# Patient Record
Sex: MALE | Race: WHITE | Employment: OTHER | URBAN - METROPOLITAN AREA
[De-identification: names, ages, dates, MRNs, and addresses within clinical notes are randomized per-mention and may not be internally consistent; named-entity substitution may affect disease eponyms.]

---

## 2017-02-14 RX ORDER — NEBIVOLOL HYDROCHLORIDE 10 MG/1
TABLET ORAL
Qty: 60 TAB | Refills: 6 | Status: SHIPPED | OUTPATIENT
Start: 2017-02-14 | End: 2017-06-15 | Stop reason: ALTCHOICE

## 2017-05-19 ENCOUNTER — OFFICE VISIT (OUTPATIENT)
Dept: ENDOCRINOLOGY | Age: 62
End: 2017-05-19

## 2017-05-19 VITALS
HEART RATE: 81 BPM | SYSTOLIC BLOOD PRESSURE: 133 MMHG | DIASTOLIC BLOOD PRESSURE: 81 MMHG | WEIGHT: 210.6 LBS | BODY MASS INDEX: 31.19 KG/M2 | HEIGHT: 69 IN

## 2017-05-19 DIAGNOSIS — I10 BENIGN ESSENTIAL HYPERTENSION: ICD-10-CM

## 2017-05-19 DIAGNOSIS — E78.2 MIXED HYPERLIPIDEMIA: ICD-10-CM

## 2017-05-19 DIAGNOSIS — Z79.4 TYPE 2 DIABETES MELLITUS WITH OTHER CIRCULATORY COMPLICATION, WITH LONG-TERM CURRENT USE OF INSULIN (HCC): Primary | ICD-10-CM

## 2017-05-19 DIAGNOSIS — E11.59 TYPE 2 DIABETES MELLITUS WITH OTHER CIRCULATORY COMPLICATION, WITH LONG-TERM CURRENT USE OF INSULIN (HCC): Primary | ICD-10-CM

## 2017-05-19 LAB — HBA1C MFR BLD HPLC: 7.7 %

## 2017-05-19 RX ORDER — CLOTRIMAZOLE AND BETAMETHASONE DIPROPIONATE 10; .5 MG/ML; MG/ML
LOTION TOPICAL
Qty: 30 ML | Refills: 1 | Status: SHIPPED | OUTPATIENT
Start: 2017-05-19 | End: 2018-11-28 | Stop reason: SDUPTHER

## 2017-05-19 RX ORDER — PETROLATUM,WHITE
OINTMENT IN PACKET (GRAM) TOPICAL
Qty: 2270 G | Refills: 3 | Status: SHIPPED | OUTPATIENT
Start: 2017-05-19 | End: 2020-11-06 | Stop reason: ALTCHOICE

## 2017-05-19 NOTE — MR AVS SNAPSHOT
Visit Information Date & Time Provider Department Dept. Phone Encounter #  
 5/19/2017 11:30 AM Allison Torres, [address] [place] [address] Diabetes and Endocrinology [phone] [id number] Follow-up Instructions Return in about 6 months (around 11/19/2017). Your Appointments 6/15/2017  2:30 PM  
ESTABLISHED PATIENT with Trina Toledo MD  
[facility]) Appt Note: 6mo f/u REM $0CP  
 [address] [facility]  
[phone] 2 [address] Upcoming Health Maintenance Date Due Hepatitis C Screening 1955 DTaP/Tdap/Td series (1 - Tdap) 7/23/1975 FOBT Q 1 YEAR AGE 50-75 7/23/2004 EYE EXAM RETINAL OR DILATED Q1 3/15/2011 ZOSTER VACCINE AGE 60> 7/23/2014 LIPID PANEL Q1 10/30/2015 MICROALBUMIN Q1 5/12/2017 HEMOGLOBIN A1C Q6M 5/18/2017 INFLUENZA AGE 9 TO ADULT 8/1/2017 FOOT EXAM Q1 11/18/2017 Allergies as of 5/19/2017  Review Complete On: 5/19/2017 By: Allison Torres MD  
 No Known Allergies Current Immunizations  Reviewed on 5/29/2012 Name Date Influenza Vaccine Split  Deferred (Patient Refused), 3/27/2012  5:26 PM  
 Pneumococcal Vaccine (Unspecified Type) 3/25/2012  4:13 PM  
  
 Not reviewed this visit You Were Diagnosed With   
  
 Codes Comments Type 2 diabetes mellitus with other circulatory complication, with long-term current use of insulin (HCC)    -  Primary ICD-10-CM: E11.59, Z79.4 ICD-9-CM: 250.70, V58.67 Benign essential hypertension     ICD-10-CM: I10 
ICD-9-CM: 401.1 Mixed hyperlipidemia     ICD-10-CM: E78.2 ICD-9-CM: 272.2 Vitals BP Pulse Height(growth percentile) Weight(growth percentile) BMI Smoking Status 133/81 81 5' 9\" (1.753 m) 210 lb 9.6 oz (95.5 kg) 31.1 kg/m2 Current Every Day Smoker BMI and BSA Data  Body Mass Index Body Surface Area  
 31.1 kg/m 2 2.16 m 2  
  
  
 Preferred Pharmacy Pharmacy Name Phone [facility] [address], [address] [facility] [address] Your Updated Medication List  
  
   
This list is accurate as of: 5/19/17 12:10 PM.  Always use your most recent med list. amLODIPine 10 mg tablet Commonly known as:  Mary Medici TAKE ONE TABLET BY MOUTH ONCE DAILY  
  
 aspirin 81 mg chewable tablet Take 81 mg by mouth daily. atorvastatin 40 mg tablet Commonly known as:  LIPITOR  
TAKE ONE TABLET BY MOUTH ONCE DAILY  
  
 BENADRYL ALLERGY 25 mg tablet Generic drug:  diphenhydrAMINE Take 25 mg by mouth every six (6) hours as needed for Sleep. chlorthalidone 25 mg tablet Commonly known as:  HYGROTEN  
TAKE ONE TABLET BY MOUTH ONCE DAILY  
  
 clotrimazole 1 % topical cream  
Commonly known as:  Jim Sauceda Apply  to affected area two (2) times a day. glipiZIDE 10 mg tablet Commonly known as:  GLUCOTROL  
1 tablet with breakfast and 1 1/2 tablet with dinner (new directions) glycerin-mineral oil-lanolin topical cream  
Commonly known as:  EUCERIN PLUS Apply twice daily as needed JANUMET 50-1,000 mg per tablet Generic drug:  SITagliptin-metFORMIN  
TAKE 1 TABLET BY MOUTH WITH BREAKFAST AND 1 TABLET BY MOUTH WITH DINNER. THIS REPLACES METFORMIN AND JANUVIA  
  
 KLOR-CON M20 20 mEq tablet Generic drug:  potassium chloride TAKE ONE TABLET BY MOUTH ONCE DAILY  
  
 LANTUS SOLOSTAR 100 unit/mL (3 mL) pen Generic drug:  insulin glargine INJECT 9 UNITS SUBCUTANEOUSLY ONCE DAILY  
  
 lisinopril 40 mg tablet Commonly known as:  PRINIVIL, ZESTRIL  
TAKE ONE TABLET BY MOUTH ONCE DAILY FOR BLOOD PRESSURE AND KIDNEY PROTECTION  
  
 * nebivolol 20 mg tablet Commonly known as:  BYSTOLIC Take 1 tablet by mouth daily. * BYSTOLIC 10 mg tablet Generic drug:  nebivolol TAKE TWO TABLETS BY MOUTH ONCE DAILY  
  
 nitroglycerin 0.4 mg SL tablet Commonly known as:  NITROSTAT  
by SubLINGual route every five (5) minutes as needed. PEPCID 20 mg tablet Generic drug:  famotidine Take 20 mg by mouth daily. sildenafil citrate 50 mg tablet Commonly known as:  VIAGRA Take 1 Tab by mouth as needed for Other (ED. ). Do not take nitroglycerine tablets. TYLENOL 325 mg tablet Generic drug:  acetaminophen Take  by mouth every four (4) hours as needed for Pain. VITAMIN D3 2,000 unit Tab Generic drug:  cholecalciferol (vitamin D3) Take  by mouth. * Notice: This list has 2 medication(s) that are the same as other medications prescribed for you. Read the directions carefully, and ask your doctor or other care provider to review them with you. We Performed the Following AMB POC HEMOGLOBIN A1C [23971 CPT(R)]  DIABETES FOOT EXAM [HM7 Custom] MICROALBUMIN, UR, RAND W/ MICROALBUMIN/CREA RATIO [id number] CPT(R)] MN COLLECTION VENOUS BLOOD,VENIPUNCTURE [id number] CPT(R)] MN HANDLG&/OR CONVEY OF SPEC FOR TR OFFICE TO LAB [44941 CPT(R)] Follow-up Instructions Return in about 6 months (around 11/19/2017). Patient Instructions 1) Continue the Lantus 10 units every day (insulin) 2) Continue the Glipizide one pill with breakfast and one and a half with dinner. 3) Continue the Janumet 50-1000mg with breakfast and with dinner. Introducing [facility] & HEALTH SERVICES! [facility] introduces [employer] patient portal. Now you can access parts of your medical record, email your doctor's office, and request medication refills online. 1. In your internet browser, go to [email or web address] 2. Click on the First Time User? Click Here link in the Sign In box. You will see the New Member Sign Up page. 3. Enter your [employer] Access Code exactly as it appears below. You will not need to use this code after youve completed the sign-up process.  If you do not sign up before the expiration date, you must request a new code. · [employer] Access Code: [email or web address] Expires: 8/17/2017 12:10 PM 
 
4. Enter the last four digits of your Social Security Number (xxxx) and Date of Birth (mm/dd/yyyy) as indicated and click Submit. You will be taken to the next sign-up page. 5. Create a SmartCloud ID. This will be your SmartCloud login ID and cannot be changed, so think of one that is secure and easy to remember. 6. Create a SmartCloud password. You can change your password at any time. 7. Enter your Password Reset Question and Answer. This can be used at a later time if you forget your password. 8. Enter your e-mail address. You will receive e-mail notification when new information is available in [address] 9. Click Sign Up. You can now view and download portions of your medical record. 10. Click the Download Summary menu link to download a portable copy of your medical information. If you have questions, please visit the Frequently Asked Questions section of the Smart[employer] website. Remember, [employer] is NOT to be used for urgent needs. For medical emergencies, dial 911. Now available from your iPhone and Android! Please provide this summary of care documentation to your next provider. If you have any questions after today's visit, please call [phone].

## 2017-05-19 NOTE — PROGRESS NOTES
Chief Complaint   Patient presents with    Diabetes     pcp and pharmacy verified. Eye exam over a year. Records since his last visit reviewed. History of Present Illness: Ricardo Funk is a 57 y.o. male here for follow up of diabetes. At his last visit in November 2016 his A1C was 9.0% on Janumet 50/1000mg BID, glipizide 10mg BID and Lantus 9 units daily. I instructed him to increase his Lantus to 11 units and his Glipizide to 10mg with breakfast and 15mg with dinner and continue his Janumet 50/1000mg BID. Pt notes he had some low BGs so he went back to the Lantus 9 units daily. He saw Dr. Silvia Fuller in December 2016 and everything was looking stable. Today is the last day of an Antibiotic regimen he was taking for a tooth infection. His A1C today was 7.7%    Pt is taking Janumet 50/1000mg BID, Lantus 10 units daily and Glipizide 10mg with breakfast and 15mg with dinner. He notes that with the Lantus 11 units he had low BGs, but with the 10 units he has not had any low BGs. He denies issues of CP, SOB, polyuria and polydipsia. Pt is eating 3 meals daily. His largest meal is Dinner. For breakfast he will have a poptart or Sauceda and egg sandwich. He will have lunch around 1PM, he typically has a turkey sandwich and coffee   Last night for dinner he had sausage gravy and eggs and peas and water. He is followed by Dr. Silvia Fuller of cardiology. He stays busy chopping wood and yard work and work around [employer]. 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 No history of retinopathy, neuropathy, but has some nephropathy (Urine MA/Cr was 70 in May 2016). Last eye exam was May 2015, he has an appointment in the near future. Current Outpatient Prescriptions   Medication Sig    white petrolatum (WHITE PETROLEUM JELLY) topical onitment Apply to the affected area every morning. To uses once the rash is gone.     clotrimazole-betamethasone (LOTRISONE) 1-0.05 % lotion Apply to the effected area every morning    lisinopril (PRINIVIL, ZESTRIL) 40 mg tablet TAKE ONE TABLET BY MOUTH ONCE DAILY FOR BLOOD PRESSURE AND KIDNEY PROTECTION    LANTUS SOLOSTAR 100 unit/mL (3 mL) pen INJECT 9 UNITS SUBCUTANEOUSLY ONCE DAILY (Patient taking differently: INJECT 10 UNITS SUBCUTANEOUSLY ONCE DAILY)    BYSTOLIC 10 mg tablet TAKE TWO TABLETS BY MOUTH ONCE DAILY    chlorthalidone (HYGROTEN) 25 mg tablet TAKE ONE TABLET BY MOUTH ONCE DAILY    KLOR-CON M20 20 mEq tablet TAKE ONE TABLET BY MOUTH ONCE DAILY    amLODIPine (NORVASC) 10 mg tablet TAKE ONE TABLET BY MOUTH ONCE DAILY    JANUMET 50-1,000 mg per tablet TAKE 1 TABLET BY MOUTH WITH BREAKFAST AND 1 TABLET BY MOUTH WITH DINNER. THIS REPLACES METFORMIN AND JANUVIA    atorvastatin (LIPITOR) 40 mg tablet TAKE ONE TABLET BY MOUTH ONCE DAILY    glipiZIDE (GLUCOTROL) 10 mg tablet 1 tablet with breakfast and 1 1/2 tablet with dinner (new directions)    glycerin-mineral oil-lanolin (EUCERIN PLUS) topical cream Apply twice daily as needed    cholecalciferol, vitamin D3, (VITAMIN D3) 2,000 unit tab Take  by mouth.  nebivolol (BYSTOLIC) 20 mg tablet Take 1 tablet by mouth daily.  acetaminophen (TYLENOL) 325 mg tablet Take  by mouth every four (4) hours as needed for Pain.  diphenhydrAMINE (BENADRYL ALLERGY) 25 mg tablet Take 25 mg by mouth every six (6) hours as needed for Sleep.  famotidine (PEPCID) 20 mg tablet Take 20 mg by mouth daily.  nitroglycerin (NITROSTAT) 0.4 mg SL tablet by SubLINGual route every five (5) minutes as needed.  aspirin 81 mg chewable tablet Take 81 mg by mouth daily.  sildenafil citrate (VIAGRA) 50 mg tablet Take 1 Tab by mouth as needed for Other (ED. ). Do not take nitroglycerine tablets. No current facility-administered medications for this visit.       No Known Allergies  Review of Systems:  - Eyes: no blurry vision or double vision  - Cardiovascular: no chest pain  - Respiratory: no shortness of breath  - Musculoskeletal: no myalgias  - Neurological: no numbness/tingling in extremities    Physical Examination:  Blood pressure 133/81, pulse 81, height 5' 9\" (1.753 m), weight 210 lb 9.6 oz (95.5 kg). - General: pleasant, no distress, good eye contact   - Neck: no carotid bruits  - Cardiovascular: regular, normal rate, nl s1 and s2, no m/r/g, 2+ DP pulses   - Respiratory: clear bilaterally  - Integumentary: no edema, no foot ulcers, sensation to monofilament and vibration intact bilaterally  - Psychiatric: normal mood and affect    Data Reviewed:   His A1C today was 7.7%    Assessment/Plan:   1) DM > His A1C today was 7.7%. Pt's BGs is doing well on Lantus 10 units daily, Glipizide 10mg with breakfast and 15mg with dinner and Metformin/Januvia 50/1000mg BID. Will check a urine MA today. His Goal A1C is under 8%. 2) HTN > His BP is at goal and is on an ACEI for renal protection    3) HLD > Pt is tolerating Lipitor 40mg daily, which is a high intensity statin regimen. Pt voices understanding and agreement with the plan. RTC 6 months    Follow-up Disposition:  Return in about 6 months (around 11/19/2017).     Copy sent to:  Dr. Mazin Monroe

## 2017-05-19 NOTE — PATIENT INSTRUCTIONS
1) Continue the Lantus 10 units every day (insulin)  2) Continue the Glipizide one pill with breakfast and one and a half with dinner. 3) Continue the Janumet 50-1000mg with breakfast and with dinner.

## 2017-05-20 LAB
ALBUMIN/CREAT UR: 137.4 MG/G CREAT (ref 0–30)
CREAT UR-MCNC: 109.9 MG/DL
MICROALBUMIN UR-MCNC: 151 UG/ML

## 2017-06-15 ENCOUNTER — OFFICE VISIT (OUTPATIENT)
Dept: CARDIOLOGY CLINIC | Age: 62
End: 2017-06-15

## 2017-06-15 VITALS
DIASTOLIC BLOOD PRESSURE: 78 MMHG | WEIGHT: 213 LBS | SYSTOLIC BLOOD PRESSURE: 132 MMHG | BODY MASS INDEX: 31.55 KG/M2 | HEIGHT: 69 IN | HEART RATE: 74 BPM | OXYGEN SATURATION: 96 % | RESPIRATION RATE: 20 BRPM

## 2017-06-15 DIAGNOSIS — Z95.1 S/P CABG X 4: ICD-10-CM

## 2017-06-15 DIAGNOSIS — E78.2 MIXED HYPERLIPIDEMIA: ICD-10-CM

## 2017-06-15 DIAGNOSIS — Z95.5 S/P CORONARY ARTERY STENT PLACEMENT: ICD-10-CM

## 2017-06-15 DIAGNOSIS — I10 BENIGN ESSENTIAL HYPERTENSION: ICD-10-CM

## 2017-06-15 DIAGNOSIS — Z72.0 TOBACCO ABUSE DISORDER: ICD-10-CM

## 2017-06-15 DIAGNOSIS — I25.10 CORONARY ARTERY DISEASE INVOLVING NATIVE CORONARY ARTERY OF NATIVE HEART WITHOUT ANGINA PECTORIS: Primary | ICD-10-CM

## 2017-06-15 NOTE — MR AVS SNAPSHOT
Visit Information Date & Time Provider Department Dept. Phone Encounter #  
 6/15/2017  2:30 PM Destiny Saba, [address] [facility] [phone] [id number] Your Appointments 11/21/2017 11:30 AM  
Follow Up with MD Jessica Llamas [facility] Diabetes and Endocrinology [facility]) Appt Note: 6 month f/u    Diabetes [address] [phone] [address] [place] [address] Upcoming Health Maintenance Date Due Hepatitis C Screening 1955 DTaP/Tdap/Td series (1 - Tdap) 7/23/1975 FOBT Q 1 YEAR AGE 50-75 7/23/2004 EYE EXAM RETINAL OR DILATED Q1 3/15/2011 ZOSTER VACCINE AGE 60> 7/23/2014 LIPID PANEL Q1 10/30/2015 INFLUENZA AGE 9 TO ADULT 8/1/2017 HEMOGLOBIN A1C Q6M 11/19/2017 FOOT EXAM Q1 5/19/2018 MICROALBUMIN Q1 5/19/2018 Allergies as of 6/15/2017  Review Complete On: 6/15/2017 By: Lars Mendoza NP No Known Allergies Current Immunizations  Reviewed on 5/29/2012 Name Date Influenza Vaccine Split  Deferred (Patient Refused), 3/27/2012  5:26 PM  
 Pneumococcal Vaccine (Unspecified Type) 3/25/2012  4:13 PM  
  
 Not reviewed this visit You Were Diagnosed With   
  
 Codes Comments Coronary artery disease involving native coronary artery of native heart without angina pectoris    -  Primary ICD-10-CM: I25.10 ICD-9-CM: 414.01 Benign essential hypertension     ICD-10-CM: I10 
ICD-9-CM: 401.1 S/P CABG x 4     ICD-10-CM: Z95.1 ICD-9-CM: V45.81 S/P coronary artery stent placement     ICD-10-CM: Z95.5 ICD-9-CM: V45.82 Tobacco abuse disorder     ICD-10-CM: Z72.0 ICD-9-CM: 305.1 Mixed hyperlipidemia     ICD-10-CM: E78.2 ICD-9-CM: 272.2 Vitals BP Pulse Resp Height(growth percentile) Weight(growth percentile) SpO2 132/78 (BP 1 Location: Right arm, BP Patient Position: Sitting) 74 20 5' 9\" (1.753 m) 213 lb (96.6 kg) 96% BMI Smoking Status 31.45 kg/m2 Current Every Day Smoker Vitals History BMI and BSA Data Body Mass Index Body Surface Area  
 31.45 kg/m 2 2.17 m 2 Preferred Pharmacy Pharmacy Name [facility] [address] [place] [place] [facility] Your Updated Medication List  
  
   
This list is accurate as of: 6/15/17  3:28 PM.  Always use your most recent med list. amLODIPine 10 mg tablet Commonly known as:  Janet Arnt TAKE ONE TABLET BY MOUTH ONCE DAILY  
  
 aspirin 81 mg chewable tablet Take 81 mg by mouth daily. atorvastatin 40 mg tablet Commonly known as:  LIPITOR  
TAKE ONE TABLET BY MOUTH ONCE DAILY  
  
 BENADRYL ALLERGY 25 mg tablet Generic drug:  diphenhydrAMINE Take 25 mg by mouth every six (6) hours as needed for Sleep. chlorthalidone 25 mg tablet Commonly known as:  HYGROTEN  
TAKE ONE TABLET BY MOUTH ONCE DAILY  
  
 clotrimazole-betamethasone 1-0.05 % lotion Commonly known as:  Nova Stephan Apply to the effected area every morning  
  
 glipiZIDE 10 mg tablet Commonly known as:  GLUCOTROL  
TAKE 1 TABLET BY MOUTH WITH BREAKFAST AND TAKE 1&1/2 TABLETS BY MOUTH WITH DINNER  
  
 glycerin-mineral oil-lanolin topical cream  
Commonly known as:  EUCERIN PLUS Apply twice daily as needed JANUMET 50-1,000 mg per tablet Generic drug:  SITagliptin-metFORMIN  
TAKE 1 TABLET BY MOUTH WITH BREAKFAST AND 1 TABLET BY MOUTH WITH DINNER. THIS REPLACES METFORMIN AND JANUVIA  
  
 KLOR-CON M20 20 mEq tablet Generic drug:  potassium chloride TAKE ONE TABLET BY MOUTH ONCE DAILY  
  
 LANTUS SOLOSTAR 100 unit/mL (3 mL) Inpn Generic drug:  insulin glargine INJECT 9 UNITS SUBCUTANEOUSLY ONCE DAILY  
  
 lisinopril 40 mg tablet Commonly known as:  Yuri Bach  
 TAKE ONE TABLET BY MOUTH ONCE DAILY FOR BLOOD PRESSURE AND KIDNEY PROTECTION  
  
 nebivolol 20 mg tablet Commonly known as:  BYSTOLIC Take 1 tablet by mouth daily. nitroglycerin 0.4 mg SL tablet Commonly known as:  NITROSTAT  
by SubLINGual route every five (5) minutes as needed. PEPCID 20 mg tablet Generic drug:  famotidine Take 20 mg by mouth daily. sildenafil citrate 50 mg tablet Commonly known as:  VIAGRA Take 1 Tab by mouth as needed for Other (ED. ). Do not take nitroglycerine tablets. TYLENOL 325 mg tablet Generic drug:  acetaminophen Take  by mouth every four (4) hours as needed for Pain. VITAMIN D3 2,000 unit Tab Generic drug:  cholecalciferol (vitamin D3) Take  by mouth.  
  
 white petrolatum topical onitment Commonly known as:  [address] Apply to the affected area every morning. To uses once the rash is gone. We Performed the Following AMB POC EKG ROUTINE W/ 12 LEADS, INTER & REP [06833 CPT(R)] CK [id number] CPT(R)] HEMOGLOBIN A1C W/O EAG [93893 CPT(R)] LIPID PANEL [56795 CPT(R)] METABOLIC PANEL, COMPREHENSIVE [28139 CPT(R)] Introducing \[facility]\"" & HEALTH SERVICES! Isabel Eastman introduces [employer] patient portal. Now you can access parts of your medical record, email your doctor's office, and request medication refills online. 1. In your internet browser, go to [email or web address] 2. Click on the First Time User? Click Here link in the Sign In box. You will see the New Member Sign Up page. 3. Enter your [employer] Access Code exactly as it appears below. You will not need to use this code after youve completed the sign-up process. If you do not sign up before the expiration date, you must request a new code. · [employer] Access Code: [email or web address] Expires: 8/17/2017 12:10 PM 
 
4.  Enter the last four digits of your Social Security Number (xxxx) and Date of Birth (mm/dd/yyyy) as indicated and click Submit. You will be taken to the next sign-up page. 5. Create a [employer] ID. This will be your [employer] login ID and cannot be changed, so think of one that is secure and easy to remember. 6. Create a [employer] password. You can change your password at any time. 7. Enter your Password Reset Question and Answer. This can be used at a later time if you forget your password. 8. Enter your e-mail address. You will receive e-mail notification when new information is available in [address] 9. Click Sign Up. You can now view and download portions of your medical record. 10. Click the Download Summary menu link to download a portable copy of your medical information. If you have questions, please visit the Frequently Asked Questions section of the [employer] website. Remember, [employer] is NOT to be used for urgent needs. For medical emergencies, dial 911. Now available from your iPhone and Android! Please provide this summary of care documentation to your next provider. If you have any questions after today's visit, please call [phone].

## 2017-06-15 NOTE — PROGRESS NOTES
Joaquín Orellana DNP, ANP-BC  Subjective/HPI:     Rody Peralta is a 57 y.o. male is here for routine f/u. The patient denies chest pain/ shortness of breath, orthopnea, PND, LE edema, palpitations, syncope, presyncope or fatigue. Patient reports he is in his usual state of health, is getting over a upper respiratory tract infection with persistent cough triggering left lateral chest discomfort which has nearly resolved. His discomfort was brought on by coughing sneezing or taking a deep breath. He is able to perform his exertional activities walking and heavy lifting without any symptoms. Continues to take all medications as directed, discussed that he was overdue with blood work for lipids. PCP Provider  No primary care provider on file. Past Medical History:   Diagnosis Date    CAD (coronary artery disease)     COPD     CVA (cerebral infarction) 3/24/2012    Diabetes ([facility] [address] [address]     Hypertension     Other ill-defined conditions     high cholesterol    Stroke ([facility] [address] 3/2012    from previous medical record      Past Surgical History:   Procedure Laterality Date    CARDIAC SURG PROCEDURE UNLIST      cabg, stents    COMB R&L HEART CATH  3/24/2012         HX OTHER SURGICAL      cyst removed from right chest    HX UROLOGICAL      kidney stone removed     No Known Allergies   Family History   Problem Relation Age of Onset    Coronary Artery Disease Father     Heart Disease Mother     Coronary Artery Disease Other      Mother and brother in their 52's      Current Outpatient Prescriptions   Medication Sig    glipiZIDE (GLUCOTROL) 10 mg tablet TAKE 1 TABLET BY MOUTH WITH BREAKFAST AND TAKE 1&1/2 TABLETS BY MOUTH WITH DINNER    white petrolatum (WHITE PETROLEUM JELLY) topical onitment Apply to the affected area every morning. To uses once the rash is gone.     clotrimazole-betamethasone (LOTRISONE) 1-0.05 % lotion Apply to the effected area every morning    lisinopril (PRINIVIL, ZESTRIL) 40 mg tablet TAKE ONE TABLET BY MOUTH ONCE DAILY FOR BLOOD PRESSURE AND KIDNEY PROTECTION    LANTUS SOLOSTAR 100 unit/mL (3 mL) pen INJECT 9 UNITS SUBCUTANEOUSLY ONCE DAILY (Patient taking differently: INJECT 10 UNITS SUBCUTANEOUSLY ONCE DAILY)    chlorthalidone (HYGROTEN) 25 mg tablet TAKE ONE TABLET BY MOUTH ONCE DAILY    KLOR-CON M20 20 mEq tablet TAKE ONE TABLET BY MOUTH ONCE DAILY    amLODIPine (NORVASC) 10 mg tablet TAKE ONE TABLET BY MOUTH ONCE DAILY    JANUMET 50-1,000 mg per tablet TAKE 1 TABLET BY MOUTH WITH BREAKFAST AND 1 TABLET BY MOUTH WITH DINNER. THIS REPLACES METFORMIN AND JANUVIA    atorvastatin (LIPITOR) 40 mg tablet TAKE ONE TABLET BY MOUTH ONCE DAILY    glycerin-mineral oil-lanolin (EUCERIN PLUS) topical cream Apply twice daily as needed    cholecalciferol, vitamin D3, (VITAMIN D3) 2,000 unit tab Take  by mouth.  nebivolol (BYSTOLIC) 20 mg tablet Take 1 tablet by mouth daily.  acetaminophen (TYLENOL) 325 mg tablet Take  by mouth every four (4) hours as needed for Pain.  diphenhydrAMINE (BENADRYL ALLERGY) 25 mg tablet Take 25 mg by mouth every six (6) hours as needed for Sleep.  sildenafil citrate (VIAGRA) 50 mg tablet Take 1 Tab by mouth as needed for Other (ED. ). Do not take nitroglycerine tablets.  famotidine (PEPCID) 20 mg tablet Take 20 mg by mouth daily.  nitroglycerin (NITROSTAT) 0.4 mg SL tablet by SubLINGual route every five (5) minutes as needed.  aspirin 81 mg chewable tablet Take 81 mg by mouth daily. No current facility-administered medications for this visit. Vitals:    06/15/17 1430 06/15/17 1441   BP: 130/80 132/78   Pulse: 74    Resp: 20    SpO2: 96%    Weight: 213 lb (96.6 kg)    Height: 5' 9\" (1.753 m)      Social History     Social History    Marital status:      Spouse name: N/A    Number of children: N/A    Years of education: N/A     Occupational History    Not on file.      Social History Main Topics    Smoking status: Current Every Day Smoker     Packs/day: 1.00     Years: 40.00     Types: Cigarettes    Smokeless tobacco: Never Used    Alcohol use Yes      Comment: occasionally    Drug use: No    Sexual activity: Not on file     Other Topics Concern    Not on file     Social History Narrative       I have reviewed the nurses notes, vitals, problem list, allergy list, medical history, family, social history and medications. Review of Symptoms:    General: Pt denies excessive weight gain or loss. Pt is able to conduct ADL's  HEENT: Denies blurred vision, headaches, epistaxis and difficulty swallowing. Respiratory: Denies shortness of breath, PERAZA, wheezing or stridor. Cardiovascular: Denies exertional chest pain,   Gastrointestinal: Denies poor appetite, indigestion, abdominal pain or blood in stool  Musculoskeletal: Denies pain or swelling from muscles or joints  Neurologic: Denies tremor, paresthesias, or sensory motor disturbance  Skin: Denies rash, itching or texture change. Physical Exam:      General: Well developed, in no acute distress, cooperative and alert  HEENT: No carotid bruits, no JVD, trach is midline. Neck Supple, PEERL, EOM intact. Heart:  Normal S1/S2 negative S3 or S4. Regular, no murmur, gallop or rub.   Respiratory: Clear bilaterally x 4, no wheezing or rales  Abdomen:   Soft, non-tender, no masses, bowel sounds are active.   Extremities:  No edema, normal cap refill, no cyanosis, atraumatic. Neuro: A&Ox3, speech clear, gait stable. Skin: Skin color is normal. No rashes or lesions.  Non diaphoretic  Vascular: 2+ pulses symmetric in all extremities    Cardiographics    ECG: Sinus rhythm  Results for orders placed or performed during the hospital encounter of 09/20/12   EKG, 12 LEAD, INITIAL   Result Value Ref Range    Ventricular Rate 93 BPM    Atrial Rate 93 BPM    P-R Interval 176 ms    QRS Duration 88 ms    Q-T Interval 338 ms    QTC Calculation (Bezet) 420 ms    Calculated P Axis 6 degrees Calculated R Axis 13 degrees    Calculated T Axis 125 degrees    Diagnosis       Normal sinus rhythm  ST & T wave abnormality, consider lateral ischemia  When compared with ECG of 20-SEP-2012 12:54,  Nonspecific T wave abnormality has replaced inverted T waves in Anterior   leads  Confirmed by MORRIS Skelton ([id number]) on 9/21/2012 8:29:51 AM         Cardiology Labs:  Lab Results   Component Value Date/Time    Cholesterol, total 139 06/26/2014 12:00 AM    HDL Cholesterol 27 06/26/2014 12:00 AM    LDL, calculated 75 06/26/2014 12:00 AM    Triglyceride 185 06/26/2014 12:00 AM    CHOL/HDL Ratio 5.2 09/21/2012 04:35 AM       Lab Results   Component Value Date/Time    Sodium 140 02/11/2016 11:33 AM    Potassium 4.0 02/11/2016 11:33 AM    Chloride 98 02/11/2016 11:33 AM    CO2 25 02/11/2016 11:33 AM    Anion gap 7 09/21/2012 04:35 AM    Glucose 164 02/11/2016 11:33 AM    BUN 15 02/11/2016 11:33 AM    Creatinine 0.98 02/11/2016 11:33 AM    BUN/Creatinine ratio 15 02/11/2016 11:33 AM    GFR est AA 96 02/11/2016 11:33 AM    GFR est non-AA 83 02/11/2016 11:33 AM    Calcium 9.6 02/11/2016 11:33 AM    Bilirubin, total 0.7 02/11/2016 11:33 AM    AST (SGOT) 23 02/11/2016 11:33 AM    Alk. phosphatase 64 02/11/2016 11:33 AM    Protein, total 7.0 02/11/2016 11:33 AM    Albumin 4.5 02/11/2016 11:33 AM    Globulin 2.4 05/23/2012 04:05 AM    A-G Ratio 1.8 02/11/2016 11:33 AM    ALT (SGPT) 30 02/11/2016 11:33 AM           Assessment:     Assessment:     Bindu Hercules was seen today for coronary artery disease.     Diagnoses and all orders for this visit:    Coronary artery disease involving native coronary artery of native heart without angina pectoris  -     AMB POC EKG ROUTINE W/ 12 LEADS, INTER & REP  -     LIPID PANEL  -     METABOLIC PANEL, COMPREHENSIVE  -     CK  -     HEMOGLOBIN A1C W/O EAG    Benign essential hypertension  -     LIPID PANEL  -     METABOLIC PANEL, COMPREHENSIVE  -     CK  -     HEMOGLOBIN A1C W/O EAG    S/P CABG x 4  - LIPID PANEL  -     METABOLIC PANEL, COMPREHENSIVE  -     CK  -     HEMOGLOBIN A1C W/O EAG    S/P coronary artery stent placement  -     LIPID PANEL  -     METABOLIC PANEL, COMPREHENSIVE  -     CK  -     HEMOGLOBIN A1C W/O EAG    Tobacco abuse disorder  -     LIPID PANEL  -     METABOLIC PANEL, COMPREHENSIVE  -     CK  -     HEMOGLOBIN A1C W/O EAG    Mixed hyperlipidemia  -     LIPID PANEL  -     METABOLIC PANEL, COMPREHENSIVE  -     CK  -     HEMOGLOBIN A1C W/O EAG        ICD-10-CM ICD-9-CM    1. Coronary artery disease involving native coronary artery of native heart without angina pectoris I25.10 414.01 AMB POC EKG ROUTINE W/ 12 LEADS, INTER & REP      LIPID PANEL      METABOLIC PANEL, COMPREHENSIVE      CK      HEMOGLOBIN A1C W/O EAG   2. Benign essential hypertension I10 401.1 LIPID PANEL      METABOLIC PANEL, COMPREHENSIVE      CK      HEMOGLOBIN A1C W/O EAG   3. S/P CABG x 4 Z95.1 V45.81 LIPID PANEL      METABOLIC PANEL, COMPREHENSIVE      CK      HEMOGLOBIN A1C W/O EAG   4. S/P coronary artery stent placement Z95.5 V45.82 LIPID PANEL      METABOLIC PANEL, COMPREHENSIVE      CK      HEMOGLOBIN A1C W/O EAG   5. Tobacco abuse disorder Z72.0 305.1 LIPID PANEL      METABOLIC PANEL, COMPREHENSIVE      CK      HEMOGLOBIN A1C W/O EAG   6. Mixed hyperlipidemia E78.2 272.2 LIPID PANEL      METABOLIC PANEL, COMPREHENSIVE      CK      HEMOGLOBIN A1C W/O EAG     Orders Placed This Encounter    LIPID PANEL    METABOLIC PANEL, COMPREHENSIVE    CK    HEMOGLOBIN A1C W/O EAG    AMB POC EKG ROUTINE W/ 12 LEADS, INTER & REP     Order Specific Question:   Reason for Exam:     Answer:   routine        Plan:     1. Atherosclerotic heart disease: Clinically stable continue current therapy long-term aspirin. 2.  Hypertension controlled  3.   Hyperlipidemia: Due for lipids, another lab slip was generated for the patient with him stating he will return in 2 weeks to have the labs drawn off will have drawn near his residence in [facility] [address]. 4.  Tobacco abuse: Continues to smoke, declines any intervention for cessation. Follow-up in 6 months      Sae Go NP      [facility]    6/16/2017         Agree with note as outlined by  NP. I confirm findings in history and physical exam. No additional findings noted. Agree with plan as outlined above.      Za Magana MD

## 2017-07-14 ENCOUNTER — HOSPITAL ENCOUNTER (OUTPATIENT)
Dept: LAB | Age: 62
Discharge: HOME | End: 2017-07-14
Payer: MEDICARE

## 2017-07-14 PROCEDURE — 80061 LIPID PANEL: CPT

## 2017-07-14 PROCEDURE — 36415 COLL VENOUS BLD VENIPUNCTURE: CPT

## 2017-07-14 PROCEDURE — 82043 UR ALBUMIN QUANTITATIVE: CPT

## 2017-07-14 PROCEDURE — 83036 HEMOGLOBIN GLYCOSYLATED A1C: CPT

## 2017-07-15 LAB
ALBUMIN SERPL-MCNC: 4.4 G/DL (ref 3.6–4.8)
ALBUMIN/GLOB SERPL: 1.6 {RATIO} (ref 1.2–2.2)
ALP SERPL-CCNC: 73 IU/L (ref 39–117)
ALT SERPL-CCNC: 41 IU/L (ref 0–44)
AST SERPL-CCNC: 25 IU/L (ref 0–40)
BILIRUB SERPL-MCNC: 0.6 MG/DL (ref 0–1.2)
BUN SERPL-MCNC: 11 MG/DL (ref 8–27)
BUN/CREAT SERPL: 13 (ref 10–24)
CALCIUM SERPL-MCNC: 9.9 MG/DL (ref 8.6–10.2)
CHLORIDE SERPL-SCNC: 98 MMOL/L (ref 96–106)
CK SERPL-CCNC: 158 U/L (ref 24–204)
CO2 SERPL-SCNC: 25 MMOL/L (ref 18–29)
CREAT SERPL-MCNC: 0.88 MG/DL (ref 0.76–1.27)
GLOBULIN SER CALC-MCNC: 2.8 G/DL (ref 1.5–4.5)
GLUCOSE SERPL-MCNC: 190 MG/DL (ref 65–99)
POTASSIUM SERPL-SCNC: 4.6 MMOL/L (ref 3.5–5.2)
PROT SERPL-MCNC: 7.2 G/DL (ref 6–8.5)
SODIUM SERPL-SCNC: 140 MMOL/L (ref 134–144)

## 2017-07-19 LAB
CHOLEST SERPL-MCNC: 132 MG/DL (ref 100–199)
HBA1C MFR BLD: 8.2 % (ref 4.8–5.6)
HCV AB SERPL QL IA: NON REACTIVE
HDLC SERPL-MCNC: 27 MG/DL
INTERPRETATION, 910389: NORMAL
LDLC SERPL CALC-MCNC: 66 MG/DL (ref 0–99)
Lab: NORMAL
MICROALBUMIN UR-MCNC: 179.6 UG/ML
TRIGL SERPL-MCNC: 193 MG/DL (ref 0–149)
VLDLC SERPL CALC-MCNC: 39 MG/DL (ref 5–40)

## 2017-07-20 ENCOUNTER — TELEPHONE (OUTPATIENT)
Dept: CARDIOLOGY CLINIC | Age: 62
End: 2017-07-20

## 2017-07-20 NOTE — TELEPHONE ENCOUNTER
----- Message from Florette Fleischer, ANP sent at 7/20/2017  8:47 AM EDT -----  Liver, CK, and electrolytes are fine. LDL is good. His HDL remains low, trigs high. Recommend he increase daily exercise. He expressed no interest in smoking cessation. Diabetes values -- I defer to Dr Ami Reyes. Thanks.

## 2017-07-20 NOTE — PROGRESS NOTES
Liver, CK, and electrolytes are fine. LDL is good. His HDL remains low, trigs high. Recommend he increase daily exercise. He expressed no interest in smoking cessation. Diabetes values -- I defer to Dr Kae Carter. Thanks.

## 2017-09-06 RX ORDER — CHLORTHALIDONE 25 MG/1
TABLET ORAL
Qty: 30 TAB | Refills: 6 | Status: SHIPPED | OUTPATIENT
Start: 2017-09-06 | End: 2018-04-05 | Stop reason: SDUPTHER

## 2017-09-20 RX ORDER — NEBIVOLOL HYDROCHLORIDE 10 MG/1
TABLET ORAL
Qty: 60 TAB | Refills: 6 | Status: SHIPPED | OUTPATIENT
Start: 2017-09-20 | End: 2018-04-25 | Stop reason: SDUPTHER

## 2017-11-21 ENCOUNTER — OFFICE VISIT (OUTPATIENT)
Dept: ENDOCRINOLOGY | Age: 62
End: 2017-11-21

## 2017-11-21 ENCOUNTER — OFFICE VISIT (OUTPATIENT)
Dept: CARDIOLOGY CLINIC | Age: 62
End: 2017-11-21

## 2017-11-21 VITALS
HEIGHT: 69 IN | BODY MASS INDEX: 31.22 KG/M2 | WEIGHT: 210.8 LBS | HEART RATE: 72 BPM | DIASTOLIC BLOOD PRESSURE: 72 MMHG | SYSTOLIC BLOOD PRESSURE: 111 MMHG

## 2017-11-21 VITALS
HEIGHT: 69 IN | WEIGHT: 212.4 LBS | OXYGEN SATURATION: 95 % | RESPIRATION RATE: 20 BRPM | DIASTOLIC BLOOD PRESSURE: 64 MMHG | HEART RATE: 62 BPM | SYSTOLIC BLOOD PRESSURE: 130 MMHG | BODY MASS INDEX: 31.46 KG/M2

## 2017-11-21 DIAGNOSIS — E11.8 TYPE 2 DIABETES MELLITUS WITH COMPLICATION, WITH LONG-TERM CURRENT USE OF INSULIN (HCC): Primary | ICD-10-CM

## 2017-11-21 DIAGNOSIS — E78.2 MIXED HYPERLIPIDEMIA: ICD-10-CM

## 2017-11-21 DIAGNOSIS — I10 BENIGN ESSENTIAL HYPERTENSION: ICD-10-CM

## 2017-11-21 DIAGNOSIS — E11.21 TYPE 2 DIABETES MELLITUS WITH DIABETIC NEPHROPATHY, WITH LONG-TERM CURRENT USE OF INSULIN (HCC): ICD-10-CM

## 2017-11-21 DIAGNOSIS — Z79.4 TYPE 2 DIABETES MELLITUS WITH COMPLICATION, WITH LONG-TERM CURRENT USE OF INSULIN (HCC): Primary | ICD-10-CM

## 2017-11-21 DIAGNOSIS — I25.10 CORONARY ARTERY DISEASE INVOLVING NATIVE CORONARY ARTERY OF NATIVE HEART WITHOUT ANGINA PECTORIS: Primary | ICD-10-CM

## 2017-11-21 DIAGNOSIS — Z79.4 TYPE 2 DIABETES MELLITUS WITH DIABETIC NEPHROPATHY, WITH LONG-TERM CURRENT USE OF INSULIN (HCC): ICD-10-CM

## 2017-11-21 DIAGNOSIS — I10 ESSENTIAL HYPERTENSION: ICD-10-CM

## 2017-11-21 LAB — HBA1C MFR BLD HPLC: 8.6 %

## 2017-11-21 RX ORDER — GLIPIZIDE 10 MG/1
TABLET ORAL
Qty: 90 TAB | Refills: 5 | Status: SHIPPED | OUTPATIENT
Start: 2017-11-21 | End: 2018-06-07 | Stop reason: SDUPTHER

## 2017-11-21 RX ORDER — OMEPRAZOLE 20 MG/1
20 CAPSULE, DELAYED RELEASE ORAL DAILY
COMMUNITY
End: 2018-06-07

## 2017-11-21 RX ORDER — INSULIN GLARGINE 100 [IU]/ML
INJECTION, SOLUTION SUBCUTANEOUS
Qty: 15 ML | Refills: 6
Start: 2017-11-21 | End: 2018-06-07 | Stop reason: SDUPTHER

## 2017-11-21 NOTE — MR AVS SNAPSHOT
Visit Information Date & Time Provider Department Dept. Phone Encounter #  
 11/21/2017 11:30 AM Yazan Lopez, [address] [facility] End[facility]ology [phone] [id number] Follow-up Instructions Return in about 6 months (around 5/21/2018). Your Appointments 6/7/2018  1:00 PM  
6 MONTH with Dodie Foote MD  
[facility] [address] [place] [address]) Appt Note: Dr. Begum [address]  
[phone] [facility] Upcoming Health Maintenance Date Due DTaP/Tdap/Td series (1 - Tdap) 7/23/1975 FOBT Q 1 YEAR AGE 50-75 7/23/2004 EYE EXAM RETINAL OR DILATED Q1 3/15/2011 ZOSTER VACCINE AGE 60> 5/23/2014 Influenza Age 5 to Adult 8/1/2017 HEMOGLOBIN A1C Q6M 1/14/2018 FOOT EXAM Q1 5/19/2018 MICROALBUMIN Q1 7/14/2018 LIPID PANEL Q1 7/14/2018 Allergies as of 11/21/2017  Review Complete On: 11/21/2017 By: Yazan Lopez MD  
 No Known Allergies Current Immunizations  Reviewed on 5/29/2012 Name Date Influenza Vaccine Split  Deferred (Patient Refused), 3/27/2012  5:26 PM  
 ZZZ-RETIRED (DO NOT USE) Pneumococcal Vaccine (Unspecified Type) 3/25/2012  4:13 PM  
  
 Not reviewed this visit You Were Diagnosed With   
  
 Codes Comments Type 2 diabetes mellitus with complication, with long-term current use of insulin (HCC)    -  Primary ICD-10-CM: E11.8, Z79.4 ICD-9-CM: 250.90, V58.67 Benign essential hypertension     ICD-10-CM: I10 
ICD-9-CM: 401.1 Mixed hyperlipidemia     ICD-10-CM: E78.2 ICD-9-CM: 272.2 Vitals BP Pulse Height(growth percentile) Weight(growth percentile) BMI Smoking Status 111/72 72 5' 9\" (1.753 m) 210 lb 12.8 oz (95.6 kg) 31.13 kg/m2 Current Every Day Smoker BMI and BSA Data Body Mass Index Body Surface Area  
 31.13 kg/m 2 2.16 m 2 Preferred Pharmacy Pharmacy Name Phone [facility] [address] [place], [place] - [phone] Your Updated Medication List  
  
   
This list is accurate as of: 11/21/17 12:08 PM.  Always use your most recent med list. amLODIPine 10 mg tablet Commonly known as:  Garcia Cater TAKE ONE TABLET BY MOUTH ONCE DAILY  
  
 aspirin 81 mg chewable tablet Take 81 mg by mouth daily. atorvastatin 40 mg tablet Commonly known as:  LIPITOR  
TAKE ONE TABLET BY MOUTH ONCE DAILY  
  
 BENADRYL ALLERGY 25 mg tablet Generic drug:  diphenhydrAMINE Take 25 mg by mouth every six (6) hours as needed for Sleep. chlorthalidone 25 mg tablet Commonly known as:  HYGROTEN  
TAKE ONE TABLET BY MOUTH ONCE DAILY  
  
 clotrimazole-betamethasone 1-0.05 % lotion Commonly known as:  Ger Stalls Apply to the effected area every morning  
  
 glipiZIDE 10 mg tablet Commonly known as:  GLUCOTROL  
TAKE 1 TABLET BY MOUTH WITH BREAKFAST AND TAKE 1&1/2 TABLETS BY MOUTH WITH DINNER  
  
 glycerin-mineral oil-lanolin topical cream  
Commonly known as:  EUCERIN PLUS Apply twice daily as needed JANUMET 50-1,000 mg per tablet Generic drug:  SITagliptin-metFORMIN  
TAKE ONE TABLET BY MOUTH WITH BREAKFAST AND ONE TABLET BY MOUTH WITH DINNER. THIS REPLACES METFORMIN AND JANUVIA  
  
 KLOR-CON M20 20 mEq tablet Generic drug:  potassium chloride TAKE ONE TABLET BY MOUTH ONCE DAILY  
  
 LANTUS SOLOSTAR 100 unit/mL (3 mL) Inpn Generic drug:  insulin glargine INJECT 9 UNITS SUBCUTANEOUSLY ONCE DAILY  
  
 lisinopril 40 mg tablet Commonly known as:  PRINIVIL, ZESTRIL  
TAKE ONE TABLET BY MOUTH ONCE DAILY FOR  BLOOD  PRESSURE  AND  KIDNEY  PROTECTION  
  
 * nebivolol 20 mg tablet Commonly known as:  BYSTOLIC Take 1 tablet by mouth daily. * BYSTOLIC 10 mg tablet Generic drug:  nebivolol TAKE TWO TABLETS BY MOUTH ONCE DAILY  
  
 nitroglycerin 0.4 mg SL tablet Commonly known as:  NITROSTAT  
by SubLINGual route every five (5) minutes as needed. omeprazole 20 mg capsule Commonly known as:  PRILOSEC Take 20 mg by mouth daily. PEPCID 20 mg tablet Generic drug:  famotidine Take 20 mg by mouth daily. sildenafil citrate 50 mg tablet Commonly known as:  VIAGRA Take 1 Tab by mouth as needed for Other (ED. ). Do not take nitroglycerine tablets. TYLENOL 325 mg tablet Generic drug:  acetaminophen Take  by mouth every four (4) hours as needed for Pain. VITAMIN D3 2,000 unit Tab Generic drug:  cholecalciferol (vitamin D3) Take  by mouth.  
  
 white petrolatum topical onitment Commonly known as:  [address] Apply to the affected area every morning. To uses once the rash is gone. * Notice: This list has 2 medication(s) that are the same as other medications prescribed for you. Read the directions carefully, and ask your doctor or other care provider to review them with you. We Performed the Following AMB POC HEMOGLOBIN A1C [22489 CPT(R)] HM DIABETES FOOT EXAM [HM7 Custom] Follow-up Instructions Return in about 6 months (around 5/21/2018). Patient Instructions 1) Continue the Lantus 8 units every day (insulin) 2) Continue the Glipizide one pill with breakfast and increase your dinner dose to two pills with dinner. 3) Continue the Janumet 50-1000mg with breakfast and with dinner. Introducing [facility]! [facility] introduces [employer] patient portal. Now you can access parts of your medical record, email your doctor's office, and request medication refills online. 1. In your internet browser, go to [email or web address] 2. Click on the First Time User? Click Here link in the Sign In box. You will see the New Member Sign Up page. 3. Enter your Wishpot Access Code exactly as it appears below.  You will not need to use this code after youve completed the sign-up process. If you do not sign up before the expiration date, you must request a new code. · [employer] Access Code: [email or web address] Expires: 2/19/2018 10:44 AM 
 
4. Enter the last four digits of your Social Security Number (xxxx) and Date of Birth (mm/dd/yyyy) as indicated and click Submit. You will be taken to the next sign-up page. 5. Create a [employer] ID. This will be your [employer] login ID and cannot be changed, so think of one that is secure and easy to remember. 6. Create a [employer] password. You can change your password at any time. 7. Enter your Password Reset Question and Answer. This can be used at a later time if you forget your password. 8. Enter your e-mail address. You will receive e-mail notification when new information is available in [address] 9. Click Sign Up. You can now view and download portions of your medical record. 10. Click the Download Summary menu link to download a portable copy of your medical information. If you have questions, please visit the Frequently Asked Questions section of the [employer] website. Remember, [employer] is NOT to be used for urgent needs. For medical emergencies, dial 911. Now available from your iPhone and Android! Please provide this summary of care documentation to your next provider. If you have any questions after today's visit, please call [phone].

## 2017-11-21 NOTE — PROGRESS NOTES
NAME:  Nadiya Soliz   :   1955   MRN:   [id number]   PCP:  No primary care provider on file.           Subjective: The patient is a 60y.o. year old male  who returns for a routine follow-up on CAD, HTN, hyperlipidemia. Since the last visit, patient reports no change in exercise tolerance, chest pain, edema, medication intolerance, palpitations, PND/orthopnea wheezing, sputum, syncope, dizziness or light headedness. Has some PERAZA, continues to smoke. Past Medical History:   Diagnosis Date    CAD (coronary artery disease)     COPD     CVA (cerebral infarction) 3/24/2012    Diabetes ([facility]     Hypertension     Other ill-defined conditions(799.89)     high cholesterol    [facility] 3/2012    from previous medical record       Social History   Substance Use Topics    Smoking status: Current Every Day Smoker     Packs/day: 1.00     Years: 40.00     Types: Cigarettes    Smokeless tobacco: Never Used    Alcohol use Yes      Comment: occasionally      Family History   Problem Relation Age of Onset    Coronary Artery Disease Father     Heart Disease Mother     Coronary Artery Disease Other      Mother and brother in their 52's        Review of Systems  Constitutional: Negative for fever, chills, and diaphoresis. Respiratory: Negative for cough, hemoptysis, sputum production, Reports shortness of breath and wheezing. Cardiovascular: Negative for chest pain, palpitations, orthopnea, claudication, leg swelling and PND. Gastrointestinal: Negative for heartburn, nausea, vomiting, blood in stool and melena. Genitourinary: Negative for dysuria and flank pain. Musculoskeletal: Negative for joint pain and back pain. Skin: Negative for rash. Neurological: Negative for focal weakness, seizures, loss of consciousness, weakness and headaches. Endo/Heme/Allergies: Does not bruise/bleed easily. Psychiatric/Behavioral: Negative for memory loss.  The patient does not have insomnia. Objective:       Vitals:    11/21/17 0949 11/21/17 0957   BP: 130/66 130/64   Pulse: 62    Resp: 20    SpO2: 95%    Weight: 212 lb 6.4 oz (96.3 kg)    Height: 5' 9\" (1.753 m)     Body mass index is 31.37 kg/(m^2). General PE    Gen: NAD     Mental Status - Alert. General Appearance - Not in acute distress. Neck - no JVD     Chest and Lung Exam     Inspection: Accessory muscles - No use of accessory muscles in breathing. Auscultation:   Breath sounds: - Normal.     Cardiovascular   Inspection: Jugular vein - Bilateral - Inspection Normal.   Palpation/Percussion:   Apical Impulse: - Normal.   Auscultation: Rhythm - Regular. Heart Sounds - S1 WNL and S2 WNL. No S3 or S4. Murmurs & Other Heart Sounds: Auscultation of the heart reveals - No Murmurs. Peripheral Vascular   Upper Extremity: Inspection - Bilateral - No Cyanotic nailbeds or Digital clubbing. Lower Extremity:   Palpation: Edema - Bilateral - No edema. Abdomen: Soft, non-tender, bowel sounds are active. Neuro: A&O times 3, CN and motor grossly WNL      Data Review:     EKG -  Sinus  Rhythm   -  Negative T-waves  -Possible  Anterior  ischemia. Allergies reviewed  No Known Allergies    Medications reviewed  Current Outpatient Prescriptions   Medication Sig    omeprazole (PRILOSEC) 20 mg capsule Take 20 mg by mouth daily.  BYSTOLIC 10 mg tablet TAKE TWO TABLETS BY MOUTH ONCE DAILY    chlorthalidone (HYGROTEN) 25 mg tablet TAKE ONE TABLET BY MOUTH ONCE DAILY    lisinopril (PRINIVIL, ZESTRIL) 40 mg tablet TAKE ONE TABLET BY MOUTH ONCE DAILY FOR  BLOOD  PRESSURE  AND  KIDNEY  PROTECTION    JANUMET 50-1,000 mg per tablet TAKE ONE TABLET BY MOUTH WITH BREAKFAST AND ONE TABLET BY MOUTH WITH DINNER.  THIS REPLACES METFORMIN AND JANUVIA    glipiZIDE (GLUCOTROL) 10 mg tablet TAKE 1 TABLET BY MOUTH WITH BREAKFAST AND TAKE 1&1/2 TABLETS BY MOUTH WITH DINNER    white petrolatum (WHITE PETROLEUM JELLY) topical onitment Apply to the affected area every morning. To uses once the rash is gone.  clotrimazole-betamethasone (LOTRISONE) 1-0.05 % lotion Apply to the effected area every morning    LANTUS SOLOSTAR 100 unit/mL (3 mL) pen INJECT 9 UNITS SUBCUTANEOUSLY ONCE DAILY (Patient taking differently: INJECT 10 UNITS SUBCUTANEOUSLY ONCE DAILY)    KLOR-CON M20 20 mEq tablet TAKE ONE TABLET BY MOUTH ONCE DAILY    amLODIPine (NORVASC) 10 mg tablet TAKE ONE TABLET BY MOUTH ONCE DAILY    atorvastatin (LIPITOR) 40 mg tablet TAKE ONE TABLET BY MOUTH ONCE DAILY    glycerin-mineral oil-lanolin (EUCERIN PLUS) topical cream Apply twice daily as needed    cholecalciferol, vitamin D3, (VITAMIN D3) 2,000 unit tab Take  by mouth.  nebivolol (BYSTOLIC) 20 mg tablet Take 1 tablet by mouth daily.  acetaminophen (TYLENOL) 325 mg tablet Take  by mouth every four (4) hours as needed for Pain.  diphenhydrAMINE (BENADRYL ALLERGY) 25 mg tablet Take 25 mg by mouth every six (6) hours as needed for Sleep.  nitroglycerin (NITROSTAT) 0.4 mg SL tablet by SubLINGual route every five (5) minutes as needed.  aspirin 81 mg chewable tablet Take 81 mg by mouth daily.  sildenafil citrate (VIAGRA) 50 mg tablet Take 1 Tab by mouth as needed for Other (ED. ). Do not take nitroglycerine tablets.  famotidine (PEPCID) 20 mg tablet Take 20 mg by mouth daily. No current facility-administered medications for this visit. Assessment:       ICD-10-CM ICD-9-CM    1. Coronary artery disease involving native coronary artery of native heart without angina pectoris I25.10 414.01 AMB POC EKG ROUTINE W/ 12 LEADS, INTER & REP   2. Essential hypertension I10 401.9    3. Mixed hyperlipidemia E78.2 272.2    4. Benign essential hypertension I10 401.1    5.  BMI 31.0-31.9,adult Z68.31 V85.31         Orders Placed This Encounter    AMB POC EKG ROUTINE W/ 12 LEADS, INTER & REP     Order Specific Question:   Reason for Exam:     Answer:   ROUTINE    omeprazole (PRILOSEC) 20 mg capsule     Sig: Take 20 mg by mouth daily. Patient Active Problem List   Diagnosis Code    [facility]non-ST elevated [facility] infarction) [facility] I21.4    CAD (coronary artery disease) I25.10    Unstable angina pectoris (HCC) I20.0    S/P PTCA (percutaneous transluminal coronary angioplasty) Z98.61    Hyperlipidemia LDL goal < 70 E78.5    Tobacco abuse disorder Z72.0    Benign essential hypertension I10    Cardiomyopathy ([facility] I42.9    Chronic systolic heart failure (HCC) I50.22    History of noncompliance with medical treatment Z91.19    S/P CABG x 4 Z95.1    Acute, but ill-defined, cerebrovascular disease I67.89    S/P coronary artery stent placement Z95.5    Essential hypertension I10    Mixed hyperlipidemia E78.2       Plan:     Patient presents for follow up, feeling well and stable from cardiac standpoint. 1.  Atherosclerotic heart disease: Clinically stable continue current therapy long-term aspirin. 2.  Hypertension controlled  3. Hyperlipidemia: Done in July with low HDL and elevated trigs. On lipitor 40mg. 4.  Tobacco abuse: Continues to smoke, declines any intervention for cessation. Karen Peters, [address] [facility]    11/21/2017         Agree with note as outlined by  NP. I confirm findings in history and physical exam. No additional findings noted. Agree with plan as outlined above.      Christine Doe MD

## 2017-11-21 NOTE — PROGRESS NOTES
Chief Complaint   Patient presents with    Diabetes     pcp and pharmacy verified. Eye exam recent. Release signed   Records since his last visit reviewed. History of Present Illness: Arlette Alvarado is a 60 y.o. male here for follow up of diabetes. At his last visit in May 2017 his A1C was 7.7% on Janumet 50/1000mg BID, glipizide 10mg with breakfast and 15mg with dinner 10mg BID and Lantus 10 units daily. Pt instructed to keep up the good work. He saw Dr. Earl Aaron today and everything was looking stable. His A1C today was 8.6%    Pt is taking Janumet 50/1000mg BID, Lantus 8 units daily and Glipizide 10mg with breakfast and 10mg with dinner. Pt notes he has been having issues of low BGs during the day. He notes he has decreased his Lantus to 8 units daily and he notes his BGs have not been dropping any longer. He noted that the low BGs were happening during the day. He takes his Lantus in the AM.  He notes he decreased his Glipizide to 10mg BID from 10mg in the AM and 15mg with dinner. He denies issues of CP, SOB, polyuria and polydipsia. Pt is eating 3 meals daily. His largest meal is Dinner. For breakfast every day. Today he had a Suazo and egg sandwich and coffee. He will have lunch around 1PM, he typically has a ham or turkey sandwich and coffee   Last night for dinner he had pancakes, suazo and eggs and coffee. He denies any low blood sugars at night or early morning. He tends to have his low BGs after lunch. He is followed by Dr. Earl Aaron of cardiology. He stays busy chopping wood and yard work and work around [employer]. 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 No history of retinopathy, neuropathy, but has some nephropathy. Last eye exam was Octobe2017, no retinopathy, will request these records.      Current Outpatient Prescriptions   Medication Sig    omeprazole (PRILOSEC) 20 mg capsule Take 20 mg by mouth daily.    BYSTOLIC 10 mg tablet TAKE TWO TABLETS BY MOUTH ONCE DAILY    chlorthalidone (HYGROTEN) 25 mg tablet TAKE ONE TABLET BY MOUTH ONCE DAILY    lisinopril (PRINIVIL, ZESTRIL) 40 mg tablet TAKE ONE TABLET BY MOUTH ONCE DAILY FOR  BLOOD  PRESSURE  AND  KIDNEY  PROTECTION    JANUMET 50-1,000 mg per tablet TAKE ONE TABLET BY MOUTH WITH BREAKFAST AND ONE TABLET BY MOUTH WITH DINNER. THIS REPLACES METFORMIN AND JANUVIA    glipiZIDE (GLUCOTROL) 10 mg tablet TAKE 1 TABLET BY MOUTH WITH BREAKFAST AND TAKE 1&1/2 TABLETS BY MOUTH WITH DINNER    white petrolatum (WHITE PETROLEUM JELLY) topical onitment Apply to the affected area every morning. To uses once the rash is gone.  clotrimazole-betamethasone (LOTRISONE) 1-0.05 % lotion Apply to the effected area every morning    LANTUS SOLOSTAR 100 unit/mL (3 mL) pen INJECT 9 UNITS SUBCUTANEOUSLY ONCE DAILY (Patient taking differently: INJECT 8 UNITS SUBCUTANEOUSLY ONCE DAILY)    KLOR-CON M20 20 mEq tablet TAKE ONE TABLET BY MOUTH ONCE DAILY    amLODIPine (NORVASC) 10 mg tablet TAKE ONE TABLET BY MOUTH ONCE DAILY    atorvastatin (LIPITOR) 40 mg tablet TAKE ONE TABLET BY MOUTH ONCE DAILY    glycerin-mineral oil-lanolin (EUCERIN PLUS) topical cream Apply twice daily as needed    cholecalciferol, vitamin D3, (VITAMIN D3) 2,000 unit tab Take  by mouth.  nebivolol (BYSTOLIC) 20 mg tablet Take 1 tablet by mouth daily.  acetaminophen (TYLENOL) 325 mg tablet Take  by mouth every four (4) hours as needed for Pain.  diphenhydrAMINE (BENADRYL ALLERGY) 25 mg tablet Take 25 mg by mouth every six (6) hours as needed for Sleep.  nitroglycerin (NITROSTAT) 0.4 mg SL tablet by SubLINGual route every five (5) minutes as needed.  aspirin 81 mg chewable tablet Take 81 mg by mouth daily.  sildenafil citrate (VIAGRA) 50 mg tablet Take 1 Tab by mouth as needed for Other (ED. ). Do not take nitroglycerine tablets.     famotidine (PEPCID) 20 mg tablet Take 20 mg by mouth daily. No current facility-administered medications for this visit. No Known Allergies  Review of Systems:  - Eyes: no blurry vision or double vision  - Cardiovascular: no chest pain  - Respiratory: no shortness of breath  - Musculoskeletal: no myalgias  - Neurological: no numbness/tingling in extremities    Physical Examination:  Blood pressure 111/72, pulse 72, height 5' 9\" (1.753 m), weight 210 lb 12.8 oz (95.6 kg). - General: pleasant, no distress, good eye contact   - Neck: no carotid bruits  - Cardiovascular: regular, normal rate, nl s1 and s2, no m/r/g, 2+ DP pulses   - Respiratory: clear bilaterally  - Integumentary: no edema, no foot ulcers, sensation to monofilament and vibration intact bilaterally  - Psychiatric: normal mood and affect    Data Reviewed:   His A1C today was 8.6%    Assessment/Plan:   1) DM > His A1C today was 8.6%. Pt was having low BGs on the Lantus 9 or 10 units, but since being on the 8 units a day of Lantus he has not had anymore low BGs. Pt to continue the Lantus 8 units daily, Glipizide 10mg with breakfast and 20mg with dinner and Metformin/Januvia 50/1000mg BID. His Goal A1C is under 8%. 2) HTN > His BP is at goal and is on an ACEI for renal protection    3) HLD > Pt is tolerating Lipitor 40mg daily, which is a high intensity statin regimen. His LDL and TC was at goal in July 2017. Pt voices understanding and agreement with the plan. RTC 6 months    We spent 40 minutes of face to face time together and > 50% of the time was spent in counseling on the above issues. Follow-up Disposition:  Return in about 6 months (around 5/21/2018).     Copy sent to:  Dr. Cely Aguilera

## 2017-11-21 NOTE — MR AVS SNAPSHOT
Visit Information Date & Time Provider Department Dept. Phone Encounter #  
 11/21/2017  9:45 AM [facility] [phone] [id number] Your Appointments 11/21/2017 11:30 AM  
Follow Up with MD Anita [facility]) Appt Note: 6 month f/u    Diabetes [address] [place] [phone] [address] Upcoming Health Maintenance Date Due DTaP/Tdap/Td series (1 - Tdap) 7/23/1975 FOBT Q 1 YEAR AGE 50-75 7/23/2004 EYE EXAM RETINAL OR DILATED Q1 3/15/2011 ZOSTER VACCINE AGE 60> 5/23/2014 Influenza Age 5 to Adult 8/1/2017 HEMOGLOBIN A1C Q6M 1/14/2018 FOOT EXAM Q1 5/19/2018 MICROALBUMIN Q1 7/14/2018 LIPID PANEL Q1 7/14/2018 Allergies as of 11/21/2017  Review Complete On: 11/21/2017 By: Aliyah Ortiz LPN No Known Allergies Current Immunizations  Reviewed on 5/29/2012 Name Date Influenza Vaccine Split  Deferred (Patient Refused), 3/27/2012  5:26 PM  
 ZZZ-RETIRED (DO NOT USE) Pneumococcal Vaccine (Unspecified Type) 3/25/2012  4:13 PM  
  
 Not reviewed this visit You Were Diagnosed With   
  
 Codes Comments Coronary artery disease involving native coronary artery of native heart without angina pectoris    -  Primary ICD-10-CM: I25.10 ICD-9-CM: 414.01 Essential hypertension     ICD-10-CM: I10 
ICD-9-CM: 401.9 Mixed hyperlipidemia     ICD-10-CM: E78.2 ICD-9-CM: 272.2 Benign essential hypertension     ICD-10-CM: I10 
ICD-9-CM: 401.1 BMI 31.0-31.9,adult     ICD-10-CM: Z68.31 
ICD-9-CM: V85.31 Vitals BP Pulse Resp Height(growth percentile) Weight(growth percentile) SpO2  
 130/64 (BP 1 Location: Right arm, BP Patient Position: Sitting) 62 20 5' 9\" (1.753 m) 212 lb 6.4 oz (96.3 kg) 95% BMI Smoking Status 31.37 kg/m2 Current Every Day Smoker Vitals History BMI and BSA Data Body Mass Index Body Surface Area  
 31.37 kg/m 2 2.17 m 2 Preferred Pharmacy Pharmacy Name Phone [facility] [address], [address] [facility] [address] [facility]  Your Updated Medication List  
  
   
This list is accurate as of: 11/21/17 10:44 AM.  Always use your most recent med list. amLODIPine 10 mg tablet Commonly known as:  Elyn Coffer TAKE ONE TABLET BY MOUTH ONCE DAILY  
  
 aspirin 81 mg chewable tablet Take 81 mg by mouth daily. atorvastatin 40 mg tablet Commonly known as:  LIPITOR  
TAKE ONE TABLET BY MOUTH ONCE DAILY  
  
 BENADRYL ALLERGY 25 mg tablet Generic drug:  diphenhydrAMINE Take 25 mg by mouth every six (6) hours as needed for Sleep. chlorthalidone 25 mg tablet Commonly known as:  HYGROTEN  
TAKE ONE TABLET BY MOUTH ONCE DAILY  
  
 clotrimazole-betamethasone 1-0.05 % lotion Commonly known as:  [place] Apply to the effected area every morning  
  
 glipiZIDE 10 mg tablet Commonly known as:  GLUCOTROL  
TAKE 1 TABLET BY MOUTH WITH BREAKFAST AND TAKE 1&1/2 TABLETS BY MOUTH WITH DINNER  
  
 glycerin-mineral oil-lanolin topical cream  
Commonly known as:  EUCERIN PLUS Apply twice daily as needed JANUMET 50-1,000 mg per tablet Generic drug:  SITagliptin-metFORMIN  
TAKE ONE TABLET BY MOUTH WITH BREAKFAST AND ONE TABLET BY MOUTH WITH DINNER. THIS REPLACES METFORMIN AND JANUVIA  
  
 KLOR-CON M20 20 mEq tablet Generic drug:  potassium chloride TAKE ONE TABLET BY MOUTH ONCE DAILY  
  
 LANTUS SOLOSTAR 100 unit/mL (3 mL) Inpn Generic drug:  insulin glargine INJECT 9 UNITS SUBCUTANEOUSLY ONCE DAILY  
  
 lisinopril 40 mg tablet Commonly known as:  PRINIVIL, ZESTRIL  
TAKE ONE TABLET BY MOUTH ONCE DAILY FOR  BLOOD  PRESSURE  AND  KIDNEY  PROTECTION  
  
 * nebivolol 20 mg tablet Commonly known as:  BYSTOLIC  
 Take 1 tablet by mouth daily. * BYSTOLIC 10 mg tablet Generic drug:  nebivolol TAKE TWO TABLETS BY MOUTH ONCE DAILY  
  
 nitroglycerin 0.4 mg SL tablet Commonly known as:  NITROSTAT  
by SubLINGual route every five (5) minutes as needed. omeprazole 20 mg capsule Commonly known as:  PRILOSEC Take 20 mg by mouth daily. PEPCID 20 mg tablet Generic drug:  famotidine Take 20 mg by mouth daily. sildenafil citrate 50 mg tablet Commonly known as:  VIAGRA Take 1 Tab by mouth as needed for Other (ED. ). Do not take nitroglycerine tablets. TYLENOL 325 mg tablet Generic drug:  acetaminophen Take  by mouth every four (4) hours as needed for Pain. VITAMIN D3 2,000 unit Tab Generic drug:  cholecalciferol (vitamin D3) Take  by mouth.  
  
 white petrolatum topical onitment Commonly known as:  [address] Apply to the affected area every morning. To uses once the rash is gone. * Notice: This list has 2 medication(s) that are the same as other medications prescribed for you. Read the directions carefully, and ask your doctor or other care provider to review them with you. We Performed the Following AMB POC EKG ROUTINE W/ 12 LEADS, INTER & REP [42720 CPT(R)] Introducing [facility]! Flo Colón introduces Beaker patient portal. Now you can access parts of your medical record, email your doctor's office, and request medication refills online. 1. In your internet browser, go to [email or web address] 2. Click on the First Time User? Click Here link in the Sign In box. You will see the New Member Sign Up page. 3. Enter your Beaker Access Code exactly as it appears below. You will not need to use this code after youve completed the sign-up process. If you do not sign up before the expiration date, you must request a new code. · Beaker Access Code: [email or web address] Expires: 2/19/2018 10:44 AM 
 
 4. Enter the last four digits of your Social Security Number (xxxx) and Date of Birth (mm/dd/yyyy) as indicated and click Submit. You will be taken to the next sign-up page. 5. Create a BookFresh ID. This will be your BookFresh login ID and cannot be changed, so think of one that is secure and easy to remember. 6. Create a BookFresh password. You can change your password at any time. 7. Enter your Password Reset Question and Answer. This can be used at a later time if you forget your password. 8. Enter your e-mail address. You will receive e-mail notification when new information is available in [address] 9. Click Sign Up. You can now view and download portions of your medical record. 10. Click the Download Summary menu link to download a portable copy of your medical information. If you have questions, please visit the Frequently Asked Questions section of the Book[employer] website. Remember, BookFresh is NOT to be used for urgent needs. For medical emergencies, dial 911. Now available from your iPhone and Android! Please provide this summary of care documentation to your next provider. If you have any questions after today's visit, please call [phone].

## 2017-11-21 NOTE — PROGRESS NOTES
1. Have you been to the ER, urgent care clinic since your last visit? Hospitalized since your last visit? NO    2. Have you seen or consulted any other health care providers outside of the [address] [place] [address] since your last visit? Include any pap smears or colon screening. NO    6 MONTH FOLLOW UP.  NO CARDIAC C/O.

## 2018-02-01 DIAGNOSIS — I10 ESSENTIAL HYPERTENSION: ICD-10-CM

## 2018-02-01 RX ORDER — ATORVASTATIN CALCIUM 40 MG/1
TABLET, FILM COATED ORAL
Qty: 30 TAB | Refills: 12 | Status: SHIPPED | OUTPATIENT
Start: 2018-02-01 | End: 2019-02-05 | Stop reason: SDUPTHER

## 2018-02-01 RX ORDER — AMLODIPINE BESYLATE 10 MG/1
TABLET ORAL
Qty: 30 TAB | Refills: 12 | Status: SHIPPED | OUTPATIENT
Start: 2018-02-01 | End: 2019-02-05 | Stop reason: SDUPTHER

## 2018-02-01 RX ORDER — POTASSIUM CHLORIDE 1500 MG/1
TABLET, EXTENDED RELEASE ORAL
Qty: 30 TAB | Refills: 12 | Status: SHIPPED | OUTPATIENT
Start: 2018-02-01 | End: 2018-11-28 | Stop reason: SDUPTHER

## 2018-02-01 RX ORDER — LISINOPRIL 40 MG/1
TABLET ORAL
Qty: 90 TAB | Refills: 1 | Status: SHIPPED | OUTPATIENT
Start: 2018-02-01 | End: 2018-08-01 | Stop reason: SDUPTHER

## 2018-03-08 RX ORDER — SITAGLIPTIN AND METFORMIN HYDROCHLORIDE 50; 1000 MG/1; MG/1
TABLET, FILM COATED ORAL
Qty: 60 TAB | Refills: 6 | Status: SHIPPED | OUTPATIENT
Start: 2018-03-08 | End: 2018-10-08 | Stop reason: SDUPTHER

## 2018-04-09 RX ORDER — CHLORTHALIDONE 25 MG/1
TABLET ORAL
Qty: 30 TAB | Refills: 6 | Status: SHIPPED | OUTPATIENT
Start: 2018-04-09 | End: 2018-11-03 | Stop reason: SDUPTHER

## 2018-04-25 RX ORDER — NEBIVOLOL HYDROCHLORIDE 10 MG/1
TABLET ORAL
Qty: 60 TAB | Refills: 6 | Status: SHIPPED | OUTPATIENT
Start: 2018-04-25 | End: 2018-12-06 | Stop reason: SDUPTHER

## 2018-06-07 ENCOUNTER — OFFICE VISIT (OUTPATIENT)
Dept: ENDOCRINOLOGY | Age: 63
End: 2018-06-07

## 2018-06-07 ENCOUNTER — OFFICE VISIT (OUTPATIENT)
Dept: CARDIOLOGY CLINIC | Age: 63
End: 2018-06-07

## 2018-06-07 VITALS
HEART RATE: 73 BPM | BODY MASS INDEX: 30.91 KG/M2 | HEIGHT: 69 IN | OXYGEN SATURATION: 96 % | DIASTOLIC BLOOD PRESSURE: 62 MMHG | WEIGHT: 208.7 LBS | SYSTOLIC BLOOD PRESSURE: 118 MMHG | RESPIRATION RATE: 20 BRPM

## 2018-06-07 VITALS
DIASTOLIC BLOOD PRESSURE: 76 MMHG | BODY MASS INDEX: 30.6 KG/M2 | WEIGHT: 206.6 LBS | HEART RATE: 71 BPM | HEIGHT: 69 IN | SYSTOLIC BLOOD PRESSURE: 111 MMHG

## 2018-06-07 DIAGNOSIS — I10 BENIGN ESSENTIAL HYPERTENSION: ICD-10-CM

## 2018-06-07 DIAGNOSIS — Z79.4 TYPE 2 DIABETES MELLITUS WITH COMPLICATION, WITH LONG-TERM CURRENT USE OF INSULIN (HCC): Primary | ICD-10-CM

## 2018-06-07 DIAGNOSIS — Z72.0 TOBACCO ABUSE DISORDER: ICD-10-CM

## 2018-06-07 DIAGNOSIS — E11.21 TYPE 2 DIABETES MELLITUS WITH DIABETIC NEPHROPATHY, WITH LONG-TERM CURRENT USE OF INSULIN (HCC): ICD-10-CM

## 2018-06-07 DIAGNOSIS — Z95.5 S/P CORONARY ARTERY STENT PLACEMENT: ICD-10-CM

## 2018-06-07 DIAGNOSIS — Z95.1 S/P CABG X 4: ICD-10-CM

## 2018-06-07 DIAGNOSIS — Z79.4 TYPE 2 DIABETES MELLITUS WITH DIABETIC NEPHROPATHY, WITH LONG-TERM CURRENT USE OF INSULIN (HCC): ICD-10-CM

## 2018-06-07 DIAGNOSIS — E78.2 MIXED HYPERLIPIDEMIA: ICD-10-CM

## 2018-06-07 DIAGNOSIS — E11.8 TYPE 2 DIABETES MELLITUS WITH COMPLICATION, WITH LONG-TERM CURRENT USE OF INSULIN (HCC): ICD-10-CM

## 2018-06-07 DIAGNOSIS — I50.22 CHRONIC SYSTOLIC HEART FAILURE (HCC): ICD-10-CM

## 2018-06-07 DIAGNOSIS — E78.5 HYPERLIPIDEMIA WITH TARGET LOW DENSITY LIPOPROTEIN (LDL) CHOLESTEROL LESS THAN 70 MG/DL: ICD-10-CM

## 2018-06-07 DIAGNOSIS — I10 ESSENTIAL HYPERTENSION: ICD-10-CM

## 2018-06-07 DIAGNOSIS — Z79.4 TYPE 2 DIABETES MELLITUS WITH COMPLICATION, WITH LONG-TERM CURRENT USE OF INSULIN (HCC): ICD-10-CM

## 2018-06-07 DIAGNOSIS — E11.8 TYPE 2 DIABETES MELLITUS WITH COMPLICATION, WITH LONG-TERM CURRENT USE OF INSULIN (HCC): Primary | ICD-10-CM

## 2018-06-07 DIAGNOSIS — I25.10 CORONARY ARTERY DISEASE INVOLVING NATIVE CORONARY ARTERY OF NATIVE HEART WITHOUT ANGINA PECTORIS: Primary | ICD-10-CM

## 2018-06-07 LAB — HBA1C MFR BLD HPLC: 8.8 %

## 2018-06-07 RX ORDER — GLIPIZIDE 10 MG/1
10 TABLET ORAL 2 TIMES DAILY
Qty: 180 TAB | Refills: 3 | Status: SHIPPED | OUTPATIENT
Start: 2018-06-07 | End: 2018-07-22 | Stop reason: SDUPTHER

## 2018-06-07 RX ORDER — INSULIN GLARGINE 100 [IU]/ML
INJECTION, SOLUTION SUBCUTANEOUS
Qty: 15 ML | Refills: 3 | Status: SHIPPED | OUTPATIENT
Start: 2018-06-07 | End: 2018-08-01 | Stop reason: SDUPTHER

## 2018-06-07 NOTE — MR AVS SNAPSHOT
[address] [facility] 
[phone] Patient: Burt Dietz 
MRN: [id number] YCP:8/55/9405 Visit Information Date & Time Provider Department Dept. Phone Encounter #  
 6/7/2018  1:00 PM Yasmin Vaughn, [address] [place] [address] [facility] [phone] [id number] Your Appointments 6/7/2018  2:30 PM  
Follow Up with Анна Guaman MD  
[facility] [address] [place] [address]) Appt Note: f/u            dm              6 mon One [facility] [address] [phone] [address] [place] [address]  
  
    
 11/28/2018 10:15 AM  
ESTABLISHED PATIENT with Yasmin Vaughn MD  
[facility] [address] [place] [address]) Appt Note: Dr Hawkins Colton  
 [address] [facility]  
[phone] [address] [facility] Upcoming Health Maintenance Date Due DTaP/Tdap/Td series (1 - Tdap) 7/23/1975 FOBT Q 1 YEAR AGE 50-75 7/23/2004 EYE EXAM RETINAL OR DILATED Q1 3/15/2011 ZOSTER VACCINE AGE 60> 5/23/2014 MEDICARE YEARLY EXAM 3/14/2018 HEMOGLOBIN A1C Q6M 5/21/2018 MICROALBUMIN Q1 7/14/2018 LIPID PANEL Q1 7/14/2018 Influenza Age 5 to Adult 8/1/2018 FOOT EXAM Q1 11/21/2018 Allergies as of 6/7/2018  Review Complete On: 6/7/2018 By: Joey Lei LPN No Known Allergies Current Immunizations  Reviewed on 5/29/2012 Name Date Influenza Vaccine Split  Deferred (Patient Refused), 3/27/2012  5:26 PM  
 ZZZ-RETIRED (DO NOT USE) Pneumococcal Vaccine (Unspecified Type) 3/25/2012  4:13 PM  
  
 Not reviewed this visit You Were Diagnosed With   
  
 Codes Comments Coronary artery disease involving native coronary artery of native heart without angina pectoris    -  Primary ICD-10-CM: I25.10 ICD-9-CM: 414.01 S/P CABG x 4     ICD-10-CM: Z95.1 ICD-9-CM: V45.81 S/P coronary artery stent placement     ICD-10-CM: Z95.5 ICD-9-CM: V45.82 Benign essential hypertension     ICD-10-CM: I10 
ICD-9-CM: 401.1 Hyperlipidemia with target low density lipoprotein (LDL) cholesterol less than 70 mg/dL     ICD-10-CM: E78.5 ICD-9-CM: 272.4 Tobacco abuse disorder     ICD-10-CM: Z72.0 ICD-9-CM: 305.1 Type 2 diabetes mellitus with complication, with long-term current use of insulin (HCC)     ICD-10-CM: E11.8, Z79.4 ICD-9-CM: 250.90, V58.67 Chronic systolic heart failure (HCC)     ICD-10-CM: I50.22 ICD-9-CM: 428.22 Vitals BP Pulse Resp Height(growth percentile) Weight(growth percentile) SpO2  
 118/62 (BP 1 Location: Right arm, BP Patient Position: Sitting) 73 20 5' 9\" (1.753 m) 208 lb 11.2 oz (94.7 kg) 96% BMI Smoking Status 30.82 kg/m2 Current Every Day Smoker Vitals History BMI and BSA Data Body Mass Index Body Surface Area  
 30.82 kg/m 2 2.15 m 2 Preferred Pharmacy Pharmacy Name Phone [facility] [address] [place] [address] [phone] Your Updated Medication List  
  
   
This list is accurate as of 6/7/18  2:15 PM.  Always use your most recent med list. amLODIPine 10 mg tablet Commonly known as:  Mehreen Grebe TAKE ONE TABLET BY MOUTH ONCE DAILY  
  
 aspirin 81 mg chewable tablet Take 81 mg by mouth daily. atorvastatin 40 mg tablet Commonly known as:  LIPITOR  
TAKE ONE TABLET BY MOUTH ONCE DAILY  
  
 BENADRYL ALLERGY 25 mg tablet Generic drug:  diphenhydrAMINE Take 25 mg by mouth every six (6) hours as needed for Sleep. BYSTOLIC 10 mg tablet Generic drug:  nebivolol TAKE TWO TABLETS BY MOUTH ONCE DAILY  
  
 chlorthalidone 25 mg tablet Commonly known as:  HYGROTEN  
TAKE ONE TABLET BY MOUTH ONCE DAILY  
  
 clotrimazole-betamethasone 1-0.05 % lotion Commonly known as:  Jodi Bartlettffdarren Apply to the effected area every morning  
  
 glipiZIDE 10 mg tablet Commonly known as:  Asa [place] One with breakfast and two with dinner  
  
 glycerin-mineral oil-lanolin topical cream  
Commonly known as:  EUCERIN PLUS Apply twice daily as needed  
  
 insulin glargine 100 unit/mL (3 mL) Inpn Commonly known as:  LANTUS SOLOSTAR U-100 INSULIN INJECT 8 UNITS SUBCUTANEOUSLY ONCE DAILY JANUMET 50-1,000 mg per tablet Generic drug:  SITagliptin-metFORMIN  
TAKE ONE TABLET WITH BREAKFAST AND TAKE ONE TABLET WITH DINNER. THIS REPLACES METFORMIN AND JANUVIA  
  
 KLOR-CON M20 20 mEq tablet Generic drug:  potassium chloride TAKE ONE TABLET BY MOUTH ONCE DAILY  
  
 lisinopril 40 mg tablet Commonly known as:  PRINIVIL, ZESTRIL  
TAKE ONE TABLET BY MOUTH ONCE DAILY FOR  BLOOD  PRESSURE  AND  KIDNEY  PROTECTION  
  
 nitroglycerin 0.4 mg SL tablet Commonly known as:  NITROSTAT  
by SubLINGual route every five (5) minutes as needed. omeprazole 20 mg capsule Commonly known as:  PRILOSEC Take 20 mg by mouth daily. OTHER Take 1 Tab by mouth daily. HYLANDS LEG CRAMPS PEPCID 20 mg tablet Generic drug:  famotidine Take 20 mg by mouth daily. sildenafil citrate 50 mg tablet Commonly known as:  VIAGRA Take 1 Tab by mouth as needed for Other (ED. ). Do not take nitroglycerine tablets. TYLENOL 325 mg tablet Generic drug:  acetaminophen Take  by mouth every four (4) hours as needed for Pain. VITAMIN D3 2,000 unit Tab Generic drug:  cholecalciferol (vitamin D3) Take  by mouth.  
  
 white petrolatum topical onitment Commonly known as:  [address] Apply to the affected area every morning. To uses once the rash is gone. We Performed the Following AMB POC EKG ROUTINE W/ 12 LEADS, INTER & REP [07754 CPT(R)] CK [id number] CPT(R)] LIPID PANEL [56520 CPT(R)] METABOLIC PANEL, COMPREHENSIVE [66004 CPT(R)] Introducing [facility] & HEALTH SERVICES! [facility] introduces Rom[employer] patient portal. Now you can access parts of your medical record, email your doctor's office, and request medication refills online. 1. In your internet browser, go to [email or web address] 2. Click on the First Time User? Click Here link in the Sign In box. You will see the New Member Sign Up page. 3. Enter your Romotive Access Code exactly as it appears below. You will not need to use this code after youve completed the sign-up process. If you do not sign up before the expiration date, you must request a new code. · Romotive Access Code: [email or web address] Expires: 9/5/2018  2:15 PM 
 
4. Enter the last four digits of your Social Security Number (xxxx) and Date of Birth (mm/dd/yyyy) as indicated and click Submit. You will be taken to the next sign-up page. 5. Create a Romotive ID. This will be your Romotive login ID and cannot be changed, so think of one that is secure and easy to remember. 6. Create a Romotive password. You can change your password at any time. 7. Enter your Password Reset Question and Answer. This can be used at a later time if you forget your password. 8. Enter your e-mail address. You will receive e-mail notification when new information is available in [address] 9. Click Sign Up. You can now view and download portions of your medical record. 10. Click the Download Summary menu link to download a portable copy of your medical information. If you have questions, please visit the Frequently Asked Questions section of the Romotive website. Remember, Romotive is NOT to be used for urgent needs. For medical emergencies, dial 911. Now available from your iPhone and Android! Please provide this summary of care documentation to your next provider. If you have any questions after today's visit, please call [phone].

## 2018-06-07 NOTE — MR AVS SNAPSHOT
[address] [facility] [address] [address] [phone] Patient: Ramu Mcintosh 
MRN: [id number] INJ:[birth year] Visit Information Date & Time Provider Department Dept. Phone Encounter #  
 6/7/2018  2:30 PM Donovan Ramos, [address] [place] [address] [facility] and Endocrinology [phone] [id number] Follow-up Instructions Return in about 6 months (around 12/7/2018). Your Appointments 11/28/2018 10:15 AM  
ESTABLISHED PATIENT with Sue Barajas MD  
[facility] [address] [place] [address]) Appt Note: Dr Stephanie Cristina  
 [address]  
[phone] [address] Upcoming Health Maintenance Date Due DTaP/Tdap/Td series (1 - Tdap) 7/23/1975 FOBT Q 1 YEAR AGE 50-75 7/23/2004 EYE EXAM RETINAL OR DILATED Q1 3/15/2011 ZOSTER VACCINE AGE 60> 5/23/2014 MEDICARE YEARLY EXAM 3/14/2018 HEMOGLOBIN A1C Q6M 5/21/2018 MICROALBUMIN Q1 7/14/2018 LIPID PANEL Q1 7/14/2018 Influenza Age 5 to Adult 8/1/2018 FOOT EXAM Q1 11/21/2018 Allergies as of 6/7/2018  Review Complete On: 6/7/2018 By: Donovan Ramos MD  
 No Known Allergies Current Immunizations  Reviewed on 5/29/2012 Name Date Influenza Vaccine Split  Deferred (Patient Refused), 3/27/2012  5:26 PM  
 ZZZ-RETIRED (DO NOT USE) Pneumococcal Vaccine (Unspecified Type) 3/25/2012  4:13 PM  
  
 Not reviewed this visit You Were Diagnosed With   
  
 Codes Comments Type 2 diabetes mellitus with complication, with long-term current use of insulin (HCC)    -  Primary ICD-10-CM: E11.8, Z79.4 ICD-9-CM: 250.90, V58.67 Essential hypertension     ICD-10-CM: I10 
ICD-9-CM: 401.9 Mixed hyperlipidemia     ICD-10-CM: E78.2 ICD-9-CM: 272.2  Type 2 diabetes mellitus with diabetic nephropathy, with long-term current use of insulin (HCC)     ICD-10-CM: E11.21, Z79.4 ICD-9-CM: 250.40, 583.81, V58.67 Vitals BP Pulse Height(growth percentile) Weight(growth percentile) BMI Smoking Status 111/76 71 5' 9\" (1.753 m) 206 lb 9.6 oz (93.7 kg) 30.51 kg/m2 Current Every Day Smoker BMI and BSA Data Body Mass Index Body Surface Area 30.51 kg/m 2 2.14 m 2 Preferred Pharmacy Pharmacy Name Phone [facility] [address] [address] [phone] Your Updated Medication List  
  
   
This list is accurate as of 6/7/18  3:03 PM.  Always use your most recent med list. amLODIPine 10 mg tablet Commonly known as:  Raimundo Ivania TAKE ONE TABLET BY MOUTH ONCE DAILY  
  
 aspirin 81 mg chewable tablet Take 81 mg by mouth daily. atorvastatin 40 mg tablet Commonly known as:  LIPITOR  
TAKE ONE TABLET BY MOUTH ONCE DAILY  
  
 BENADRYL ALLERGY 25 mg tablet Generic drug:  diphenhydrAMINE Take 25 mg by mouth every six (6) hours as needed for Sleep. BYSTOLIC 10 mg tablet Generic drug:  nebivolol TAKE TWO TABLETS BY MOUTH ONCE DAILY  
  
 chlorthalidone 25 mg tablet Commonly known as:  HYGROTEN  
TAKE ONE TABLET BY MOUTH ONCE DAILY  
  
 clotrimazole-betamethasone 1-0.05 % lotion Commonly known as:  Georgiann Sue Apply to the effected area every morning  
  
 glipiZIDE 10 mg tablet Commonly known as:  Lawayne Early Take 1 Tab by mouth two (2) times a day. glycerin-mineral oil-lanolin topical cream  
Commonly known as:  EUCERIN PLUS Apply twice daily as needed  
  
 insulin glargine 100 unit/mL (3 mL) Inpn Commonly known as:  LANTUS SOLOSTAR U-100 INSULIN INJECT 9 UNITS SUBCUTANEOUSLY ONCE DAILY JANUMET 50-1,000 mg per tablet Generic drug:  SITagliptin-metFORMIN  
TAKE ONE TABLET WITH BREAKFAST AND TAKE ONE TABLET WITH DINNER. THIS REPLACES METFORMIN AND JANUVIA  
  
 KLOR-CON M20 20 mEq tablet Generic drug:  potassium chloride TAKE ONE TABLET BY MOUTH ONCE DAILY  
  
 lisinopril 40 mg tablet Commonly known as:  PRINIVIL, ZESTRIL  
TAKE ONE TABLET BY MOUTH ONCE DAILY FOR  BLOOD  PRESSURE  AND  KIDNEY  PROTECTION  
  
 nitroglycerin 0.4 mg SL tablet Commonly known as:  NITROSTAT  
by SubLINGual route every five (5) minutes as needed. OTHER Take 1 Tab by mouth daily. HYLANDS LEG CRAMPS PEPCID 20 mg tablet Generic drug:  famotidine Take 20 mg by mouth daily. potassium 99 mg tablet Take 99 mg by mouth two (2) times a day. TYLENOL 325 mg tablet Generic drug:  acetaminophen Take  by mouth every four (4) hours as needed for Pain. VITAMIN D3 2,000 unit Tab Generic drug:  cholecalciferol (vitamin D3) Take  by mouth.  
  
 white petrolatum topical onitment Commonly known as:  [address] [place] [address]  Apply to the affected area every morning. To uses once the rash is gone. Prescriptions Sent to Pharmacy Refills  
 glipiZIDE (GLUCOTROL) 10 mg tablet 3 Sig: Take 1 Tab by mouth two (2) times a day. Class: Normal  
 Pharmacy: [address] [place] [address]. Ph #: [phone] Route: Oral  
 insulin glargine (LANTUS SOLOSTAR U-100 INSULIN) 100 unit/mL (3 mL) inpn 3 Sig: INJECT 9 UNITS SUBCUTANEOUSLY ONCE DAILY Class: Normal  
 Pharmacy: [address] [place] [address]. Ph #: [phone] We Performed the Following AMB POC HEMOGLOBIN A1C [46225 CPT(R)]  DIABETES FOOT EXAM [[facility]] MICROALBUMIN, UR, RAND W/ MICROALB/CREAT RATIO [id number] CPT(R)] Follow-up Instructions Return in about 6 months (around 12/7/2018). Patient Instructions 1) Increase your Lantus to 9 units every day (insulin) 2) Continue the Glipizide one pill with breakfast and one pill with dinner. 3) Continue the Janumet 50-1000mg with breakfast and with dinner. Introducing [facility] & HEALTH SERVICES! Nayeli Hernandez introduces [employer] patient portal. Now you can access parts of your medical record, email your doctor's office, and request medication refills online. 1. In your internet browser, go to [email or web address] 2. Click on the First Time User? Click Here link in the Sign In box. You will see the New Member Sign Up page. 3. Enter your GiveGab Access Code exactly as it appears below. You will not need to use this code after youve completed the sign-up process. If you do not sign up before the expiration date, you must request a new code. · [employer] Access Code: [email or web address] Expires: 9/5/2018  2:15 PM 
 
4. Enter the last four digits of your Social Security Number (xxxx) and Date of Birth (mm/dd/yyyy) as indicated and click Submit. You will be taken to the next sign-up page. 5. Create a GiveGab ID. This will be your GiveGab login ID and cannot be changed, so think of one that is secure and easy to remember. 6. Create a GiveGab password. You can change your password at any time. 7. Enter your Password Reset Question and Answer. This can be used at a later time if you forget your password. 8. Enter your e-mail address. You will receive e-mail notification when new information is available in [address] 9. Click Sign Up. You can now view and download portions of your medical record. 10. Click the Download Summary menu link to download a portable copy of your medical information. If you have questions, please visit the Frequently Asked Questions section of the GiveGab website. Remember, GiveGab is NOT to be used for urgent needs. For medical emergencies, dial 911. Now available from your iPhone and Android! Please provide this summary of care documentation to your next provider. If you have any questions after today's visit, please call [phone].

## 2018-06-07 NOTE — PROGRESS NOTES
[address] [address]  [phone]     Subjective:      Dennis Ribera is a 60 y.o. male is here for routine f/u. The patient denies chest pain/ shortness of breath, orthopnea, PND, LE edema, palpitations, syncope, or presyncope. Doing well. Patient Active Problem List    Diagnosis Date Noted    Type 2 diabetes mellitus with complication, with long-term current use of insulin ([address] 11/21/2017    Mixed hyperlipidemia 05/12/2016    Essential hypertension 07/16/2015    S/P coronary artery stent placement 09/21/2012    S/P CABG x 4 05/22/2012    NSTEMI (non-ST elevated myocardial infarction) ([address] 03/24/2012    CAD (coronary artery disease) 03/24/2012    Unstable angina pectoris ([address] 03/24/2012    S/P PTCA (percutaneous transluminal coronary angioplasty) 03/24/2012    Hyperlipidemia with target low density lipoprotein (LDL) cholesterol less than 70 mg/dL 03/24/2012    Tobacco abuse disorder 03/24/2012    Benign essential hypertension 03/24/2012    Cardiomyopathy ([address] 03/24/2012    Chronic systolic heart failure ([address] 03/24/2012    History of noncompliance with medical treatment 03/24/2012    Acute, but ill-defined, cerebrovascular disease 03/24/2012      No primary care provider on file.   Past Medical History:   Diagnosis Date    CAD (coronary artery disease)     COPD     CVA (cerebral infarction) 3/24/2012    Diabetes ([address]     Hypertension     Other ill-defined conditions(799.89)     high cholesterol    Stroke ([address] 3/2012    from previous medical record      Past Surgical History:   Procedure Laterality Date    CARDIAC SURG PROCEDURE UNLIST      cabg, stents    COMB R&L HEART CATH  3/24/2012         HX OTHER SURGICAL      cyst removed from right chest    HX UROLOGICAL      kidney stone removed     No Known Allergies   Family History   Problem Relation Age of Onset    Coronary Artery Disease Father     Heart Disease Mother    Trudy Thomson Coronary Artery Disease Other      Mother and brother in their 52's      Social History     Social History    Marital status:      Spouse name: N/A    Number of children: N/A    Years of education: N/A     Occupational History    Not on file. Social History Main Topics    Smoking status: Current Every Day Smoker     Packs/day: 1.00     Years: 40.00     Types: Cigarettes    Smokeless tobacco: Never Used    Alcohol use Yes      Comment: occasionally    Drug use: No    Sexual activity: Not on file     Other Topics Concern    Not on file     Social History Narrative      Current Outpatient Prescriptions   Medication Sig    BYSTOLIC 10 mg tablet TAKE TWO TABLETS BY MOUTH ONCE DAILY    chlorthalidone (HYGROTEN) 25 mg tablet TAKE ONE TABLET BY MOUTH ONCE DAILY    JANUMET 50-1,000 mg per tablet TAKE ONE TABLET WITH BREAKFAST AND TAKE ONE TABLET WITH DINNER. THIS REPLACES METFORMIN AND JANUVIA    atorvastatin (LIPITOR) 40 mg tablet TAKE ONE TABLET BY MOUTH ONCE DAILY    lisinopril (PRINIVIL, ZESTRIL) 40 mg tablet TAKE ONE TABLET BY MOUTH ONCE DAILY FOR  BLOOD  PRESSURE  AND  KIDNEY  PROTECTION    KLOR-CON M20 20 mEq tablet TAKE ONE TABLET BY MOUTH ONCE DAILY    amLODIPine (NORVASC) 10 mg tablet TAKE ONE TABLET BY MOUTH ONCE DAILY    omeprazole (PRILOSEC) 20 mg capsule Take 20 mg by mouth daily.  glipiZIDE (GLUCOTROL) 10 mg tablet One with breakfast and two with dinner    insulin glargine (LANTUS SOLOSTAR) 100 unit/mL (3 mL) inpn INJECT 8 UNITS SUBCUTANEOUSLY ONCE DAILY    white petrolatum (WHITE PETROLEUM JELLY) topical onitment Apply to the affected area every morning. To uses once the rash is gone.  clotrimazole-betamethasone (LOTRISONE) 1-0.05 % lotion Apply to the effected area every morning    glycerin-mineral oil-lanolin (EUCERIN PLUS) topical cream Apply twice daily as needed    cholecalciferol, vitamin D3, (VITAMIN D3) 2,000 unit tab Take  by mouth.     nebivolol (BYSTOLIC) 20 mg tablet Take 1 tablet by mouth daily.  acetaminophen (TYLENOL) 325 mg tablet Take  by mouth every four (4) hours as needed for Pain.  diphenhydrAMINE (BENADRYL ALLERGY) 25 mg tablet Take 25 mg by mouth every six (6) hours as needed for Sleep.  sildenafil citrate (VIAGRA) 50 mg tablet Take 1 Tab by mouth as needed for Other (ED. ). Do not take nitroglycerine tablets.  famotidine (PEPCID) 20 mg tablet Take 20 mg by mouth daily.  nitroglycerin (NITROSTAT) 0.4 mg SL tablet by SubLINGual route every five (5) minutes as needed.  aspirin 81 mg chewable tablet Take 81 mg by mouth daily. No current facility-administered medications for this visit. Review of Symptoms:  11 systems reviewed, negative other than as stated in the HPI    Physical ExamPhysical Exam:    There were no vitals filed for this visit. There is no height or weight on file to calculate BMI. General PE   Gen:  NAD  Mental Status - Alert. General Appearance - Not in acute distress. Chest and Lung Exam   Inspection: Accessory muscles - No use of accessory muscles in breathing. Auscultation:   Breath sounds: - Normal.   Cardiovascular   Inspection: Jugular vein - Bilateral - Inspection Normal.   Palpation/Percussion:   Apical Impulse: - Normal.   Auscultation: Rhythm - Regular. Heart Sounds - S1 WNL and S2 WNL. No S3 or S4. Murmurs & Other Heart Sounds: Auscultation of the heart reveals - No Murmurs. Peripheral Vascular   Upper Extremity: Inspection - Bilateral - No Cyanotic nailbeds or Digital clubbing. Lower Extremity:   Palpation: Edema - Bilateral - No edema. Abdomen:   Soft, non-tender, bowel sounds are active.   Neuro: A&O times 3, CN and motor grossly WNL    Labs:   Lab Results   Component Value Date/Time    Cholesterol, total 132 07/14/2017 09:38 AM    Cholesterol, total 139 06/26/2014 12:00 AM    Cholesterol, total 183 09/21/2012 04:35 AM    Cholesterol, total 132 03/25/2012 03:30 AM    Cholesterol, total 194 05/29/2009 03:15 AM    HDL Cholesterol 27 (L) 07/14/2017 09:38 AM    HDL Cholesterol 27 (L) 06/26/2014 12:00 AM    HDL Cholesterol 35 09/21/2012 04:35 AM    HDL Cholesterol 29 03/25/2012 03:30 AM    HDL Cholesterol 33 (L) 05/29/2009 03:15 AM    LDL, calculated 66 07/14/2017 09:38 AM    LDL, calculated 75 06/26/2014 12:00 AM    LDL, calculated 121.8 (H) 09/21/2012 04:35 AM    LDL, calculated 78.8 03/25/2012 03:30 AM    LDL, calculated 130.6 (H) 05/29/2009 03:15 AM    Triglyceride 193 (H) 07/14/2017 09:38 AM    Triglyceride 185 (H) 06/26/2014 12:00 AM    Triglyceride 131 09/21/2012 04:35 AM    Triglyceride 121 03/25/2012 03:30 AM    Triglyceride 152 05/29/2009 03:15 AM    CHOL/HDL Ratio 5.2 (H) 09/21/2012 04:35 AM    CHOL/HDL Ratio 4.6 03/25/2012 03:30 AM    CHOL/HDL Ratio 5.9 (H) 05/29/2009 03:15 AM     Lab Results   Component Value Date/Time    CK 85 05/29/2009 03:15 AM     Lab Results   Component Value Date/Time    Sodium 140 07/14/2017 09:41 AM    Potassium 4.6 07/14/2017 09:41 AM    Chloride 98 07/14/2017 09:41 AM    CO2 25 07/14/2017 09:41 AM    Anion gap 7 09/21/2012 04:35 AM    Glucose 190 (H) 07/14/2017 09:41 AM    BUN 11 07/14/2017 09:41 AM    Creatinine 0.88 07/14/2017 09:41 AM    BUN/Creatinine ratio 13 07/14/2017 09:41 AM    GFR est  07/14/2017 09:41 AM    GFR est non-AA 92 07/14/2017 09:41 AM    Calcium 9.9 07/14/2017 09:41 AM    Bilirubin, total 0.6 07/14/2017 09:41 AM    AST (SGOT) 25 07/14/2017 09:41 AM    Alk. phosphatase 73 07/14/2017 09:41 AM    Protein, total 7.2 07/14/2017 09:41 AM    Albumin 4.4 07/14/2017 09:41 AM    Globulin 2.4 05/23/2012 04:05 AM    A-G Ratio 1.6 07/14/2017 09:41 AM    ALT (SGPT) 41 07/14/2017 09:41 AM       EKG:  NSR      Assessment:     Assessment:      1. Coronary artery disease involving native coronary artery of native heart without angina pectoris    2. S/P CABG x 4    3. S/P coronary artery stent placement    4. Benign essential hypertension    5. Hyperlipidemia with target low density lipoprotein (LDL) cholesterol less than 70 mg/dL    6. Tobacco abuse disorder    7. Type 2 diabetes mellitus with complication, with long-term current use of insulin (HCC)        No orders of the defined types were placed in this encounter. Plan:     Patient presents for follow up, feeling well and stable from cardiac standpoint.      1.  Atherosclerotic heart disease: Clinically stable continue current therapy long-term aspirin. 2.  HFrEF: EF 40-45,  Mild MR per Echo 05/16. Compensated. On BB, Ace-I  3. Hypertension: Controlled  4.  Hyperlipidemia: LDL on target 7/17. On lipitor 40mg. Will check labs  5.  Tobacco abuse: Not interested on quitting  6. DM: On Insulin therapy and oral agent. Developed leg cramps when Dr Navi Lerma titrated up his Glipizide. Resolved with Potassium supplement    Continue current care and f/u in 6 months. Mary Montejo NP       [facility]    6/11/2018         Patient seen, examined by me personally. Plan discussed as detailed. Agree with note as outlined by  NP. I confirm findings in history and physical exam. No additional findings noted. Agree with plan as outlined above.      Albin Orellana MD

## 2018-06-07 NOTE — PROGRESS NOTES
1. Have you been to the ER, urgent care clinic since your last visit? Hospitalized since your last visit? NO\    2. Have you seen or consulted any other health care providers outside of the [address] [place] [address] since your last visit? Include any pap smears or colon screening. NO    NO CARDIAC C/O. PT HAS BEEN TAKING POTASSIUM 99 MG ALSO FOR LEG CRAMPS.

## 2018-06-07 NOTE — PATIENT INSTRUCTIONS
1) Increase your Lantus to 9 units every day (insulin)  2) Continue the Glipizide one pill with breakfast and one pill with dinner. 3) Continue the Janumet 50-1000mg with breakfast and with dinner.

## 2018-06-07 NOTE — PROGRESS NOTES
Chief Complaint   Patient presents with    Diabetes   Records since his last visit reviewed. History of Present Illness: Hetal Rodgers is a 60 y.o. male here for follow up of diabetes. At his last visit in November 2017 his A1C was 8.6% on Janumet 50/1000mg BID, glipizide 10mg with breakfast and 10mg with dinner and Lantus 8 units daily. He was not having any further episodes of hypoglycemia, which he had on the Lantus 10 units. Pt instructed to take Lantus 8 units daily, Glipizide 10mg with breakfast and 20mg with dinner and Metformin/Januvia 50/1000mg BID. His A1C today was 8.8%    Pt is taking Janumet 50/1000mg BID, Lantus 8 units daily and Glipizide 10mg with breakfast and 10mg with dinner. He notes that the 20mg of Glipizide with dinner was causing leg cramping. He notes when he cut back to the 10mg BID his cramping improved. He has not had any low blood sugars  He is not checking his blood sugars. He denies issues of CP, SOB, polyuria and polydipsia. Pt is eating 3 meals daily. His largest meal is Dinner. For breakfast every day. Today he had two sausage and egg sandwiches and regular soda. He will have lunch around 1PM, yesterday he had a ham sandwich and coffee   Last night for dinner he had a sub sandwich. He is followed by Dr. Yuliana Wong of cardiology. He saw Dr. Yuliana Wong today Alejandra Elkins said everything was excellent\". He stays busy chopping wood and yard work and work around [employer]. 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 No history of retinopathy, neuropathy, but has some nephropathy. Last eye exam was April 2018, no retinopathy, will request these records. Current Outpatient Prescriptions   Medication Sig    OTHER Take 1 Tab by mouth daily. HYLANDS LEG CRAMPS    potassium 99 mg tablet Take 99 mg by mouth two (2) times a day.  glipiZIDE (GLUCOTROL) 10 mg tablet Take 1 Tab by mouth two (2) times a day.  insulin glargine (LANTUS SOLOSTAR U-100 INSULIN) 100 unit/mL (3 mL) inpn INJECT 9 UNITS SUBCUTANEOUSLY ONCE DAILY    BYSTOLIC 10 mg tablet TAKE TWO TABLETS BY MOUTH ONCE DAILY    chlorthalidone (HYGROTEN) 25 mg tablet TAKE ONE TABLET BY MOUTH ONCE DAILY    JANUMET 50-1,000 mg per tablet TAKE ONE TABLET WITH BREAKFAST AND TAKE ONE TABLET WITH DINNER. THIS REPLACES METFORMIN AND JANUVIA    atorvastatin (LIPITOR) 40 mg tablet TAKE ONE TABLET BY MOUTH ONCE DAILY    lisinopril (PRINIVIL, ZESTRIL) 40 mg tablet TAKE ONE TABLET BY MOUTH ONCE DAILY FOR  BLOOD  PRESSURE  AND  KIDNEY  PROTECTION    KLOR-CON M20 20 mEq tablet TAKE ONE TABLET BY MOUTH ONCE DAILY    amLODIPine (NORVASC) 10 mg tablet TAKE ONE TABLET BY MOUTH ONCE DAILY    white petrolatum (WHITE PETROLEUM JELLY) topical onitment Apply to the affected area every morning. To uses once the rash is gone.  clotrimazole-betamethasone (LOTRISONE) 1-0.05 % lotion Apply to the effected area every morning    glycerin-mineral oil-lanolin (EUCERIN PLUS) topical cream Apply twice daily as needed    cholecalciferol, vitamin D3, (VITAMIN D3) 2,000 unit tab Take  by mouth.  acetaminophen (TYLENOL) 325 mg tablet Take  by mouth every four (4) hours as needed for Pain.  diphenhydrAMINE (BENADRYL ALLERGY) 25 mg tablet Take 25 mg by mouth every six (6) hours as needed for Sleep.  famotidine (PEPCID) 20 mg tablet Take 20 mg by mouth daily.  nitroglycerin (NITROSTAT) 0.4 mg SL tablet by SubLINGual route every five (5) minutes as needed.  aspirin 81 mg chewable tablet Take 81 mg by mouth daily. No current facility-administered medications for this visit.       No Known Allergies  Review of Systems:  - Eyes: no blurry vision or double vision  - Cardiovascular: no chest pain  - Respiratory: no shortness of breath  - Musculoskeletal: no myalgias  - Neurological: no numbness/tingling in extremities    Physical Examination:  Blood pressure 111/76, pulse 71, height 5' 9\" (1.753 m), weight 206 lb 9.6 oz (93.7 kg). - General: pleasant, no distress, good eye contact   - Neck: no carotid bruits  - Cardiovascular: regular, normal rate, nl s1 and s2, no m/r/g, 2+ DP pulses   - Respiratory: clear bilaterally  - Integumentary: no edema, no foot ulcers, sensation to monofilament and vibration intact bilaterally  - Psychiatric: normal mood and affect    Diabetic foot exam:     Left Foot:   Visual Exam: callous - present   Pulse DP: 2+ (normal)   Filament test: normal sensation    Vibratory sensation: normal      Right Foot:   Visual Exam: callous - present   Pulse DP: 2+ (normal)   Filament test: normal sensation    Vibratory sensation: normal        Data Reviewed:   His A1C today was 8.8%    Assessment/Plan:   1) DM > His A1C today was 8.8%. Pt was having low BGs on the Lantus 10 units, but since being on the 8 units a day of Lantus he has not had anymore low BGs. His A1C was 8.8% so will increase the Lantus to 9 units daily and continue the Glipizide 10mg with breakfast and 20mg with dinner and Metformin/Januvia 50/1000mg BID. His Goal A1C is under 8%. Pt to check his blood sugars twice per day. 2) HTN > His BP is at goal and is on an ACEI for renal protection    3) HLD > Pt is tolerating Lipitor 40mg daily, which is a high intensity statin regimen. His LDL and TC was at goal in July 2017. Pt voices understanding and agreement with the plan. RTC 6 months    Follow-up Disposition:  Return in about 6 months (around 12/7/2018).     Copy sent to:  Dr. Percy Loaiza

## 2018-06-08 LAB
ALBUMIN/CREAT UR: 122.4 MG/G CREAT (ref 0–30)
CREAT UR-MCNC: 171.1 MG/DL
MICROALBUMIN UR-MCNC: 209.4 UG/ML

## 2018-06-11 PROBLEM — E11.21 TYPE 2 DIABETES WITH NEPHROPATHY (HCC): Status: ACTIVE | Noted: 2018-06-11

## 2018-08-01 DIAGNOSIS — Z79.4 TYPE 2 DIABETES MELLITUS WITH DIABETIC NEPHROPATHY, WITH LONG-TERM CURRENT USE OF INSULIN (HCC): ICD-10-CM

## 2018-08-01 DIAGNOSIS — I10 ESSENTIAL HYPERTENSION: ICD-10-CM

## 2018-08-01 DIAGNOSIS — E11.21 TYPE 2 DIABETES MELLITUS WITH DIABETIC NEPHROPATHY, WITH LONG-TERM CURRENT USE OF INSULIN (HCC): ICD-10-CM

## 2018-08-01 RX ORDER — LISINOPRIL 40 MG/1
TABLET ORAL
Qty: 90 TAB | Refills: 0 | Status: SHIPPED | OUTPATIENT
Start: 2018-08-01 | End: 2018-11-03 | Stop reason: SDUPTHER

## 2018-08-01 RX ORDER — INSULIN GLARGINE 100 [IU]/ML
INJECTION, SOLUTION SUBCUTANEOUS
Qty: 15 ML | Refills: 0 | Status: SHIPPED | OUTPATIENT
Start: 2018-08-01 | End: 2018-11-28 | Stop reason: SDUPTHER

## 2018-10-08 RX ORDER — SITAGLIPTIN AND METFORMIN HYDROCHLORIDE 50; 1000 MG/1; MG/1
TABLET, FILM COATED ORAL
Qty: 60 TAB | Refills: 6 | Status: SHIPPED | OUTPATIENT
Start: 2018-10-08 | End: 2018-11-28 | Stop reason: SDUPTHER

## 2018-11-03 DIAGNOSIS — I10 ESSENTIAL HYPERTENSION: ICD-10-CM

## 2018-11-05 RX ORDER — LISINOPRIL 40 MG/1
TABLET ORAL
Qty: 90 TAB | Refills: 0 | Status: SHIPPED | OUTPATIENT
Start: 2018-11-05 | End: 2019-02-05 | Stop reason: SDUPTHER

## 2018-11-07 RX ORDER — CHLORTHALIDONE 25 MG/1
TABLET ORAL
Qty: 30 TAB | Refills: 6 | Status: SHIPPED | OUTPATIENT
Start: 2018-11-07 | End: 2019-06-19 | Stop reason: SDUPTHER

## 2018-11-28 ENCOUNTER — OFFICE VISIT (OUTPATIENT)
Dept: ENDOCRINOLOGY | Age: 63
End: 2018-11-28

## 2018-11-28 ENCOUNTER — OFFICE VISIT (OUTPATIENT)
Dept: CARDIOLOGY CLINIC | Age: 63
End: 2018-11-28

## 2018-11-28 VITALS
BODY MASS INDEX: 31.15 KG/M2 | DIASTOLIC BLOOD PRESSURE: 84 MMHG | RESPIRATION RATE: 18 BRPM | OXYGEN SATURATION: 97 % | SYSTOLIC BLOOD PRESSURE: 128 MMHG | HEIGHT: 69 IN | WEIGHT: 210.3 LBS | HEART RATE: 71 BPM

## 2018-11-28 VITALS
HEART RATE: 71 BPM | HEIGHT: 69 IN | DIASTOLIC BLOOD PRESSURE: 80 MMHG | WEIGHT: 207.2 LBS | BODY MASS INDEX: 30.69 KG/M2 | SYSTOLIC BLOOD PRESSURE: 129 MMHG

## 2018-11-28 DIAGNOSIS — Z72.0 TOBACCO ABUSE DISORDER: ICD-10-CM

## 2018-11-28 DIAGNOSIS — I10 BENIGN ESSENTIAL HYPERTENSION: ICD-10-CM

## 2018-11-28 DIAGNOSIS — E78.5 HYPERLIPIDEMIA WITH TARGET LOW DENSITY LIPOPROTEIN (LDL) CHOLESTEROL LESS THAN 70 MG/DL: ICD-10-CM

## 2018-11-28 DIAGNOSIS — Z95.1 S/P CABG X 4: ICD-10-CM

## 2018-11-28 DIAGNOSIS — E11.8 TYPE 2 DIABETES MELLITUS WITH COMPLICATION, WITH LONG-TERM CURRENT USE OF INSULIN (HCC): ICD-10-CM

## 2018-11-28 DIAGNOSIS — Z79.4 TYPE 2 DIABETES MELLITUS WITH COMPLICATION, WITH LONG-TERM CURRENT USE OF INSULIN (HCC): ICD-10-CM

## 2018-11-28 DIAGNOSIS — I25.10 CORONARY ARTERY DISEASE INVOLVING NATIVE CORONARY ARTERY OF NATIVE HEART WITHOUT ANGINA PECTORIS: Primary | ICD-10-CM

## 2018-11-28 DIAGNOSIS — E11.21 TYPE 2 DIABETES MELLITUS WITH DIABETIC NEPHROPATHY, WITH LONG-TERM CURRENT USE OF INSULIN (HCC): Primary | ICD-10-CM

## 2018-11-28 DIAGNOSIS — Z79.4 TYPE 2 DIABETES MELLITUS WITH DIABETIC NEPHROPATHY, WITH LONG-TERM CURRENT USE OF INSULIN (HCC): Primary | ICD-10-CM

## 2018-11-28 DIAGNOSIS — I10 ESSENTIAL HYPERTENSION: ICD-10-CM

## 2018-11-28 LAB — HBA1C MFR BLD HPLC: 8.1 %

## 2018-11-28 RX ORDER — GLIPIZIDE 10 MG/1
TABLET ORAL
Qty: 90 TAB | Refills: 11 | Status: SHIPPED | OUTPATIENT
Start: 2018-11-28 | End: 2019-06-05 | Stop reason: ALTCHOICE

## 2018-11-28 RX ORDER — INSULIN GLARGINE 100 [IU]/ML
INJECTION, SOLUTION SUBCUTANEOUS
Qty: 15 ML | Refills: 11 | Status: SHIPPED | OUTPATIENT
Start: 2018-11-28 | End: 2020-01-28

## 2018-11-28 RX ORDER — CLOTRIMAZOLE AND BETAMETHASONE DIPROPIONATE 10; .5 MG/ML; MG/ML
LOTION TOPICAL
Qty: 30 ML | Refills: 1 | Status: SHIPPED | OUTPATIENT
Start: 2018-11-28 | End: 2020-06-26 | Stop reason: SDUPTHER

## 2018-11-28 RX ORDER — POTASSIUM CHLORIDE 750 MG/1
10 TABLET, EXTENDED RELEASE ORAL DAILY
Qty: 30 TAB | Refills: 12 | Status: SHIPPED | OUTPATIENT
Start: 2018-11-28 | End: 2019-12-26

## 2018-11-28 NOTE — PROGRESS NOTES
Chief Complaint Patient presents with  Diabetes PCP and pharmacy confirmed Records since his last visit reviewed. History of Present Illness: Ashley Caban is a 58 y.o. male here for follow up of diabetes. At his last visit in June 2018 his A1C was 8.8% on Janumet 50/1000mg BID, glipizide 10mg with breakfast and 10mg with dinner and Lantus 8 units daily. He was not having any further episodes of hypoglycemia, which he had on the Lantus 10 units. Pt instructed to take Lantus 9 units daily, Glipizide 10mg with breakfast and 20mg with dinner and Metformin/Januvia 50/1000mg BID. His A1C today was 8.0% Pt is taking Janumet 50/1000mg BID, Lantus 9 units daily and Glipizide 10mg with breakfast and 10mg with dinner. He notes that the 20mg of Glipizide with dinner was causing leg cramping. He notes when he cut back to the 10mg BID his cramping improved. He has not had any low blood sugars He is not checking his blood sugars. He denies issues of CP, SOB, polyuria and polydipsia. Pt is eating 3 meals daily. His largest meal is Dinner. For breakfast every day. Today he had two sausage and egg sandwiches and coffee (splenda). He will have lunch around 1PM, yesterday he had a roast beef sandwich and coffee Last night for dinner he had sausages on toast with orange juice. He is followed by Dr. Charmayne Roll of cardiology. He saw Dr. Charmayne Roll today Dany Glynn said everything was excellent\". He stays busy chopping wood and yard work and work around [employer]. 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 No history of retinopathy, neuropathy, but has some nephropathy. Last eye exam was April 2018, no retinopathy. Current Outpatient Medications Medication Sig  clotrimazole-betamethasone (LOTRISONE) 1-0.05 % lotion Apply to the effected area every morning  insulin glargine (LANTUS SOLOSTAR U-100 INSULIN) 100 unit/mL (3 mL) inpn INJECT 9 UNITS SUB-Q ONCE DAILY  SITagliptin-metFORMIN (JANUMET) 50-1,000 mg per tablet TAKE 1 TABLET BY MOUTH ONCE DAILY WITH BREAKFAST AND  ONE  TABLET  WITH  DINNER  (THIS  REPLACES  [facility])  glipiZIDE (GLUCOTROL) 10 mg tablet TAKE ONE TABLET BY MOUTH WITH BREAKFAST AND TAKE TWO TABLETS BY MOUTH WITH DINNER  chlorthalidone (HYGROTEN) 25 mg tablet TAKE 1 TABLET BY MOUTH ONCE DAILY  lisinopril (PRINIVIL, ZESTRIL) 40 mg tablet TAKE 1 TABLET BY MOUTH ONCE DAILY FOR BLOOD PRESSURE AND  KIDNEY  PROTECTION  
 OTHER Take 1 Tab by mouth daily. HYLANDS LEG CRAMPS  potassium 99 mg tablet Take 99 mg by mouth daily.  BYSTOLIC 10 mg tablet TAKE TWO TABLETS BY MOUTH ONCE DAILY  atorvastatin (LIPITOR) 40 mg tablet TAKE ONE TABLET BY MOUTH ONCE DAILY  KLOR-CON M20 20 mEq tablet TAKE ONE TABLET BY MOUTH ONCE DAILY  amLODIPine (NORVASC) 10 mg tablet TAKE ONE TABLET BY MOUTH ONCE DAILY  white petrolatum (WHITE PETROLEUM JELLY) topical onitment Apply to the affected area every morning. To uses once the rash is gone.  cholecalciferol, vitamin D3, (VITAMIN D3) 2,000 unit tab Take  by mouth.  acetaminophen (TYLENOL) 325 mg tablet Take  by mouth every four (4) hours as needed for Pain.  famotidine (PEPCID) 20 mg tablet Take 20 mg by mouth daily.  nitroglycerin (NITROSTAT) 0.4 mg SL tablet by SubLINGual route every five (5) minutes as needed.  aspirin 81 mg chewable tablet Take 81 mg by mouth daily.  glycerin-mineral oil-lanolin (EUCERIN PLUS) topical cream Apply twice daily as needed  diphenhydrAMINE (BENADRYL ALLERGY) 25 mg tablet Take 25 mg by mouth every six (6) hours as needed for Sleep. No current facility-administered medications for this visit. No Known Allergies Review of Systems: - Eyes: no blurry vision or double vision - Cardiovascular: no chest pain - Respiratory: no shortness of breath - Musculoskeletal: no myalgias - Neurological: no numbness/tingling in extremities Physical Examination: 
Blood pressure 129/80, pulse 71, height 5' 9\" (1.753 m), weight 207 lb 3.2 oz (94 kg). - General: pleasant, no distress, good eye contact  
- Neck: no carotid bruits - Cardiovascular: regular, normal rate, nl s1 and s2, no m/r/g, 2+ DP pulses - Respiratory: clear bilaterally - Integumentary: no edema, no foot ulcers,  
- Psychiatric: normal mood and affect Diabetic foot exam:  
 
Left Foot: 
 Visual Exam: callous - present Pulse DP: 2+ (normal) Filament test: normal sensation Vibratory sensation: normal 
   
Right Foot: 
 Visual Exam: callous - present Pulse DP: 2+ (normal) Filament test: normal sensation Vibratory sensation: normal 
 
 
 
Data Reviewed:  
His A1C today was 8.0% Assessment/Plan:  
1) DM > His A1C today was 8.0%. Will continue the 9 units a day of Lantus, the Glipizide 10mg with breakfast and 20mg with dinner and Metformin/Januvia 50/1000mg BID. His Goal A1C is under 8%. Pt to check his blood sugars twice per day. 2) HTN > His BP is at goal and is on an ACEI for renal protection Pt voices understanding and agreement with the plan. RTC 6 months Follow-up Disposition: 
Return in about 6 months (around 5/28/2019). Copy sent to: 
Dr. Mekhi James

## 2018-11-28 NOTE — PROGRESS NOTES
NAME:  Kristina Gill   :   1955   MRN:   [id number]   PCP:  No primary care provider on file.           Subjective: The patient is a 58y.o. year old male  who returns for a routine follow-up. Since the last visit, patient reports no change in exercise tolerance, chest pain, edema, medication intolerance, palpitations, shortness of breath, PND/orthopnea wheezing, sputum, syncope, dizziness or light headedness. Doing well. Past Medical History:   Diagnosis Date    CAD (coronary artery disease)     COPD     CVA (cerebral infarction) 3/24/2012    Diabetes ([facility] [address] [facility]     Hypertension     Other ill-defined conditions(799.89)     high cholesterol    [facility]/2012    from previous medical record        ICD-10-CM ICD-9-CM    1. Coronary artery disease involving native coronary artery of native heart without angina pectoris I25.10 414.01    2. Essential hypertension I10 401.9 AMB POC EKG ROUTINE W/ 12 LEADS, INTER & REP   3. Hyperlipidemia with target low density lipoprotein (LDL) cholesterol less than 70 mg/dL E78.5 272.4    4. Tobacco abuse disorder Z72.0 305.1    5. Type 2 diabetes mellitus with complication, with long-term current use of insulin (HCC) E11.8 250.90     Z79.4 V58.67    6. S/P CABG x 4 Z95.1 V45.81       Social History     Tobacco Use    Smoking status: Current Every Day Smoker     Packs/day: 1.00     Years: 40.00     Pack years: 40.00     Types: Cigarettes    Smokeless tobacco: Never Used   Substance Use Topics    Alcohol use: Yes     Comment: occasionally      Family History   Problem Relation Age of Onset    Coronary Artery Disease Father     Heart Disease Mother     Coronary Artery Disease Other         Mother and brother in their 52's        Review of Systems  General: Pt denies excessive weight gain or loss. Pt is able to conduct ADL's  HEENT: Denies blurred vision, headaches, epistaxis and difficulty swallowing.   Respiratory: Denies shortness of breath, PERAZA, wheezing or stridor. Cardiovascular: Denies precordial pain, palpitations, edema or PND  Gastrointestinal: Denies poor appetite, indigestion, abdominal pain or blood in stool  Musculoskeletal: Denies pain or swelling from muscles or joints  Neurologic: Denies tremor, paresthesias, or sensory motor disturbance  Skin: Denies rash, itching or texture change. Objective:       Vitals:    11/28/18 1013 11/28/18 1041   BP: 138/84 128/84   Pulse: 71    Resp: 18    SpO2: 97%    Weight: 210 lb 4.8 oz (95.4 kg)    Height: 5' 9\" (1.753 m)     Body mass index is 31.06 kg/m². General PE  Mental Status - Alert. General Appearance - Not in acute distress. Chest and Lung Exam   Inspection: Accessory muscles - No use of accessory muscles in breathing. Auscultation:   Breath sounds: - Normal.    Cardiovascular   Inspection: Jugular vein - Bilateral - Inspection Normal.  Palpation/Percussion:   Apical Impulse: - Normal.  Auscultation: Rhythm - Regular. Heart Sounds - S1 WNL and S2 WNL. No S3 or S4. Murmurs & Other Heart Sounds: Auscultation of the heart reveals - No Murmurs. Peripheral Vascular   Upper Extremity: Inspection - Bilateral - No Cyanotic nailbeds or Digital clubbing. Lower Extremity:   Palpation: Edema - Bilateral - No edema. Data Review:     EKG -EKG: normal EKG, normal sinus rhythm, unchanged from previous tracings. Medications reviewed  Current Outpatient Medications   Medication Sig    potassium chloride (KLOR-CON) 10 mEq tablet Take 1 Tab by mouth daily.  chlorthalidone (HYGROTEN) 25 mg tablet TAKE 1 TABLET BY MOUTH ONCE DAILY    lisinopril (PRINIVIL, ZESTRIL) 40 mg tablet TAKE 1 TABLET BY MOUTH ONCE DAILY FOR BLOOD PRESSURE AND  KIDNEY  PROTECTION    OTHER Take 1 Tab by mouth daily. HYLANDS LEG CRAMPS    potassium 99 mg tablet Take 99 mg by mouth daily.     BYSTOLIC 10 mg tablet TAKE TWO TABLETS BY MOUTH ONCE DAILY    atorvastatin (LIPITOR) 40 mg tablet TAKE ONE TABLET BY MOUTH ONCE DAILY    amLODIPine (NORVASC) 10 mg tablet TAKE ONE TABLET BY MOUTH ONCE DAILY    white petrolatum (WHITE PETROLEUM JELLY) topical onitment Apply to the affected area every morning. To uses once the rash is gone.  glycerin-mineral oil-lanolin (EUCERIN PLUS) topical cream Apply twice daily as needed    cholecalciferol, vitamin D3, (VITAMIN D3) 2,000 unit tab Take  by mouth.  acetaminophen (TYLENOL) 325 mg tablet Take  by mouth every four (4) hours as needed for Pain.  diphenhydrAMINE (BENADRYL ALLERGY) 25 mg tablet Take 25 mg by mouth every six (6) hours as needed for Sleep.  famotidine (PEPCID) 20 mg tablet Take 20 mg by mouth daily.  nitroglycerin (NITROSTAT) 0.4 mg SL tablet by SubLINGual route every five (5) minutes as needed.  aspirin 81 mg chewable tablet Take 81 mg by mouth daily.  clotrimazole-betamethasone (LOTRISONE) 1-0.05 % lotion Apply to the effected area every morning    insulin glargine (LANTUS SOLOSTAR U-100 INSULIN) 100 unit/mL (3 mL) inpn INJECT 9 UNITS SUB-Q ONCE DAILY    SITagliptin-metFORMIN (JANUMET) 50-1,000 mg per tablet TAKE 1 TABLET BY MOUTH ONCE DAILY WITH BREAKFAST AND  ONE  TABLET  WITH  DINNER  (THIS  REPLACES  METFORMIN  AND  JANUVIA)    glipiZIDE (GLUCOTROL) 10 mg tablet TAKE ONE TABLET BY MOUTH WITH BREAKFAST AND TAKE TWO TABLETS BY MOUTH WITH DINNER     No current facility-administered medications for this visit. Assessment:       ICD-10-CM ICD-9-CM    1. Coronary artery disease involving native coronary artery of native heart without angina pectoris I25.10 414.01    2. Essential hypertension I10 401.9 AMB POC EKG ROUTINE W/ 12 LEADS, INTER & REP   3. Hyperlipidemia with target low density lipoprotein (LDL) cholesterol less than 70 mg/dL E78.5 272.4    4. Tobacco abuse disorder Z72.0 305.1    5.  Type 2 diabetes mellitus with complication, with long-term current use of insulin (HCC) E11.8 250.90     Z79.4 V58.67 6. S/P CABG x 4 Z95.1 V45.81         Plan:     Patient presents doing well and stable from cardiac standpoint. He is taking OTC potassium supplements along with regular KCL. States that it helps with his leg cramps. Will decrease KCL to 10. Continue supplement. Refer to derm for fungal infection in inguinal area. Continue current care and follow up in 6 months.     Angélica Petty MD

## 2018-11-28 NOTE — PROGRESS NOTES
1. Have you been to the ER, urgent care clinic since your last visit? Hospitalized since your last visit? No.    2. Have you seen or consulted any other health care providers outside of the [address] [place] [address] since your last visit? Include any pap smears or colon screening. Seen by PCP. Seen by Endo.       Chief Complaint   Patient presents with    Other     6 MONTH- cramping in legs at night-pt denies any cardiac symptoms

## 2018-12-06 RX ORDER — NEBIVOLOL HYDROCHLORIDE 10 MG/1
TABLET ORAL
Qty: 60 TAB | Refills: 6 | Status: SHIPPED | OUTPATIENT
Start: 2018-12-06 | End: 2019-07-22 | Stop reason: SDUPTHER

## 2019-02-06 RX ORDER — AMLODIPINE BESYLATE 10 MG/1
TABLET ORAL
Qty: 30 TAB | Refills: 12 | Status: SHIPPED | OUTPATIENT
Start: 2019-02-06 | End: 2020-02-26

## 2019-02-06 RX ORDER — ATORVASTATIN CALCIUM 40 MG/1
TABLET, FILM COATED ORAL
Qty: 30 TAB | Refills: 12 | Status: SHIPPED | OUTPATIENT
Start: 2019-02-06 | End: 2020-02-26

## 2019-04-12 ENCOUNTER — HOSPITAL ENCOUNTER (EMERGENCY)
Age: 64
Discharge: HOME | End: 2019-04-12
Attending: EMERGENCY MEDICINE
Payer: MEDICARE

## 2019-04-12 VITALS
SYSTOLIC BLOOD PRESSURE: 132 MMHG | OXYGEN SATURATION: 95 % | TEMPERATURE: 98.7 F | BODY MASS INDEX: 30.6 KG/M2 | HEART RATE: 78 BPM | RESPIRATION RATE: 16 BRPM | DIASTOLIC BLOOD PRESSURE: 86 MMHG | HEIGHT: 69 IN

## 2019-04-12 DIAGNOSIS — L02.214 ABSCESS OF LEFT GROIN: Primary | ICD-10-CM

## 2019-04-12 PROCEDURE — 99282 EMERGENCY DEPT VISIT SF MDM: CPT

## 2019-04-12 PROCEDURE — 77030019895 HC PCKNG STRP IODO -A

## 2019-04-12 PROCEDURE — 75810000289 HC I&D ABSCESS SIMP/COMP/MULT

## 2019-04-12 RX ORDER — LIDOCAINE HYDROCHLORIDE AND EPINEPHRINE 20; 10 MG/ML; UG/ML
10 INJECTION, SOLUTION INFILTRATION; PERINEURAL
Status: DISCONTINUED | OUTPATIENT
Start: 2019-04-12 | End: 2019-04-12

## 2019-04-12 RX ORDER — DOXYCYCLINE 100 MG/1
100 CAPSULE ORAL 2 TIMES DAILY
Qty: 20 CAP | Refills: 0 | Status: SHIPPED | OUTPATIENT
Start: 2019-04-12 | End: 2019-04-22

## 2019-04-12 NOTE — ED NOTES
Pt discharged by LISA Ro. Pt provided with discharge instructions Rx and instructions on follow up care. Pt out of ED in stable condition accompanied by self.

## 2019-04-12 NOTE — ED PROVIDER NOTES
EMERGENCY DEPARTMENT HISTORY AND PHYSICAL EXAM 
 
 
Date: 4/12/2019 Patient Name: Anita Kaminski History of Presenting Illness Chief Complaint Patient presents with  
 Skin Problem  
  lump to left groin area. Pt reports he has a cath done in this area a few years ago and pt is concerned the artery is ruptured. Pt reports when he squeezed the lump pus and blood came out. Pt ambulatory to triage and VSS at this time. History Provided By: Patient HPI: Anita Kaminski, 58 y.o. male presents ambulatory to the ED with cc of several days of moderately severe aching, worsening abscess of the left groin which is worse with palpation and not much better using an OTC salve. He denies fever. He tells me he is concerned because he has had a number of cardiac catheterizations that have gone up to the groin and he is worried that his Jose Sleek has busted \". But for the pain in the groin he tells me he feels pretty good. There are no other complaints, changes, or physical findings at this time. PCP: UNKNOWN Current Facility-Administered Medications Medication Dose Route Frequency Provider Last Rate Last Dose  lidocaine-EPINEPHrine (PF) (XYLOCAINE) 1 %-1:200,000 injection 100 mg  10 mL IntraDERMal NOW LISA Seals Current Outpatient Medications Medication Sig Dispense Refill  doxycycline (MONODOX) 100 mg capsule Take 1 Cap by mouth two (2) times a day for 10 days. 20 Cap 0  
 amLODIPine (NORVASC) 10 mg tablet TAKE ONE TABLET BY MOUTH ONCE DAILY 30 Tab 12  
 lisinopril (PRINIVIL, ZESTRIL) 40 mg tablet TAKE 1 TABLET BY MOUTH ONCE DAILY FOR BLOOD PRESSURE AND  KIDNEY  PROTECTION 90 Tab 3  
 BYSTOLIC 10 mg tablet TAKE 2 TABLETS BY MOUTH ONCE DAILY 60 Tab 6  clotrimazole-betamethasone (LOTRISONE) 1-0.05 % lotion Apply to the effected area every morning 30 mL 1  
 insulin glargine (LANTUS SOLOSTAR U-100 INSULIN) 100 unit/mL (3 mL) inpn INJECT 9 UNITS SUB-Q ONCE DAILY 15 mL 11  
 SITagliptin-metFORMIN (JANUMET) 50-1,000 mg per tablet TAKE 1 TABLET BY MOUTH ONCE DAILY WITH BREAKFAST AND  ONE  TABLET  WITH  DINNER  (THIS  REPLACES  METFORMIN  AND  JANUVIA) 60 Tab 11  
 glipiZIDE (GLUCOTROL) 10 mg tablet TAKE ONE TABLET BY MOUTH WITH BREAKFAST AND TAKE TWO TABLETS BY MOUTH WITH DINNER 90 Tab 11  
 potassium chloride (KLOR-CON) 10 mEq tablet Take 1 Tab by mouth daily. 30 Tab 12  
 chlorthalidone (HYGROTEN) 25 mg tablet TAKE 1 TABLET BY MOUTH ONCE DAILY 30 Tab 6  potassium 99 mg tablet Take 99 mg by mouth daily.  white petrolatum (WHITE PETROLEUM JELLY) topical onitment Apply to the affected area every morning. To uses once the rash is gone. 2270 g 3  
 glycerin-mineral oil-lanolin (EUCERIN PLUS) topical cream Apply twice daily as needed 1 Tube 3  cholecalciferol, vitamin D3, (VITAMIN D3) 2,000 unit tab Take  by mouth.  acetaminophen (TYLENOL) 325 mg tablet Take  by mouth every four (4) hours as needed for Pain.  diphenhydrAMINE (BENADRYL ALLERGY) 25 mg tablet Take 25 mg by mouth every six (6) hours as needed for Sleep.  famotidine (PEPCID) 20 mg tablet Take 20 mg by mouth daily.  aspirin 81 mg chewable tablet Take 81 mg by mouth daily.  atorvastatin (LIPITOR) 40 mg tablet TAKE ONE TABLET BY MOUTH ONCE DAILY 30 Tab 12  
 OTHER Take 1 Tab by mouth daily. HYLANDS LEG CRAMPS  nitroglycerin (NITROSTAT) 0.4 mg SL tablet by SubLINGual route every five (5) minutes as needed. Past History Past Medical History: 
Past Medical History:  
Diagnosis Date  CAD (coronary artery disease)  COPD  CVA (cerebral infarction) 3/24/2012  Diabetes ([facility] [address]  Hypertension  Other ill-defined conditions(799.89)   
 high cholesterol  Stroke [facility]) 3/2012  
 from previous medical record Past Surgical History: 
Past Surgical History:  
Procedure Laterality Date  CARDIAC SURG PROCEDURE UNLIST    
 cabg, stents  COMB R&L HEART CATH  3/24/2012  HX OTHER SURGICAL    
 cyst removed from right chest  
 HX UROLOGICAL    
 kidney stone removed Family History: 
Family History Problem Relation Age of Onset  Coronary Artery Disease Father  Heart Disease Mother  Coronary Artery Disease Other Mother and brother in their 52's Social History: 
Social History Tobacco Use  Smoking status: Current Every Day Smoker Packs/day: 1.00 Years: 40.00 Pack years: 40.00 Types: Cigarettes  Smokeless tobacco: Never Used Substance Use Topics  Alcohol use: Yes Comment: occasionally  Drug use: No  
 
 
Allergies: 
No Known Allergies Review of Systems Review of Systems Constitutional: Negative for fatigue and fever. HENT: Negative for congestion, ear pain and rhinorrhea. Eyes: Negative for pain and redness. Respiratory: Negative for cough and wheezing. Cardiovascular: Negative for chest pain and palpitations. Gastrointestinal: Negative for abdominal pain, nausea and vomiting. Genitourinary: Negative for dysuria, frequency and urgency. Musculoskeletal: Negative for back pain, neck pain and neck stiffness. Skin: Negative for rash and wound. Abscess of left groin Neurological: Negative for weakness, light-headedness, numbness and headaches. Physical Exam  
Physical Exam  
Constitutional: He is oriented to person, place, and time. He appears well-developed and well-nourished. Non-toxic appearance. No distress. HENT:  
Head: Normocephalic and atraumatic. Head is without right periorbital erythema and without left periorbital erythema. Right Ear: External ear normal.  
Left Ear: External ear normal.  
Nose: Nose normal.  
Mouth/Throat: Uvula is midline. No trismus in the jaw. Eyes: Pupils are equal, round, and reactive to light. Conjunctivae and EOM are normal. No scleral icterus. Neck: Normal range of motion and full passive range of motion without pain. Cardiovascular: Normal rate, regular rhythm and normal heart sounds. Pulmonary/Chest: Effort normal and breath sounds normal. No accessory muscle usage. No tachypnea. No respiratory distress. He has no decreased breath sounds. He has no wheezes. Abdominal: Soft. There is no tenderness. There is no rigidity and no guarding. Musculoskeletal: Normal range of motion. Neurological: He is alert and oriented to person, place, and time. He is not disoriented. No cranial nerve deficit or sensory deficit. GCS eye subscore is 4. GCS verbal subscore is 5. GCS motor subscore is 6. Skin: Skin is intact. No rash noted. Tender, fluctuant abscess of the left groin with mild surrounding erythema and no perirectal or scrotal involvement Psychiatric: He has a normal mood and affect. His speech is normal.  
Nursing note and vitals reviewed. Diagnostic Study Results Labs - No results found for this or any previous visit (from the past 12 hour(s)). Radiologic Studies - No orders to display CT Results  (Last 48 hours) None CXR Results  (Last 48 hours) None Medical Decision Making I am the first provider for this patient. I reviewed the vital signs, available nursing notes, past medical history, past surgical history, family history and social history. Vital Signs-Reviewed the patient's vital signs. Patient Vitals for the past 12 hrs: 
 Temp Pulse Resp BP SpO2  
04/12/19 1207 98.7 °F (37.1 °C) 78 16 132/86 95 % Records Reviewed: Nursing Notes, Old Medical Records, Previous Radiology Studies and Previous Laboratory Studies Provider Notes (Medical Decision Making): DDx: Abscess, cellulitis Procedure Note - Incision and Drainage:  
3:36 PM 
Performed by: Chris Romero PA-C Complexity: complex Skin prepped with chlorhexidine. Sterile field established.   Anesthesia achieved with 5 mLs of Lidocaine 1% with epinephrine using a local infiltration. Abscess to left groin was incised with # 11 blade, and a moderate amount of bloody and purulent drainage was expressed. Wound probed and irrigated. Area was packed using 1/2 inch iodoform gauze. Sterile dressing applied. Estimated blood loss: <1mL The procedure took 1-15 minutes, and pt tolerated well. ED Course:  
Initial assessment performed. The patients presenting problems have been discussed, and they are in agreement with the care plan formulated and outlined with them. I have encouraged them to ask questions as they arise throughout their visit. Disposition: 
Discharge PLAN: 
1. Discharge Medication List as of 4/12/2019  3:42 PM  
  
START taking these medications Details  
doxycycline (MONODOX) 100 mg capsule Take 1 Cap by mouth two (2) times a day for 10 days. , Normal, Disp-20 Cap, R-0  
  
  
CONTINUE these medications which have NOT CHANGED Details  
amLODIPine (NORVASC) 10 mg tablet TAKE ONE TABLET BY MOUTH ONCE DAILY, NormalPlease consider 90 day supplies to promote better adherenceDisp-30 Tab, R-12  
  
lisinopril (PRINIVIL, ZESTRIL) 40 mg tablet TAKE 1 TABLET BY MOUTH ONCE DAILY FOR BLOOD PRESSURE AND  KIDNEY  PROTECTION, Normal, Disp-90 Tab, R-3  
  
BYSTOLIC 10 mg tablet TAKE 2 TABLETS BY MOUTH ONCE DAILY, NormalPlease consider 90 day supplies to promote better adherenceDisp-60 Tab, R-6  
  
clotrimazole-betamethasone (LOTRISONE) 1-0.05 % lotion Apply to the effected area every morning, Normal, Disp-30 mL, R-1  
  
insulin glargine (LANTUS SOLOSTAR U-100 INSULIN) 100 unit/mL (3 mL) inpn INJECT 9 UNITS SUB-Q ONCE DAILY, Normal, Disp-15 mL, R-11 SITagliptin-metFORMIN (JANUMET) 50-1,000 mg per tablet TAKE 1 TABLET BY MOUTH ONCE DAILY WITH BREAKFAST AND  ONE  TABLET  WITH  DINNER  (THIS  REPLACES  METFORMIN  AND  Erling Sinner), NormalPlease consider 90 day supplies to promote better adherenceDisp-60 Tab, R-11  
  
glipiZIDE (GLUCOTROL) 10 mg tablet TAKE ONE TABLET BY MOUTH WITH BREAKFAST AND TAKE TWO TABLETS BY MOUTH WITH DINNER, NormalPlease consider 90 day supplies to promote better adherenceDisp-90 Tab, R-11  
  
potassium chloride (KLOR-CON) 10 mEq tablet Take 1 Tab by mouth daily. , NormalPlease consider 90 day supplies to promote better adherenceDisp-30 Tab, R-12  
  
chlorthalidone (HYGROTEN) 25 mg tablet TAKE 1 TABLET BY MOUTH ONCE DAILY, NormalPlease consider 90 day supplies to promote better adherenceDisp-30 Tab, R-6  
  
potassium 99 mg tablet Take 99 mg by mouth daily. , Historical Med  
  
white petrolatum (WHITE PETROLEUM JELLY) topical onitment Apply to the affected area every morning. To uses once the rash is gone., Normal, Disp-2270 g, R-3  
  
glycerin-mineral oil-lanolin (EUCERIN PLUS) topical cream Apply twice daily as needed, Normal, Disp-1 Tube, R-3  
  
cholecalciferol, vitamin D3, (VITAMIN D3) 2,000 unit tab Take  by mouth., Historical Med  
  
acetaminophen (TYLENOL) 325 mg tablet Take  by mouth every four (4) hours as needed for Pain., Historical Med  
  
diphenhydrAMINE (BENADRYL ALLERGY) 25 mg tablet Take 25 mg by mouth every six (6) hours as needed for Sleep., Historical Med  
  
famotidine (PEPCID) 20 mg tablet Take 20 mg by mouth daily. Historical Med, 20 mg  
  
aspirin 81 mg chewable tablet Take 81 mg by mouth daily. Historical Med, 81 mg  
  
atorvastatin (LIPITOR) 40 mg tablet TAKE ONE TABLET BY MOUTH ONCE DAILY, NormalPlease consider 90 day supplies to promote better adherenceDisp-30 Tab, R-12  
  
OTHER Take 1 Tab by mouth daily. HYLANDS LEG CRAMPS, Historical Med  
  
nitroglycerin (NITROSTAT) 0.4 mg SL tablet by SubLINGual route every five (5) minutes as needed. Historical Med 2. Follow-up Information Follow up With Specialties Details Why Contact Info [facility] EMERGENCY DEPT Emergency Medicine In 2 days As needed, If symptoms worsen [address] [facility] 
[phone] Return to ED if worse Diagnosis Clinical Impression: 1. Abscess of left groin

## 2019-04-12 NOTE — ED NOTES
Patient presents to ED with complaints of a \"goose egg to the left side of his groin x1 week. \" He states \"I had a cardiac catheterization done almost a year ago, and I don't know if burst something, but I squeezed the area and blood and pus came out. \" Patient denies fevers and chills. Dr. Damian Johnson is his cardiologist. He states he has showed his cardiologist the area and was told to follow up with PCP for possible infection. Patient states he was started on a medication which helped clear it up, x1 week ago he noticed it was returning.

## 2019-06-05 ENCOUNTER — OFFICE VISIT (OUTPATIENT)
Dept: FAMILY MEDICINE CLINIC | Age: 64
End: 2019-06-05

## 2019-06-05 ENCOUNTER — OFFICE VISIT (OUTPATIENT)
Dept: CARDIOLOGY CLINIC | Age: 64
End: 2019-06-05

## 2019-06-05 ENCOUNTER — HOSPITAL ENCOUNTER (OUTPATIENT)
Dept: LAB | Age: 64
Discharge: HOME | End: 2019-06-05
Payer: MEDICARE

## 2019-06-05 ENCOUNTER — OFFICE VISIT (OUTPATIENT)
Dept: ENDOCRINOLOGY | Age: 64
End: 2019-06-05

## 2019-06-05 VITALS
TEMPERATURE: 97.6 F | HEIGHT: 68 IN | OXYGEN SATURATION: 96 % | SYSTOLIC BLOOD PRESSURE: 107 MMHG | DIASTOLIC BLOOD PRESSURE: 72 MMHG | HEART RATE: 65 BPM | RESPIRATION RATE: 18 BRPM | WEIGHT: 196.8 LBS | BODY MASS INDEX: 29.83 KG/M2

## 2019-06-05 VITALS
OXYGEN SATURATION: 96 % | HEIGHT: 69 IN | SYSTOLIC BLOOD PRESSURE: 112 MMHG | HEART RATE: 72 BPM | BODY MASS INDEX: 29.31 KG/M2 | RESPIRATION RATE: 16 BRPM | DIASTOLIC BLOOD PRESSURE: 66 MMHG | WEIGHT: 197.9 LBS

## 2019-06-05 VITALS
SYSTOLIC BLOOD PRESSURE: 112 MMHG | DIASTOLIC BLOOD PRESSURE: 76 MMHG | WEIGHT: 194.4 LBS | HEART RATE: 67 BPM | BODY MASS INDEX: 28.79 KG/M2 | HEIGHT: 69 IN

## 2019-06-05 DIAGNOSIS — E11.9 TYPE 2 DIABETES MELLITUS WITHOUT COMPLICATION, WITHOUT LONG-TERM CURRENT USE OF INSULIN (HCC): ICD-10-CM

## 2019-06-05 DIAGNOSIS — Z86.73 HISTORY OF ARTERIAL ISCHEMIC STROKE: ICD-10-CM

## 2019-06-05 DIAGNOSIS — H54.40 BLIND RIGHT EYE: ICD-10-CM

## 2019-06-05 DIAGNOSIS — I25.10 CORONARY ARTERY DISEASE INVOLVING NATIVE CORONARY ARTERY OF NATIVE HEART WITHOUT ANGINA PECTORIS: Primary | ICD-10-CM

## 2019-06-05 DIAGNOSIS — I25.2 CORONARY ARTERY DISEASE WITH HISTORY OF MYOCARDIAL INFARCTION WITHOUT HISTORY OF CABG: ICD-10-CM

## 2019-06-05 DIAGNOSIS — I10 ESSENTIAL HYPERTENSION: ICD-10-CM

## 2019-06-05 DIAGNOSIS — Z76.89 ESTABLISHING CARE WITH NEW DOCTOR, ENCOUNTER FOR: ICD-10-CM

## 2019-06-05 DIAGNOSIS — I10 BENIGN ESSENTIAL HYPERTENSION: ICD-10-CM

## 2019-06-05 DIAGNOSIS — K21.9 GASTROESOPHAGEAL REFLUX DISEASE WITHOUT ESOPHAGITIS: ICD-10-CM

## 2019-06-05 DIAGNOSIS — E78.2 MIXED HYPERLIPIDEMIA: ICD-10-CM

## 2019-06-05 DIAGNOSIS — I25.10 CORONARY ARTERY DISEASE WITH HISTORY OF MYOCARDIAL INFARCTION WITHOUT HISTORY OF CABG: ICD-10-CM

## 2019-06-05 DIAGNOSIS — E11.21 TYPE 2 DIABETES MELLITUS WITH DIABETIC NEPHROPATHY, WITH LONG-TERM CURRENT USE OF INSULIN (HCC): Primary | ICD-10-CM

## 2019-06-05 DIAGNOSIS — I74.9 THROMBOEMBOLISM (HCC): ICD-10-CM

## 2019-06-05 DIAGNOSIS — I50.22 CHRONIC SYSTOLIC HEART FAILURE (HCC): ICD-10-CM

## 2019-06-05 DIAGNOSIS — Z79.899 ENCOUNTER FOR LONG-TERM (CURRENT) USE OF MEDICATIONS: ICD-10-CM

## 2019-06-05 DIAGNOSIS — Z79.4 TYPE 2 DIABETES MELLITUS WITH DIABETIC NEPHROPATHY, WITH LONG-TERM CURRENT USE OF INSULIN (HCC): Primary | ICD-10-CM

## 2019-06-05 DIAGNOSIS — B37.2 INTERTRIGINOUS CANDIDIASIS: Primary | ICD-10-CM

## 2019-06-05 PROBLEM — Z86.718 HISTORY OF DVT OF LOWER EXTREMITY: Status: ACTIVE | Noted: 2019-06-05

## 2019-06-05 PROBLEM — Z86.718 HISTORY OF DVT OF LOWER EXTREMITY: Status: RESOLVED | Noted: 2019-06-05 | Resolved: 2019-06-05

## 2019-06-05 LAB — HBA1C MFR BLD HPLC: 11.1 %

## 2019-06-05 PROCEDURE — 84443 ASSAY THYROID STIM HORMONE: CPT

## 2019-06-05 PROCEDURE — 80053 COMPREHEN METABOLIC PANEL: CPT

## 2019-06-05 PROCEDURE — 36415 COLL VENOUS BLD VENIPUNCTURE: CPT

## 2019-06-05 PROCEDURE — 80061 LIPID PANEL: CPT

## 2019-06-05 RX ORDER — CHOLECALCIFEROL (VITAMIN D3) 125 MCG
CAPSULE ORAL
COMMUNITY
End: 2019-08-12

## 2019-06-05 RX ORDER — POTASSIUM CHLORIDE 750 MG/1
TABLET, FILM COATED, EXTENDED RELEASE ORAL
Refills: 12 | COMMUNITY
Start: 2019-05-13 | End: 2019-08-12

## 2019-06-05 RX ORDER — ATORVASTATIN CALCIUM 40 MG/1
TABLET, FILM COATED ORAL
Refills: 12 | COMMUNITY
Start: 2019-05-13 | End: 2019-12-04 | Stop reason: SDUPTHER

## 2019-06-05 RX ORDER — GLIPIZIDE 10 MG/1
TABLET ORAL
Refills: 11 | COMMUNITY
Start: 2019-05-20 | End: 2019-08-12

## 2019-06-05 RX ORDER — AMLODIPINE BESYLATE 10 MG/1
TABLET ORAL
Refills: 12 | COMMUNITY
Start: 2019-05-13 | End: 2019-08-12

## 2019-06-05 RX ORDER — LISINOPRIL 40 MG/1
TABLET ORAL
Refills: 3 | COMMUNITY
Start: 2019-05-13 | End: 2019-08-12

## 2019-06-05 RX ORDER — NEBIVOLOL HYDROCHLORIDE 10 MG/1
TABLET ORAL
Refills: 6 | COMMUNITY
Start: 2019-05-20 | End: 2019-08-12

## 2019-06-05 RX ORDER — NITROGLYCERIN 0.4 MG/1
0.4 TABLET SUBLINGUAL
COMMUNITY
End: 2019-08-12

## 2019-06-05 RX ORDER — CHLORTHALIDONE 25 MG/1
TABLET ORAL
Refills: 6 | COMMUNITY
Start: 2019-05-20 | End: 2019-08-12

## 2019-06-05 RX ORDER — GLIPIZIDE 10 MG/1
10 TABLET, FILM COATED, EXTENDED RELEASE ORAL DAILY
Qty: 90 TAB | Refills: 3 | Status: SHIPPED | OUTPATIENT
Start: 2019-06-05 | End: 2020-08-04

## 2019-06-05 RX ORDER — FLUCONAZOLE 150 MG/1
150 TABLET ORAL
Qty: 7 TAB | Refills: 0 | Status: SHIPPED | OUTPATIENT
Start: 2019-06-05 | End: 2019-06-06

## 2019-06-05 RX ORDER — SITAGLIPTIN AND METFORMIN HYDROCHLORIDE 50; 1000 MG/1; MG/1
TABLET, FILM COATED ORAL
Refills: 6 | COMMUNITY
Start: 2019-05-13 | End: 2019-08-12 | Stop reason: SDUPTHER

## 2019-06-05 RX ORDER — FAMOTIDINE 20 MG/1
20 TABLET, FILM COATED ORAL DAILY
COMMUNITY

## 2019-06-05 RX ORDER — ASPIRIN 81 MG/1
TABLET ORAL DAILY
COMMUNITY
End: 2019-08-12

## 2019-06-05 NOTE — PROGRESS NOTES
Deane Gottron is a 58 y.o. male, who's a new patient to our practice. Previous PCP: none. He sees cardiologist Pa Gee. Dr. Suze Ohara endocrinologist.  He lives [facility] about [place]hrs away. Will see all of us at once when he can.       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 On and off. Under belly and groin rash with sharp demarcation. It's now spread to his butts. Dr. Suze Ohara had given him clotrimazole-betamethasone   We'll start diflucan 1 tab     While on diflucan do not take Vit E quinine sulfate. Dm2, HTN, HLD, CAD, hx of CVA, thromboembolism right eye blind    We'll get chronic labs for above conditions. Last labs on file 07/2017      Reviewed: active problem list, medication list, allergies, notes from last encounter, lab results    A comprehensive review of systems was negative except for that written in the HPI, on 14 ROS.        No Known Allergies  Current Outpatient Medications on File Prior to Visit   Medication Sig Dispense Refill    amLODIPine (NORVASC) 10 mg tablet   12    lisinopril (PRINIVIL, ZESTRIL) 40 mg tablet   3    atorvastatin (LIPITOR) 40 mg tablet   12    JANUMET 50-1,000 mg per tablet   6    BYSTOLIC 10 mg tablet TAKE 2 TABLETS BY MOUTH ONCE DAILY  6    glipiZIDE (GLUCOTROL) 10 mg tablet TAKE 1 TABLET BY MOUTH WITH BREAKFAST AND  TWO  TABLETS  WITH  DINNER  11    chlorthalidone (HYGROTEN) 25 mg tablet TAKE 1 TABLET BY MOUTH ONCE DAILY  6    potassium chloride SR (KLOR-CON 10) 10 mEq tablet   12    famotidine (PEPCID) 20 mg tablet Take 20 mg by mouth two (2) times a day.  cholecalciferol, vitamin D3, (VITAMIN D3) 2,000 unit tab Take  by mouth.  potassium 99 mg tablet Take 99 mg by mouth daily.  vitamin E/quinine sulfate (LEG CRAMP TREATMENT PO) Take  by mouth.  aspirin delayed-release 81 mg tablet Take  by mouth daily.  nitroglycerin (NITROSTAT) 0.4 mg SL tablet 0.4 mg by SubLINGual route every five (5) minutes as needed for Chest Pain. Up to 3 doses. No current facility-administered medications on file prior to visit. Patient Active Problem List   Diagnosis Code    Essential hypertension I10    Mixed hyperlipidemia E78.2    Gastroesophageal reflux disease without esophagitis K21.9    Blind right eye H54.40    Coronary artery disease with history of myocardial infarction without history of CABG I25.10, I25.2    Thromboembolism ([facility] [address] I74.9    Encounter for long-term (current) use of medications Z79.899    Type 2 diabetes mellitus without complication, without long-term current use of insulin ([facility]) E11.9       Visit Vitals  /72   Pulse 65   Temp 97.6 °F (36.4 °C) (Oral)   Resp 18   Ht 5' 8\" (1.727 m)   Wt 196 lb 12.8 oz (89.3 kg)   SpO2 96%   BMI 29.92 kg/m²     General appearance: alert, cooperative, no distress, appears stated age  Neurologic: Alert and oriented X 3, normal strength and tone, symmetric. Normal without focal findings. Cranial nerves 2-12 intact. Normal coordination and gait. Mental status: Alert, oriented, thought content appropriate, affect: stable, mood-congruent. Head: Normocephalic, without obvious abnormality, atraumatic  Eyes: conjunctivae/corneas clear. PERRL, EOM's intact. Neck: supple, symmetrical, trachea midline, no JVD  Lungs: clear to auscultation bilaterally  Heart: regular rate and rhythm, S1, S2 normal, no murmur, click, rub or gallop  Abdomen: soft, non-tender.    Extremities: extremities normal, atraumatic, no cyanosis or edema    Skin: intertrigo: dark red, scaly, sharp demarcation with satelite lesions. Under abdo fat and groin and a few spots on buttocks. Assessment/Plans:    Diagnoses and all orders for this visit:    1. Intertriginous candidiasis  -     fluconazole (DIFLUCAN) 150 mg tablet; Take 1 Tab by mouth every fourty-eight (48) hours for 1 day. FDA advises cautious prescribing of oral fluconazole in pregnancy. 2. Establishing care with new doctor, encounter for    3. Essential hypertension  -     METABOLIC PANEL, COMPREHENSIVE  -     TSH 3RD GENERATION    4. Type 2 diabetes mellitus without complication, without long-term current use of insulin (HCC)  -     METABOLIC PANEL, COMPREHENSIVE  -     TSH 3RD GENERATION    5. Mixed hyperlipidemia  -     LIPID PANEL    6. Gastroesophageal reflux disease without esophagitis    7. Encounter for long-term (current) use of medications  -     METABOLIC PANEL, COMPREHENSIVE  -     TSH 3RD GENERATION  -     LIPID PANEL    8. History of arterial ischemic stroke    9. Thromboembolism ([facility] [address]    10. Coronary artery disease with history of myocardial infarction without history of CABG    11. Blind right eye      Discussed plans, risk/benefits of treatments/observations. Through the use of shared decision making, above plans were agreed upon. Medication compliance advised. Patient verbalized understanding.      Follow-up and Dispositions    · Return in about 2 months (around 8/5/2019) for annual exam.           Magen Sesay MD  6/5/2019

## 2019-06-05 NOTE — PROGRESS NOTES
Chief Complaint   Patient presents with    Diabetes     pcp and pharmacy verified. Eye exam over year   Records since his last visit reviewed. History of Present Illness: Queen Sam is a 58 y.o. male here for follow up of diabetes. At his last visit in November 2018 his A1C was 8.0% on Janumet 50/1000mg BID, glipizide 10mg with breakfast and 10mg with dinner and Lantus 9 units daily. When we increased his Glipizide he was having leg cramps, when we increased his Lantus he had episodes of hypoglycemia. In April 2019 pt presented to the ED with a lump in his left groin, he had an abscess that was I&Ded and he was given doxycycline. Pt notes that he was having issues of pain in his legs and difficulty with walking so he adjusted what he was taking and he notes that his leg pain and walking have improved. Pt is currently taking Glipizde 10mg in the AM only and Janumet 50/1000mg in the AM only and Lantus 7 units in the morning. His A1C today was 11.0%    He has not had any low blood sugars  He is not checking his blood sugars. He denies issues of CP, SOB, polyuria and polydipsia. Pt is eating 3 meals daily. His largest meal is Dinner. He has breakfast between 8-9AM, today he had a suazo, egg and egg sandwiches and a regular soda. He will have lunch around Noon-1PM, yesterday he had a ham sandwich and coffee   Last night for dinner he had a chicken sandwich, chips and a small cake. He is followed by Dr. Yao Mckeon of cardiology. He saw Dr. Yao Mckeon today Oralia Rodriguez said everything was excellent\". He stays busy chopping wood and yard work and work around [employer]. 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 No history of retinopathy, neuropathy, but has some nephropathy. Last eye exam was April 2018, no retinopathy. Current Outpatient Medications   Medication Sig    OTHER,NON-FORMULARY, 1 Tab daily.  OTC Leg Cramps    glipiZIDE SR (GLUCOTROL XL) 10 mg CR tablet Take 1 Tab by mouth daily.  SITagliptin-metFORMIN (JANUMET XR) 100-1,000 mg TM24 Take 1 Tab by mouth daily.  amLODIPine (NORVASC) 10 mg tablet TAKE ONE TABLET BY MOUTH ONCE DAILY    atorvastatin (LIPITOR) 40 mg tablet TAKE ONE TABLET BY MOUTH ONCE DAILY    lisinopril (PRINIVIL, ZESTRIL) 40 mg tablet TAKE 1 TABLET BY MOUTH ONCE DAILY FOR BLOOD PRESSURE AND  KIDNEY  PROTECTION    BYSTOLIC 10 mg tablet TAKE 2 TABLETS BY MOUTH ONCE DAILY    clotrimazole-betamethasone (LOTRISONE) 1-0.05 % lotion Apply to the effected area every morning    insulin glargine (LANTUS SOLOSTAR U-100 INSULIN) 100 unit/mL (3 mL) inpn INJECT 9 UNITS SUB-Q ONCE DAILY (Patient taking differently: INJECT 7 UNITS SUB-Q ONCE DAILY IN AM)    potassium chloride (KLOR-CON) 10 mEq tablet Take 1 Tab by mouth daily.  chlorthalidone (HYGROTEN) 25 mg tablet TAKE 1 TABLET BY MOUTH ONCE DAILY    OTHER Take 1 Tab by mouth daily. HYLANDS LEG CRAMPS    potassium 99 mg tablet Take 99 mg by mouth daily.  white petrolatum (WHITE PETROLEUM JELLY) topical onitment Apply to the affected area every morning. To uses once the rash is gone.  glycerin-mineral oil-lanolin (EUCERIN PLUS) topical cream Apply twice daily as needed    cholecalciferol, vitamin D3, (VITAMIN D3) 2,000 unit tab Take  by mouth.  acetaminophen (TYLENOL) 325 mg tablet Take  by mouth every four (4) hours as needed for Pain.  diphenhydrAMINE (BENADRYL ALLERGY) 25 mg tablet Take 25 mg by mouth as needed for Sleep (for bee stings).  famotidine (PEPCID) 20 mg tablet Take 20 mg by mouth daily.  nitroglycerin (NITROSTAT) 0.4 mg SL tablet by SubLINGual route every five (5) minutes as needed.  aspirin 81 mg chewable tablet Take 81 mg by mouth daily. No current facility-administered medications for this visit.       No Known Allergies  Review of Systems:  - Eyes: no blurry vision or double vision  - Cardiovascular: no chest pain  - Respiratory: no shortness of breath  - Musculoskeletal: no myalgias  - Neurological: no numbness/tingling in extremities    Physical Examination:  Blood pressure 112/76, pulse 67, height 5' 9\" (1.753 m), weight 194 lb 6.4 oz (88.2 kg). - General: pleasant, no distress, good eye contact   - Neck: no carotid bruits  - Cardiovascular: regular, normal rate, nl s1 and s2, no m/r/g, 2+ DP pulses   - Respiratory: clear bilaterally  - Integumentary: no edema, no foot ulcers,   - Psychiatric: normal mood and affect    Diabetic foot exam:     Left Foot:   Visual Exam: callous - present   Pulse DP: 2+ (normal)   Filament test: normal sensation    Vibratory sensation: normal      Right Foot:   Visual Exam: callous - present   Pulse DP: 2+ (normal)   Filament test: normal sensation    Vibratory sensation: normal        Data Reviewed:   His A1C today was 11.0%    Assessment/Plan:   1) DM > His A1C today was 11.0%. Will have pt to increase his Lantus back to 9 units daily and will [place] his Glipizide to Glipizide ER 10mg daily and will change the Janumet to Janumet XR to 100/1000mg daliy  His Goal A1C is 8%, or less with no hypoglycemia. Pt to check his blood sugars twice per day. 2) HTN > His BP is at goal and is on an ACEI for renal protection      Pt voices understanding and agreement with the plan. RTC 6 months    Follow-up and Dispositions    · Return in about 6 months (around 12/5/2019).          Copy sent to:  Dr. Irvin Murray

## 2019-06-05 NOTE — PROGRESS NOTES
1. Have you been to the ER, urgent care clinic since your last visit? Hospitalized since your last visit? YES, ER MAY 2019, GROIN. ON LEG. 2. Have you seen or consulted any other health care providers outside of the [address] [place] [address] since your last visit? Include any pap smears or colon screening.  NO    6 MONTH FOLLOW UP. NO CARDIAC C/O.,

## 2019-06-05 NOTE — PATIENT INSTRUCTIONS
1) Stop the Glipizide and Janumet    2) Increase the Lantus to 9 units every day. 3) I am starting Glipizide ER 10mg every morning and Janumet 100/1000mg, one pill every morning.

## 2019-06-06 LAB
ALBUMIN SERPL-MCNC: 4.3 G/DL (ref 3.6–4.8)
ALBUMIN/CREAT UR: 111 MG/G CREAT (ref 0–30)
ALBUMIN/GLOB SERPL: 1.6 {RATIO} (ref 1.2–2.2)
ALP SERPL-CCNC: 73 IU/L (ref 39–117)
ALT SERPL-CCNC: 21 IU/L (ref 0–44)
AST SERPL-CCNC: 14 IU/L (ref 0–40)
BILIRUB SERPL-MCNC: 0.6 MG/DL (ref 0–1.2)
BUN SERPL-MCNC: 17 MG/DL (ref 8–27)
BUN/CREAT SERPL: 14 (ref 10–24)
CALCIUM SERPL-MCNC: 9.7 MG/DL (ref 8.6–10.2)
CHLORIDE SERPL-SCNC: 98 MMOL/L (ref 96–106)
CHOLEST SERPL-MCNC: 116 MG/DL (ref 100–199)
CO2 SERPL-SCNC: 26 MMOL/L (ref 20–29)
CREAT SERPL-MCNC: 1.21 MG/DL (ref 0.76–1.27)
CREAT UR-MCNC: 85.3 MG/DL
GLOBULIN SER CALC-MCNC: 2.7 G/DL (ref 1.5–4.5)
GLUCOSE SERPL-MCNC: 182 MG/DL (ref 65–99)
HDLC SERPL-MCNC: 27 MG/DL
LDLC SERPL CALC-MCNC: 49 MG/DL (ref 0–99)
MICROALBUMIN UR-MCNC: 94.7 UG/ML
POTASSIUM SERPL-SCNC: 3.9 MMOL/L (ref 3.5–5.2)
PROT SERPL-MCNC: 7 G/DL (ref 6–8.5)
SODIUM SERPL-SCNC: 138 MMOL/L (ref 134–144)
TRIGL SERPL-MCNC: 201 MG/DL (ref 0–149)
TSH SERPL DL<=0.005 MIU/L-ACNC: 1.13 UIU/ML (ref 0.45–4.5)
VLDLC SERPL CALC-MCNC: 40 MG/DL (ref 5–40)

## 2019-06-10 NOTE — PROGRESS NOTES
NAME:  Deane Gottron   :   1955   MRN:   [id number]   PCP:  Yue Mccann    Subjective: The patient is a 58y.o. year old male  who returns for a routine follow-up. Since the last visit, patient reports no change in exercise tolerance, chest pain, edema, medication intolerance, palpitations, shortness of breath, PND/orthopnea wheezing, sputum, syncope, dizziness or light headedness. Doing well. Past Medical History:   Diagnosis Date    Blind right eye 2019    CAD (coronary artery disease)     COPD     Coronary artery disease with history of myocardial infarction without history of CABG 2019    CVA (cerebral infarction) 3/24/2012    Diabetes ([facility] [address]     Essential hypertension 2019    Gastroesophageal reflux disease without esophagitis 2019    Hypertension     MI (myocardial infarction) ([facility] [address]     Mixed hyperlipidemia 2019    Other ill-defined conditions(799.89)     high cholesterol    Stroke ([facility] [address] 3/2012    from previous medical record    Stroke [facility])     Thromboembolism ([facility] [address] 2019    Thromboembolus ([facility] [address]         ICD-10-CM ICD-9-CM    1. Coronary artery disease involving native coronary artery of native heart without angina pectoris I25.10 414.01 AMB POC EKG ROUTINE W/ 12 LEADS, INTER & REP   2. Chronic systolic heart failure (HCC) I50.22 428.22    3. Essential hypertension I10 401.9    4. Mixed hyperlipidemia E78.2 272.2    5.  Type 2 diabetes mellitus without complication, without long-term current use of insulin (HCC) E11.9 250.00       Social History     Tobacco Use    Smoking status: Current Every Day Smoker     Packs/day: 1.50     Years: 55.00     Pack years: 82.50     Types: Cigarettes    Smokeless tobacco: Never Used   Substance Use Topics    Alcohol use: Not Currently     Comment: occasionally      Family History   Problem Relation Age of Onset    Heart Disease Mother     Coronary Artery Disease Father    [facility] Coronary Artery Disease Other         Mother and brother in their 52's        Review of Systems  General: Pt denies excessive weight gain or loss. Pt is able to conduct ADL's  HEENT: Denies blurred vision, headaches, epistaxis and difficulty swallowing. Respiratory: Denies shortness of breath, PERAZA, wheezing or stridor. Cardiovascular: Denies precordial pain, palpitations, edema or PND  Gastrointestinal: Denies poor appetite, indigestion, abdominal pain or blood in stool  Musculoskeletal: Denies pain or swelling from muscles or joints  Neurologic: Denies tremor, paresthesias, or sensory motor disturbance  Skin: Denies rash, itching or texture change. Objective:       Vitals:    06/05/19 0933 06/05/19 0949   BP: 110/60 112/66   Pulse: 72    Resp: 16    SpO2: 96%    Weight: 197 lb 14.4 oz (89.8 kg)    Height: 5' 9\" (1.753 m)     Body mass index is 29.22 kg/m². General PE  Mental Status - Alert. General Appearance - Not in acute distress. Chest and Lung Exam   Inspection: Accessory muscles - No use of accessory muscles in breathing. Auscultation:   Breath sounds: - Normal.    Cardiovascular   Inspection: Jugular vein - Bilateral - Inspection Normal.  Palpation/Percussion:   Apical Impulse: - Normal.  Auscultation: Rhythm - Regular. Heart Sounds - S1 WNL and S2 WNL. No S3 or S4. Murmurs & Other Heart Sounds: Auscultation of the heart reveals - No Murmurs. Peripheral Vascular   Upper Extremity: Inspection - Bilateral - No Cyanotic nailbeds or Digital clubbing. Lower Extremity:   Palpation: Edema - Bilateral - No edema. Data Review:     EKG -EKG: normal EKG, normal sinus rhythm, unchanged from previous tracings, nonspecific ST and T waves changes.     Medications reviewed  Current Outpatient Medications   Medication Sig    amLODIPine (NORVASC) 10 mg tablet TAKE ONE TABLET BY MOUTH ONCE DAILY    atorvastatin (LIPITOR) 40 mg tablet TAKE ONE TABLET BY MOUTH ONCE DAILY    lisinopril (PRINIVIL, ZESTRIL) 40 mg tablet TAKE 1 TABLET BY MOUTH ONCE DAILY FOR BLOOD PRESSURE AND  KIDNEY  PROTECTION    BYSTOLIC 10 mg tablet TAKE 2 TABLETS BY MOUTH ONCE DAILY    clotrimazole-betamethasone (LOTRISONE) 1-0.05 % lotion Apply to the effected area every morning    insulin glargine (LANTUS SOLOSTAR U-100 INSULIN) 100 unit/mL (3 mL) inpn INJECT 9 UNITS SUB-Q ONCE DAILY (Patient taking differently: INJECT 7 UNITS SUB-Q ONCE DAILY IN AM)    potassium chloride (KLOR-CON) 10 mEq tablet Take 1 Tab by mouth daily.  chlorthalidone (HYGROTEN) 25 mg tablet TAKE 1 TABLET BY MOUTH ONCE DAILY    OTHER Take 1 Tab by mouth daily. HYLANDS LEG CRAMPS    potassium 99 mg tablet Take 99 mg by mouth daily.  white petrolatum (WHITE PETROLEUM JELLY) topical onitment Apply to the affected area every morning. To uses once the rash is gone.  glycerin-mineral oil-lanolin (EUCERIN PLUS) topical cream Apply twice daily as needed    cholecalciferol, vitamin D3, (VITAMIN D3) 2,000 unit tab Take  by mouth.  acetaminophen (TYLENOL) 325 mg tablet Take  by mouth every four (4) hours as needed for Pain.  diphenhydrAMINE (BENADRYL ALLERGY) 25 mg tablet Take 25 mg by mouth as needed for Sleep (for bee stings).  famotidine (PEPCID) 20 mg tablet Take 20 mg by mouth daily.  nitroglycerin (NITROSTAT) 0.4 mg SL tablet by SubLINGual route every five (5) minutes as needed.  aspirin 81 mg chewable tablet Take 81 mg by mouth daily.  OTHER,NON-FORMULARY, 1 Tab daily. OTC Leg Cramps    glipiZIDE SR (GLUCOTROL XL) 10 mg CR tablet Take 1 Tab by mouth daily.  SITagliptin-metFORMIN (JANUMET XR) 100-1,000 mg TM24 Take 1 Tab by mouth daily.     amLODIPine (NORVASC) 10 mg tablet     lisinopril (PRINIVIL, ZESTRIL) 40 mg tablet     atorvastatin (LIPITOR) 40 mg tablet     JANUMET 50-1,000 mg per tablet     BYSTOLIC 10 mg tablet TAKE 2 TABLETS BY MOUTH ONCE DAILY    glipiZIDE (GLUCOTROL) 10 mg tablet TAKE 1 TABLET BY MOUTH WITH BREAKFAST AND  TWO  TABLETS  WITH  DINNER    chlorthalidone (HYGROTEN) 25 mg tablet TAKE 1 TABLET BY MOUTH ONCE DAILY    potassium chloride SR (KLOR-CON 10) 10 mEq tablet     famotidine (PEPCID) 20 mg tablet Take 20 mg by mouth two (2) times a day.  cholecalciferol, vitamin D3, (VITAMIN D3) 2,000 unit tab Take  by mouth.  potassium 99 mg tablet Take 99 mg by mouth daily.  vitamin E/quinine sulfate (LEG CRAMP TREATMENT PO) Take  by mouth.  aspirin delayed-release 81 mg tablet Take  by mouth daily.  nitroglycerin (NITROSTAT) 0.4 mg SL tablet 0.4 mg by SubLINGual route every five (5) minutes as needed for Chest Pain. Up to 3 doses. No current facility-administered medications for this visit. Assessment:       ICD-10-CM ICD-9-CM    1. Coronary artery disease involving native coronary artery of native heart without angina pectoris I25.10 414.01 AMB POC EKG ROUTINE W/ 12 LEADS, INTER & REP   2. Chronic systolic heart failure (HCC) I50.22 428.22    3. Essential hypertension I10 401.9    4. Mixed hyperlipidemia E78.2 272.2    5. Type 2 diabetes mellitus without complication, without long-term current use of insulin (HCC) E11.9 250.00         Plan:     Patient presents doing well and stable from cardiac standpoint. Has diffuse rash, which is chronic. Has f/u with pcp this afternoon. Continue current care and follow up in 6 months.     Channing Jalloh MD

## 2019-06-20 RX ORDER — CHLORTHALIDONE 25 MG/1
TABLET ORAL
Qty: 30 TAB | Refills: 6 | Status: SHIPPED | OUTPATIENT
Start: 2019-06-20 | End: 2020-01-28

## 2019-07-15 ENCOUNTER — TELEPHONE (OUTPATIENT)
Dept: ENDOCRINOLOGY | Age: 64
End: 2019-07-15

## 2019-07-15 NOTE — TELEPHONE ENCOUNTER
----- Message from Silvia Cruz sent at 7/15/2019  1:32 PM EDT -----  Regarding: Dr Yumiko Bales first and last name: Patient       Reason for call: pt is reaching out to report that he is experiencing intense pain in his leg from his ankles up to his hip and back. Medication was switched and needs to find out what to do.       Callback required yes/no and why: yes/confirmation      Best contact number(s):[phone]      Details to clarify the request:      Silvia Cruz

## 2019-07-17 NOTE — TELEPHONE ENCOUNTER
Patient called again, complaining of terrible pain. He cannot even get off the couch, the pain is so bad. He can be reached at:  [phone].

## 2019-07-18 NOTE — TELEPHONE ENCOUNTER
----- Message from Fallon Mirza sent at 7/18/2019  2:38 PM EDT -----  Regarding: Dr Mosley/telephone  Patient return call    Caller's first and last name and relationship (if not the patient):      Best contact number(s): (h) [phone], said home phone number best to reach him with now      Whose call is being returned: nurses      Details to clarify the request: was to call back to discuss his leg issues      Fallon Mirza

## 2019-07-19 NOTE — TELEPHONE ENCOUNTER
Spoke with patient earlier today. He states that his right leg has been throbbing. From his ankle up to his hip. The left is not so bad now. He thinks that by increasing Janumet to 100/1000mg may have something to do with his leg pain. He takes 9 units of Lantus in AMs. He wonders if he can adjust his Janumet to take 1 and 1/2 tabs of Janumet to see if his legs would feel better and perhaps reduce Lantus dose. He states that he has not been checking his blood sugars because he is unsure how to work his glucometer. The woman that was helping him has left. Advised him to call the help line for his glucometer to have them help him. He agreed. Advised to check his blood sugars and call next week with the readings. Patient agreed.

## 2019-07-22 RX ORDER — NEBIVOLOL HYDROCHLORIDE 10 MG/1
TABLET ORAL
Qty: 60 TAB | Refills: 6 | Status: SHIPPED | OUTPATIENT
Start: 2019-07-22 | End: 2020-02-26

## 2019-07-22 NOTE — TELEPHONE ENCOUNTER
Patient has been advised and expressed understanding. He is now checking his blood sugars. He has an appt with PCP on 8/12/19. He will bring his blood sugar log here for  to review.

## 2019-08-05 ENCOUNTER — APPOINTMENT (OUTPATIENT)
Dept: ULTRASOUND IMAGING | Age: 64
End: 2019-08-05
Attending: EMERGENCY MEDICINE
Payer: MEDICARE

## 2019-08-05 ENCOUNTER — APPOINTMENT (OUTPATIENT)
Dept: GENERAL RADIOLOGY | Age: 64
End: 2019-08-05
Attending: EMERGENCY MEDICINE
Payer: MEDICARE

## 2019-08-05 ENCOUNTER — HOSPITAL ENCOUNTER (EMERGENCY)
Age: 64
Discharge: HOME | End: 2019-08-06
Attending: EMERGENCY MEDICINE | Admitting: EMERGENCY MEDICINE
Payer: MEDICARE

## 2019-08-05 VITALS
OXYGEN SATURATION: 97 % | HEIGHT: 69 IN | WEIGHT: 194.45 LBS | BODY MASS INDEX: 28.8 KG/M2 | HEART RATE: 75 BPM | SYSTOLIC BLOOD PRESSURE: 137 MMHG | RESPIRATION RATE: 17 BRPM | TEMPERATURE: 98.4 F | DIASTOLIC BLOOD PRESSURE: 94 MMHG

## 2019-08-05 DIAGNOSIS — R60.0 LEG EDEMA, RIGHT: Primary | ICD-10-CM

## 2019-08-05 DIAGNOSIS — M79.604 LEG PAIN, LATERAL, RIGHT: ICD-10-CM

## 2019-08-05 PROCEDURE — 73590 X-RAY EXAM OF LOWER LEG: CPT

## 2019-08-05 PROCEDURE — 99283 EMERGENCY DEPT VISIT LOW MDM: CPT

## 2019-08-05 PROCEDURE — 93971 EXTREMITY STUDY: CPT

## 2019-08-05 PROCEDURE — 74011250637 HC RX REV CODE- 250/637: Performed by: EMERGENCY MEDICINE

## 2019-08-05 RX ORDER — OXYCODONE HYDROCHLORIDE 5 MG/1
10 TABLET ORAL
Status: COMPLETED | OUTPATIENT
Start: 2019-08-05 | End: 2019-08-05

## 2019-08-05 RX ADMIN — OXYCODONE HYDROCHLORIDE 10 MG: 5 TABLET ORAL at 22:22

## 2019-08-06 RX ORDER — OXYCODONE HYDROCHLORIDE 5 MG/1
5 TABLET ORAL
Qty: 12 TAB | Refills: 0 | Status: SHIPPED | OUTPATIENT
Start: 2019-08-06 | End: 2019-08-09

## 2019-08-06 NOTE — ED NOTES
Assumed pt care from triage. Per pt he hit his leg on the corner of his tractor hitch 2 weeks ago. Pt c/o constant right lower leg pain. Pt states he has been using otc pain medication with no relief.

## 2019-08-06 NOTE — ED NOTES
Pt provided with discharge paperwork by MD. Pt verbalized understanding of discharge paperwork and follow up information. Pt ambulated out of ed without assistance.

## 2019-08-11 NOTE — ED PROVIDER NOTES
EMERGENCY DEPARTMENT HISTORY AND PHYSICAL EXAM      Date: 8/5/2019  Patient Name: Yazmin Bloom    History of Presenting Illness     Chief Complaint   Patient presents with    Leg Injury       History Provided By: Patient    HPI: Yazmin Bloom, 71 y.o. male with PMHx significant for hypertension, diabetes, high cholesterol, GERD, CVA, thromboembolism, COPD, presents by POV to the ED with cc of right lower leg pain. Patient states approximately 2 weeks ago he had struck his lower leg trailer hitch struck. Patient had pain immediately following this and over the last 2 weeks the pain has not proved. Patient states he has difficulty bearing weight due to the pain. Patient states he is also noticed swelling beginning in the proximal calf and extending all the way down to his ankle. Patient states that the pain is constant at a rate at 8 out of 10 worsened by movement and attempting to bear weight. Patient states the only alleviating factors are not walking on it. Patient states he has been doing warm soaks in the bath tub with Epsom salts without improvement patient has not been using ice or any stretches at this point. Patient denies any chest pain or shortness of breath. Patient denies any nausea vomiting diarrhea. Patient denies any fever or chills. There are no other complaints, changes, or physical findings at this time. PCP: Anatoly Joseph MD    No current facility-administered medications on file prior to encounter. Current Outpatient Medications on File Prior to Encounter   Medication Sig Dispense Refill    BYSTOLIC 10 mg tablet TAKE 2 TABLETS BY MOUTH ONCE DAILY 60 Tab 6    chlorthalidone (HYGROTEN) 25 mg tablet TAKE 1 TABLET BY MOUTH ONCE DAILY 30 Tab 6    OTHER,NON-FORMULARY, 1 Tab daily. OTC Leg Cramps      glipiZIDE SR (GLUCOTROL XL) 10 mg CR tablet Take 1 Tab by mouth daily. 90 Tab 3    SITagliptin-metFORMIN (JANUMET XR) 100-1,000 mg TM24 Take 1 Tab by mouth daily.  90 Tab 3    amLODIPine (NORVASC) 10 mg tablet   12    lisinopril (PRINIVIL, ZESTRIL) 40 mg tablet   3    atorvastatin (LIPITOR) 40 mg tablet   12    JANUMET 50-1,000 mg per tablet   6    BYSTOLIC 10 mg tablet TAKE 2 TABLETS BY MOUTH ONCE DAILY  6    glipiZIDE (GLUCOTROL) 10 mg tablet TAKE 1 TABLET BY MOUTH WITH BREAKFAST AND  TWO  TABLETS  WITH  DINNER  11    chlorthalidone (HYGROTEN) 25 mg tablet TAKE 1 TABLET BY MOUTH ONCE DAILY  6    potassium chloride SR (KLOR-CON 10) 10 mEq tablet   12    famotidine (PEPCID) 20 mg tablet Take 20 mg by mouth two (2) times a day.  cholecalciferol, vitamin D3, (VITAMIN D3) 2,000 unit tab Take  by mouth.  potassium 99 mg tablet Take 99 mg by mouth daily.  vitamin E/quinine sulfate (LEG CRAMP TREATMENT PO) Take  by mouth.  aspirin delayed-release 81 mg tablet Take  by mouth daily.  nitroglycerin (NITROSTAT) 0.4 mg SL tablet 0.4 mg by SubLINGual route every five (5) minutes as needed for Chest Pain. Up to 3 doses.  amLODIPine (NORVASC) 10 mg tablet TAKE ONE TABLET BY MOUTH ONCE DAILY 30 Tab 12    atorvastatin (LIPITOR) 40 mg tablet TAKE ONE TABLET BY MOUTH ONCE DAILY 30 Tab 12    lisinopril (PRINIVIL, ZESTRIL) 40 mg tablet TAKE 1 TABLET BY MOUTH ONCE DAILY FOR BLOOD PRESSURE AND  KIDNEY  PROTECTION 90 Tab 3    clotrimazole-betamethasone (LOTRISONE) 1-0.05 % lotion Apply to the effected area every morning 30 mL 1    insulin glargine (LANTUS SOLOSTAR U-100 INSULIN) 100 unit/mL (3 mL) inpn INJECT 9 UNITS SUB-Q ONCE DAILY (Patient taking differently: INJECT 7 UNITS SUB-Q ONCE DAILY IN AM) 15 mL 11    potassium chloride (KLOR-CON) 10 mEq tablet Take 1 Tab by mouth daily. 30 Tab 12    OTHER Take 1 Tab by mouth daily. HYLANDS LEG CRAMPS      potassium 99 mg tablet Take 99 mg by mouth daily.  white petrolatum (WHITE PETROLEUM JELLY) topical onitment Apply to the affected area every morning. To uses once the rash is gone.  2270 g 3    glycerin-mineral oil-lanolin (EUCERIN PLUS) topical cream Apply twice daily as needed 1 Tube 3    cholecalciferol, vitamin D3, (VITAMIN D3) 2,000 unit tab Take  by mouth.  acetaminophen (TYLENOL) 325 mg tablet Take  by mouth every four (4) hours as needed for Pain.  diphenhydrAMINE (BENADRYL ALLERGY) 25 mg tablet Take 25 mg by mouth as needed for Sleep (for bee stings).  famotidine (PEPCID) 20 mg tablet Take 20 mg by mouth daily.  nitroglycerin (NITROSTAT) 0.4 mg SL tablet by SubLINGual route every five (5) minutes as needed.  aspirin 81 mg chewable tablet Take 81 mg by mouth daily.            Past History     Past Medical History:  Past Medical History:   Diagnosis Date    Blind right eye 6/5/2019    CAD (coronary artery disease)     COPD     Coronary artery disease with history of myocardial infarction without history of CABG 6/5/2019    CVA (cerebral infarction) 3/24/2012    Diabetes ([address]     Essential hypertension 6/5/2019    Gastroesophageal reflux disease without esophagitis 6/5/2019    Hypertension     MI (myocardial infarction) ([address]     Mixed hyperlipidemia 6/5/2019    Other ill-defined conditions(799.89)     high cholesterol    Stroke ([address] 3/2012    from previous medical record    Stroke ([address]     Thromboembolism ([address] 6/5/2019    Thromboembolus ([address]        Past Surgical History:  Past Surgical History:   Procedure Laterality Date    CARDIAC SURG PROCEDURE UNLIST      cabg, stents    CARDIAC SURG PROCEDURE UNLIST      COMB R&L HEART CATH  3/24/2012         HX COLONOSCOPY      HX HERNIA REPAIR      HX OTHER SURGICAL      cyst removed from right chest    HX UROLOGICAL      kidney stone removed       Family History:  Family History   Problem Relation Age of Onset    Heart Disease Mother     Coronary Artery Disease Father     Coronary Artery Disease Other         Mother and brother in their 52's       Social History:  Social History     Tobacco Use    Smoking status: Current Every Day Smoker     Packs/day: 1.50     Years: 55.00     Pack years: 82.50     Types: Cigarettes    Smokeless tobacco: Never Used   Substance Use Topics    Alcohol use: Not Currently     Comment: occasionally    Drug use: Never       Allergies:  No Known Allergies      Review of Systems   Review of Systems   Constitutional: Negative. Negative for appetite change, chills, fatigue and fever. HENT: Negative. Negative for congestion, rhinorrhea, sinus pressure and sore throat. Eyes: Negative. Respiratory: Negative. Negative for cough, choking, chest tightness, shortness of breath and wheezing. Cardiovascular: Negative. Negative for chest pain, palpitations and leg swelling. Gastrointestinal: Negative for abdominal pain, constipation, diarrhea, nausea and vomiting. Endocrine: Negative. Genitourinary: Negative. Negative for difficulty urinating, dysuria, flank pain and urgency. Musculoskeletal: Positive for myalgias (right lower leg). Skin: Negative. Neurological: Negative. Negative for dizziness, speech difficulty, weakness, light-headedness, numbness and headaches. Psychiatric/Behavioral: Negative. All other systems reviewed and are negative. Physical Exam   Physical Exam   Constitutional: He is oriented to person, place, and time. He appears well-developed and well-nourished. No distress. HENT:   Head: Normocephalic and atraumatic. Mouth/Throat: Oropharynx is clear and moist. No oropharyngeal exudate. Eyes: Pupils are equal, round, and reactive to light. Conjunctivae and EOM are normal.   Neck: Normal range of motion. Neck supple. No JVD present. No tracheal deviation present. Cardiovascular: Normal rate, regular rhythm, normal heart sounds and intact distal pulses. No murmur heard. Pulmonary/Chest: Effort normal and breath sounds normal. No stridor. No respiratory distress. He has no wheezes. He has no rales. He exhibits no tenderness. Abdominal: Soft. He exhibits no distension. There is no tenderness. There is no rebound and no guarding. Musculoskeletal: Normal range of motion. He exhibits no edema or tenderness. RLE, swelling of posterior/medial calf with ttp, no ecchymosis seen, distal PMS intact, no erythema warmth   Neurological: He is alert and oriented to person, place, and time. No cranial nerve deficit. No gross motor or sensory deficits    Skin: Skin is warm and dry. He is not diaphoretic. Psychiatric: He has a normal mood and affect. His behavior is normal.   Nursing note and vitals reviewed. Diagnostic Study Results     Labs -  None  Radiologic Studies -   XR TIB/FIB RT   Final Result   IMPRESSION: No acute abnormality. CT Results  (Last 48 hours)    None        CXR Results  (Last 48 hours)    None        Patient had an ultrasound duplex of the right lower extremity rule out DVT. This study was negative it did not demonstrate any significant cause of the patient's pain and discomfort. Medical Decision Making   I am the first provider for this patient. I reviewed the vital signs, available nursing notes, past medical history, past surgical history, family history and social history. Vital Signs-Reviewed the patient's vital signs. Pulse Oximetry Analysis - 97% on RA      Records Reviewed: Nursing Notes and Old Medical Records    Provider Notes (Medical Decision Making):   DDx- DVT, fx, hematoma    ED Course:   Initial assessment performed. The patients presenting problems have been discussed, and they are in agreement with the care plan formulated and outlined with them. I have encouraged them to ask questions as they arise throughout their visit. Critical Care Time:   none    Disposition:  Dc home    PLAN:  1.    Discharge Medication List as of 8/6/2019 12:10 AM      START taking these medications    Details   oxyCODONE IR (ROXICODONE) 5 mg immediate release tablet Take 1 Tab by mouth every six (6) hours as needed for Pain for up to 3 days. Max Daily Amount: 20 mg., Print, Disp-12 Tab, R-0         CONTINUE these medications which have NOT CHANGED    Details   !! BYSTOLIC 10 mg tablet TAKE 2 TABLETS BY MOUTH ONCE DAILY, NormalPlease consider 90 day supplies to promote better adherenceDisp-60 Tab, R-6      !! chlorthalidone (HYGROTEN) 25 mg tablet TAKE 1 TABLET BY MOUTH ONCE DAILY, NormalPlease consider 90 day supplies to promote better adherenceDisp-30 Tab, R-6      OTHER,NON-FORMULARY, 1 Tab daily. OTC Leg Cramps, Historical Med      glipiZIDE SR (GLUCOTROL XL) 10 mg CR tablet Take 1 Tab by mouth daily. , Normal, Disp-90 Tab, R-3      SITagliptin-metFORMIN (JANUMET XR) 100-1,000 mg TM24 Take 1 Tab by mouth daily. , Normal, Disp-90 Tab, R-3      !! amLODIPine (NORVASC) 10 mg tablet Historical Med, R-12      !! lisinopril (PRINIVIL, ZESTRIL) 40 mg tablet Historical Med, R-3      !! atorvastatin (LIPITOR) 40 mg tablet Historical Med, R-12      JANUMET 50-1,000 mg per tablet Historical Med, R-6, ANGIE      !! BYSTOLIC 10 mg tablet TAKE 2 TABLETS BY MOUTH ONCE DAILY, Historical Med, R-6, ANGIE      glipiZIDE (GLUCOTROL) 10 mg tablet TAKE 1 TABLET BY MOUTH WITH BREAKFAST AND  TWO  TABLETS  WITH  DINNER, Historical Med, R-11      !! chlorthalidone (HYGROTEN) 25 mg tablet TAKE 1 TABLET BY MOUTH ONCE DAILY, Historical Med, R-6      potassium chloride SR (KLOR-CON 10) 10 mEq tablet Historical Med, R-12      !! famotidine (PEPCID) 20 mg tablet Take 20 mg by mouth two (2) times a day., Historical Med      !! cholecalciferol, vitamin D3, (VITAMIN D3) 2,000 unit tab Take  by mouth., Historical Med      !! potassium 99 mg tablet Take 99 mg by mouth daily. , Historical Med      vitamin E/quinine sulfate (LEG CRAMP TREATMENT PO) Take  by mouth., Historical Med      aspirin delayed-release 81 mg tablet Take  by mouth daily. , Historical Med      !! nitroglycerin (NITROSTAT) 0.4 mg SL tablet 0.4 mg by SubLINGual route every five (5) minutes as needed for Chest Pain. Up to 3 doses. , Historical Med      !! amLODIPine (NORVASC) 10 mg tablet TAKE ONE TABLET BY MOUTH ONCE DAILY, NormalPlease consider 90 day supplies to promote better adherenceDisp-30 Tab, R-12      !! atorvastatin (LIPITOR) 40 mg tablet TAKE ONE TABLET BY MOUTH ONCE DAILY, NormalPlease consider 90 day supplies to promote better adherenceDisp-30 Tab, R-12      !! lisinopril (PRINIVIL, ZESTRIL) 40 mg tablet TAKE 1 TABLET BY MOUTH ONCE DAILY FOR BLOOD PRESSURE AND  KIDNEY  PROTECTION, Normal, Disp-90 Tab, R-3      clotrimazole-betamethasone (LOTRISONE) 1-0.05 % lotion Apply to the effected area every morning, Normal, Disp-30 mL, R-1      insulin glargine (LANTUS SOLOSTAR U-100 INSULIN) 100 unit/mL (3 mL) inpn INJECT 9 UNITS SUB-Q ONCE DAILY, Normal, Disp-15 mL, R-11      potassium chloride (KLOR-CON) 10 mEq tablet Take 1 Tab by mouth daily. , NormalPlease consider 90 day supplies to promote better adherenceDisp-30 Tab, R-12      OTHER Take 1 Tab by mouth daily. HYLANDS LEG CRAMPS, Historical Med      !! potassium 99 mg tablet Take 99 mg by mouth daily. , Historical Med      white petrolatum (WHITE PETROLEUM JELLY) topical onitment Apply to the affected area every morning. To uses once the rash is gone., Normal, Disp-2270 g, R-3      glycerin-mineral oil-lanolin (EUCERIN PLUS) topical cream Apply twice daily as needed, Normal, Disp-1 Tube, R-3      !! cholecalciferol, vitamin D3, (VITAMIN D3) 2,000 unit tab Take  by mouth., Historical Med      acetaminophen (TYLENOL) 325 mg tablet Take  by mouth every four (4) hours as needed for Pain., Historical Med      diphenhydrAMINE (BENADRYL ALLERGY) 25 mg tablet Take 25 mg by mouth as needed for Sleep (for bee stings). , Historical Med      !! famotidine (PEPCID) 20 mg tablet Take 20 mg by mouth daily. Historical Med, 20 mg      !! nitroglycerin (NITROSTAT) 0.4 mg SL tablet by SubLINGual route every five (5) minutes as needed.   Historical Med aspirin 81 mg chewable tablet Take 81 mg by mouth daily. Historical Med, 81 mg       !! - Potential duplicate medications found. Please discuss with provider. 2.   Follow-up Information     Follow up With Specialties Details Why Contact Info    Edis Lane  [facility] [place] [address]  [facility]  [phone]          Return to ED if worse     Diagnosis     Clinical Impression:   1. Leg edema, right    2.  Leg pain, lateral, right

## 2019-08-12 ENCOUNTER — TELEPHONE (OUTPATIENT)
Dept: ENDOCRINOLOGY | Age: 64
End: 2019-08-12

## 2019-08-12 ENCOUNTER — OFFICE VISIT (OUTPATIENT)
Dept: FAMILY MEDICINE CLINIC | Age: 64
End: 2019-08-12

## 2019-08-12 VITALS
TEMPERATURE: 98.3 F | HEART RATE: 74 BPM | RESPIRATION RATE: 18 BRPM | OXYGEN SATURATION: 96 % | DIASTOLIC BLOOD PRESSURE: 71 MMHG | HEIGHT: 69 IN | WEIGHT: 192 LBS | BODY MASS INDEX: 28.44 KG/M2 | SYSTOLIC BLOOD PRESSURE: 112 MMHG

## 2019-08-12 DIAGNOSIS — Z79.899 ENCOUNTER FOR LONG-TERM (CURRENT) USE OF MEDICATIONS: ICD-10-CM

## 2019-08-12 DIAGNOSIS — Z86.73 HISTORY OF STROKE: ICD-10-CM

## 2019-08-12 DIAGNOSIS — B37.2 INTERTRIGINOUS CANDIDIASIS: ICD-10-CM

## 2019-08-12 DIAGNOSIS — I25.2 CORONARY ARTERY DISEASE WITH HISTORY OF MYOCARDIAL INFARCTION WITHOUT HISTORY OF CABG: ICD-10-CM

## 2019-08-12 DIAGNOSIS — Z72.0 TOBACCO ABUSE DISORDER: ICD-10-CM

## 2019-08-12 DIAGNOSIS — Z00.00 MEDICARE ANNUAL WELLNESS VISIT, SUBSEQUENT: Primary | ICD-10-CM

## 2019-08-12 DIAGNOSIS — I25.10 CORONARY ARTERY DISEASE WITH HISTORY OF MYOCARDIAL INFARCTION WITHOUT HISTORY OF CABG: ICD-10-CM

## 2019-08-12 DIAGNOSIS — E78.2 MIXED HYPERLIPIDEMIA: ICD-10-CM

## 2019-08-12 DIAGNOSIS — I10 ESSENTIAL HYPERTENSION: ICD-10-CM

## 2019-08-12 DIAGNOSIS — E11.9 TYPE 2 DIABETES MELLITUS WITHOUT COMPLICATION, WITHOUT LONG-TERM CURRENT USE OF INSULIN (HCC): ICD-10-CM

## 2019-08-12 RX ORDER — SITAGLIPTIN AND METFORMIN HYDROCHLORIDE 50; 1000 MG/1; MG/1
TABLET, FILM COATED ORAL
Qty: 180 TAB | Refills: 3 | Status: SHIPPED | OUTPATIENT
Start: 2019-08-12 | End: 2019-12-04

## 2019-08-12 RX ORDER — FLUCONAZOLE 150 MG/1
150 TABLET ORAL DAILY
Qty: 1 TAB | Refills: 0 | Status: SHIPPED | OUTPATIENT
Start: 2019-08-12 | End: 2019-08-13

## 2019-08-12 NOTE — PROGRESS NOTES
1. Have you been to the ER, urgent care clinic since your last visit? Hospitalized since your last visit? Yes[id number]    2. Have you seen or consulted any other health care providers outside of the [address] [place] [address] since your last visit? Include any pap smears or colon screening.  No

## 2019-08-12 NOTE — PROGRESS NOTES
This is a Lisa Exam (AWV)     I have reviewed the patient's medical history in detail and updated the computerized patient record. Marcella Rivero is a 71 y.o. male    2nd visit with this physician,     He lives alone currently. Daughter in [facility]     has a past medical history of Blind right eye (6/5/2019), CAD (coronary artery disease), COPD, Coronary artery disease with history of myocardial infarction without history of CABG (6/5/2019), CVA (cerebral infarction) (3/24/2012), Diabetes ([address] Essential hypertension (6/5/2019), Gastroesophageal reflux disease without esophagitis (6/5/2019), MI (myocardial infarction) ([address] Mixed hyperlipidemia (6/5/2019), Stroke ([address] Thromboembolism ([address] (6/5/2019), and Thromboembolus ([address] Intertrigo candidiasis: has mostly resolved, we'll do another 1 pill of diflucan    HTN: BP at goal   Continue amlodipine, bystolic, hygroten, Lisinopril, Potassium    HLD: Lipitor    COPD: breathing have been fine. He haven't need an inhaler for many months. Smoker 55 years, most of his life 2ppd. Injured his shin recently 1 week ago, went to ED, Negative X-ray and US for DVT. He sees cardiologist Wagner Hidalgo. CAD, MI, s/p CABG    Dr. Hall Revering endocrinologist.  DM2: A1C 11.1%   Lantus, Janumet, Glipizide SR    CVA: affected left side. And right eye blindness. Still right eye blindness. He lives [facility] about 4hrs away. Will see all of us at once when he can. He doesn't want Colonoscopy currently.  Previous Dr. Christinia Scheuermann      History     Past Medical History:   Diagnosis Date    Blind right eye 6/5/2019    CAD (coronary artery disease)     COPD     Coronary artery disease with history of myocardial infarction without history of CABG 6/5/2019    CVA (cerebral infarction) 3/24/2012    Diabetes ([address]     Essential hypertension 6/5/2019    Gastroesophageal reflux disease without esophagitis 6/5/2019    [facility]     Mixed hyperlipidemia 6/5/2019    Stroke [facility]     Thromboembolism ([facility] 6/5/2019    Thromboembolus ([facility]       Past Surgical History:   Procedure Laterality Date    CARDIAC SURG PROCEDURE UNLIST      cabg, stents    CARDIAC SURG PROCEDURE UNLIST      COMB R&L HEART CATH  3/24/2012         HX COLONOSCOPY      HX HERNIA REPAIR      HX OTHER SURGICAL      cyst removed from right chest    HX UROLOGICAL      kidney stone removed     Current Outpatient Medications   Medication Sig Dispense Refill    JANUMET 50-1,000 mg per tablet One in the morning and one with dinner 180 Tab 3    fluconazole (DIFLUCAN) 150 mg tablet Take 1 Tab by mouth daily for 1 day. FDA advises cautious prescribing of oral fluconazole in pregnancy. 1 Tab 0    BYSTOLIC 10 mg tablet TAKE 2 TABLETS BY MOUTH ONCE DAILY 60 Tab 6    chlorthalidone (HYGROTEN) 25 mg tablet TAKE 1 TABLET BY MOUTH ONCE DAILY 30 Tab 6    glipiZIDE SR (GLUCOTROL XL) 10 mg CR tablet Take 1 Tab by mouth daily. 90 Tab 3    atorvastatin (LIPITOR) 40 mg tablet   12    famotidine (PEPCID) 20 mg tablet Take 20 mg by mouth two (2) times a day.  amLODIPine (NORVASC) 10 mg tablet TAKE ONE TABLET BY MOUTH ONCE DAILY 30 Tab 12    atorvastatin (LIPITOR) 40 mg tablet TAKE ONE TABLET BY MOUTH ONCE DAILY 30 Tab 12    lisinopril (PRINIVIL, ZESTRIL) 40 mg tablet TAKE 1 TABLET BY MOUTH ONCE DAILY FOR BLOOD PRESSURE AND  KIDNEY  PROTECTION 90 Tab 3    clotrimazole-betamethasone (LOTRISONE) 1-0.05 % lotion Apply to the effected area every morning 30 mL 1    insulin glargine (LANTUS SOLOSTAR U-100 INSULIN) 100 unit/mL (3 mL) inpn INJECT 9 UNITS SUB-Q ONCE DAILY (Patient taking differently: INJECT 7 UNITS SUB-Q ONCE DAILY IN AM) 15 mL 11    potassium chloride (KLOR-CON) 10 mEq tablet Take 1 Tab by mouth daily. 30 Tab 12    white petrolatum (WHITE PETROLEUM JELLY) topical onitment Apply to the affected area every morning. To uses once the rash is gone.  2270 g 3    glycerin-mineral oil-lanolin (EUCERIN PLUS) topical cream Apply twice daily as needed 1 Tube 3    cholecalciferol, vitamin D3, (VITAMIN D3) 2,000 unit tab Take  by mouth.  acetaminophen (TYLENOL) 325 mg tablet Take  by mouth every four (4) hours as needed for Pain.  aspirin 81 mg chewable tablet Take 81 mg by mouth daily.  nitroglycerin (NITROSTAT) 0.4 mg SL tablet by SubLINGual route every five (5) minutes as needed.          No Known Allergies  Family History   Problem Relation Age of Onset    Heart Disease Mother     Coronary Artery Disease Father     Coronary Artery Disease Other         Mother and brother in their 52's     Social History     Tobacco Use    Smoking status: Current Every Day Smoker     Packs/day: 1.50     Years: 55.00     Pack years: 82.50     Types: Cigarettes    Smokeless tobacco: Never Used   Substance Use Topics    Alcohol use: Not Currently     Comment: occasionally     Patient Active Problem List   Diagnosis Code    Essential hypertension I10    Mixed hyperlipidemia E78.2    Gastroesophageal reflux disease without esophagitis K21.9    Blind right eye H54.40    Coronary artery disease with history of myocardial infarction without history of CABG I25.10, I25.2    Thromboembolism ([facility] I74.9    Encounter for long-term (current) use of medications Z79.899    NSTEMI (non-ST elevated myocardial infarction) ([address] I21.4    CAD (coronary artery disease) I25.10    Unstable angina pectoris ([address] I20.0    S/P PTCA (percutaneous transluminal coronary angioplasty) Z98.61    Hyperlipidemia with target low density lipoprotein (LDL) cholesterol less than 70 mg/dL E78.5    Tobacco abuse disorder Z72.0    Benign essential hypertension I10    Cardiomyopathy ([facility] I42.9    Chronic systolic heart failure (HCC) I50.22    History of noncompliance with medical treatment Z91.19    S/P CABG x 4 Z95.1    Acute, but ill-defined, cerebrovascular disease I67.89    S/P coronary artery stent placement Z95.5    Essential hypertension I10    Mixed hyperlipidemia E78.2    Type 2 diabetes mellitus with complication, with long-term current use of insulin ([facility]) E11.8, Z79.4    Type 2 diabetes with nephropathy ([facility] E11.21    History of stroke Z86.73         Depression Risk Factor Screening:     3 most recent PHQ Screens 6/5/2019   Little interest or pleasure in doing things Not at all   Feeling down, depressed, irritable, or hopeless Not at all   Total Score PHQ 2 0     Alcohol Risk Factor Screening: You do not drink alcohol or very rarely. Functional Ability and Level of Safety:     Hearing Loss   Hearing is good. Activities of Daily Living   Self-care. Requires assistance with: no ADLs    Fall Risk   No flowsheet data found. Abuse Screen   Patient is not abused    Review of Systems   A comprehensive review of systems was negative except for that written in the HPI. Physical Examination     Evaluation of Cognitive Function:  Mood/affect:  happy  Appearance: age appropriate, bearded and casually dressed  Family member/caregiver input: none present    Visit Vitals  /71 (BP 1 Location: Left arm, BP Patient Position: Sitting)   Pulse 74   Temp 98.3 °F (36.8 °C) (Oral)   Resp 18   Ht 5' 9\" (1.753 m)   Wt 192 lb (87.1 kg)   SpO2 96%   BMI 28.35 kg/m²     General appearance: alert, cooperative, no distress, appears stated age  Neurologic: Alert and oriented X 3, normal strength and tone, symmetric. Normal without focal findings. Cranial nerves 2-12 intact. Normal coordination and gait. Mental status: Alert, oriented, thought content appropriate, affect: stable, mood-congruent. Head: Normocephalic, without obvious abnormality, atraumatic  Eyes: conjunctivae/corneas clear.  Right eye blindness  Neck: supple, symmetrical, trachea midline, no JVD  Lungs: clear to auscultation bilaterally  Heart: regular rate and rhythm, S1, S2 normal, no murmur, click, rub or gallop  Abdomen: soft, non-tender. Extremities: extremities normal, atraumatic, no cyanosis or edema    Patient Care Team:  Maddie Kay MD as PCP - General (Family Practice)  Jovita Mccartney MD as Referring Provider (Cardiology)  Roseann Mayers MD as Consulting Provider (Endocrinology)    Advice/Referrals/Counseling   Education and counseling provided:  Are appropriate based on today's review and evaluation  End-of-Life planning (with patient's consent)  Pneumococcal Vaccine  Influenza Vaccine  Colorectal cancer screening tests  Medical nutrition therapy for individuals with diabetes or renal disease      Assessment/Plan     Diagnoses and all orders for this visit:    1. Medicare annual wellness visit, subsequent    2. Essential hypertension    3. Type 2 diabetes mellitus without complication, without long-term current use of insulin ([facility]    4. Mixed hyperlipidemia    5. Coronary artery disease with history of myocardial infarction without history of CABG    6. Encounter for long-term (current) use of medications    7. History of stroke    8. Tobacco abuse disorder    9. Intertriginous candidiasis  -     fluconazole (DIFLUCAN) 150 mg tablet; Take 1 Tab by mouth daily for 1 day. FDA advises cautious prescribing of oral fluconazole in pregnancy. .  Follow-up and Dispositions    · Return in about 4 months (around 12/12/2019) for HTN, HLD, meds, labs. Aria Hidalgo MD  8/12/2019.

## 2019-08-12 NOTE — TELEPHONE ENCOUNTER
Pt brought in his BG readings. He notes that when he was taking the Janumet XR his BGs were higher then when he was on the Janumet IR. Pt restarted the Janumet IR, I will change his prescription to reflect this change.

## 2019-12-04 ENCOUNTER — OFFICE VISIT (OUTPATIENT)
Dept: CARDIOLOGY CLINIC | Age: 64
End: 2019-12-04

## 2019-12-04 ENCOUNTER — HOSPITAL ENCOUNTER (OUTPATIENT)
Dept: LAB | Age: 64
Discharge: HOME | End: 2019-12-04
Payer: MEDICARE

## 2019-12-04 ENCOUNTER — OFFICE VISIT (OUTPATIENT)
Dept: FAMILY MEDICINE CLINIC | Age: 64
End: 2019-12-04

## 2019-12-04 ENCOUNTER — OFFICE VISIT (OUTPATIENT)
Dept: ENDOCRINOLOGY | Age: 64
End: 2019-12-04

## 2019-12-04 VITALS
SYSTOLIC BLOOD PRESSURE: 128 MMHG | BODY MASS INDEX: 29.68 KG/M2 | HEART RATE: 71 BPM | RESPIRATION RATE: 18 BRPM | DIASTOLIC BLOOD PRESSURE: 94 MMHG | HEIGHT: 69 IN | OXYGEN SATURATION: 96 % | WEIGHT: 200.4 LBS

## 2019-12-04 VITALS
DIASTOLIC BLOOD PRESSURE: 82 MMHG | BODY MASS INDEX: 29.47 KG/M2 | HEIGHT: 69 IN | HEART RATE: 66 BPM | SYSTOLIC BLOOD PRESSURE: 134 MMHG | WEIGHT: 199 LBS

## 2019-12-04 VITALS
HEART RATE: 62 BPM | WEIGHT: 198.8 LBS | HEIGHT: 69 IN | SYSTOLIC BLOOD PRESSURE: 112 MMHG | TEMPERATURE: 97 F | RESPIRATION RATE: 16 BRPM | DIASTOLIC BLOOD PRESSURE: 71 MMHG | BODY MASS INDEX: 29.44 KG/M2 | OXYGEN SATURATION: 97 %

## 2019-12-04 DIAGNOSIS — E78.2 MIXED HYPERLIPIDEMIA: ICD-10-CM

## 2019-12-04 DIAGNOSIS — Z79.4 TYPE 2 DIABETES MELLITUS WITH DIABETIC NEPHROPATHY, WITH LONG-TERM CURRENT USE OF INSULIN (HCC): Primary | ICD-10-CM

## 2019-12-04 DIAGNOSIS — I10 ESSENTIAL HYPERTENSION: ICD-10-CM

## 2019-12-04 DIAGNOSIS — I10 BENIGN ESSENTIAL HYPERTENSION: ICD-10-CM

## 2019-12-04 DIAGNOSIS — B37.2 INTERTRIGINOUS CANDIDIASIS: Primary | ICD-10-CM

## 2019-12-04 DIAGNOSIS — N52.9 ERECTILE DYSFUNCTION, UNSPECIFIED ERECTILE DYSFUNCTION TYPE: ICD-10-CM

## 2019-12-04 DIAGNOSIS — I25.10 CORONARY ARTERY DISEASE INVOLVING NATIVE CORONARY ARTERY OF NATIVE HEART WITHOUT ANGINA PECTORIS: Primary | ICD-10-CM

## 2019-12-04 DIAGNOSIS — E11.21 TYPE 2 DIABETES MELLITUS WITH DIABETIC NEPHROPATHY, WITH LONG-TERM CURRENT USE OF INSULIN (HCC): Primary | ICD-10-CM

## 2019-12-04 DIAGNOSIS — I50.22 CHRONIC SYSTOLIC HEART FAILURE (HCC): ICD-10-CM

## 2019-12-04 DIAGNOSIS — Z79.899 ENCOUNTER FOR LONG-TERM (CURRENT) USE OF MEDICATIONS: ICD-10-CM

## 2019-12-04 DIAGNOSIS — Z95.1 S/P CABG X 4: ICD-10-CM

## 2019-12-04 LAB — HBA1C MFR BLD HPLC: 8.4 %

## 2019-12-04 PROCEDURE — 80076 HEPATIC FUNCTION PANEL: CPT

## 2019-12-04 PROCEDURE — 80048 BASIC METABOLIC PNL TOTAL CA: CPT

## 2019-12-04 PROCEDURE — 36415 COLL VENOUS BLD VENIPUNCTURE: CPT

## 2019-12-04 RX ORDER — CHLORPHENIRAMINE MALEATE 4 MG
TABLET ORAL 2 TIMES DAILY
Qty: 28 G | Refills: 1 | Status: SHIPPED | OUTPATIENT
Start: 2019-12-04 | End: 2020-07-23

## 2019-12-04 RX ORDER — FLUCONAZOLE 150 MG/1
150 TABLET ORAL DAILY
Qty: 1 TAB | Refills: 0 | Status: SHIPPED | OUTPATIENT
Start: 2019-12-04 | End: 2019-12-05

## 2019-12-04 RX ORDER — SILDENAFIL 25 MG/1
25 TABLET, FILM COATED ORAL AS NEEDED
Qty: 30 TAB | Refills: 1 | Status: SHIPPED | OUTPATIENT
Start: 2019-12-04 | End: 2021-05-20 | Stop reason: SDUPTHER

## 2019-12-04 RX ORDER — NITROGLYCERIN 0.4 MG/1
0.4 TABLET SUBLINGUAL
Qty: 1 BOTTLE | Refills: 1 | Status: SHIPPED | OUTPATIENT
Start: 2019-12-04 | End: 2021-10-28

## 2019-12-04 NOTE — PROGRESS NOTES
Eladio Mayfield is a 71 y.o. male    3rd visit with this physician,     He lives [facility] about [place]hrs away. He'll try to schedule apt to all doctor at once. He lives alone currently. Daughter in [facility]      has a past medical history of Blind right eye (6/5/2019), CAD (coronary artery disease), COPD, Coronary artery disease with history of myocardial infarction without history of CABG (6/5/2019), CVA (cerebral infarction) (3/24/2012), Diabetes ([address] Essential hypertension (6/5/2019), Gastroesophageal reflux disease without esophagitis (6/5/2019), MI (myocardial infarction) [facility] Mixed hyperlipidemia (6/5/2019), Stroke ([facility] Thromboembolism ([facility] (6/5/2019), and Thromboembolus ([facility] Tinea cruris: It helped previously but recur. We'll do 1 diflucan again  Order clotrimazole    HTN: BP at goal              Continue amlodipine, bystolic, hygroten, Lisinopril, Potassium     HLD: Lipitor     COPD: breathing have been fine. He haven't need an inhaler for many months. Smoker 55 years, most of his life 2ppd. Doesn't want to quit    He sees cardiologist Ildefonso Perales. CAD, MI, s/p CABG     Dr. Linda Solorio endocrinologist.  DM2: A1C 8.4% 12/2019, 11.1%              Lantus, Janumet, Glipizide SR    CVA: Effected left side. Right eye blindness.                 He doesn't want Colonoscopy currently. Previous Dr. Elizabeth Hamilton      Reviewed: active problem list, medication list, allergies, notes from last encounter, lab results, other specialist notes    A comprehensive review of systems was negative except for that written in the HPI. No Known Allergies  Current Outpatient Medications on File Prior to Visit   Medication Sig Dispense Refill    SITagliptin-metFORMIN (JANUMET XR) 100-1,000 mg TM24 Take 1 Tab by mouth daily.  90 Tab 3    BYSTOLIC 10 mg tablet TAKE 2 TABLETS BY MOUTH ONCE DAILY 60 Tab 6    chlorthalidone (HYGROTEN) 25 mg tablet TAKE 1 TABLET BY MOUTH ONCE DAILY 30 Tab 6    glipiZIDE SR (GLUCOTROL XL) 10 mg CR tablet Take 1 Tab by mouth daily. 90 Tab 3    famotidine (PEPCID) 20 mg tablet Take 20 mg by mouth two (2) times a day.  amLODIPine (NORVASC) 10 mg tablet TAKE ONE TABLET BY MOUTH ONCE DAILY 30 Tab 12    atorvastatin (LIPITOR) 40 mg tablet TAKE ONE TABLET BY MOUTH ONCE DAILY 30 Tab 12    lisinopril (PRINIVIL, ZESTRIL) 40 mg tablet TAKE 1 TABLET BY MOUTH ONCE DAILY FOR BLOOD PRESSURE AND  KIDNEY  PROTECTION 90 Tab 3    insulin glargine (LANTUS SOLOSTAR U-100 INSULIN) 100 unit/mL (3 mL) inpn INJECT 9 UNITS SUB-Q ONCE DAILY (Patient taking differently: INJECT 9 UNITS SUB-Q ONCE DAILY IN AM) 15 mL 11    potassium chloride (KLOR-CON) 10 mEq tablet Take 1 Tab by mouth daily. 30 Tab 12    cholecalciferol, vitamin D3, (VITAMIN D3) 2,000 unit tab Take  by mouth daily.  acetaminophen (TYLENOL) 325 mg tablet Take  by mouth every four (4) hours as needed for Pain.  aspirin 81 mg chewable tablet Take 81 mg by mouth daily.  potassium 99 mg tablet Take 99 mg by mouth daily.  nitroglycerin (NITROSTAT) 0.4 mg SL tablet 1 Tab by SubLINGual route every five (5) minutes as needed for Chest Pain. 1 Bottle 1    clotrimazole-betamethasone (LOTRISONE) 1-0.05 % lotion Apply to the effected area every morning 30 mL 1    white petrolatum (WHITE PETROLEUM JELLY) topical onitment Apply to the affected area every morning. To uses once the rash is gone. 2270 g 3    glycerin-mineral oil-lanolin (EUCERIN PLUS) topical cream Apply twice daily as needed 1 Tube 3     No current facility-administered medications on file prior to visit.       Patient Active Problem List   Diagnosis Code    Essential hypertension I10    Mixed hyperlipidemia E78.2    Gastroesophageal reflux disease without esophagitis K21.9    Blind right eye H54.40    Thromboembolism ([facility] I74.9    Encounter for long-term (current) use of medications Z79.899    NSTEMI (non-ST elevated myocardial infarction) ([facility] I21.4    CAD (coronary artery disease) I25.10    Unstable angina pectoris (HCC) I20.0    S/P PTCA (percutaneous transluminal coronary angioplasty) Z98.61    Hyperlipidemia with target low density lipoprotein (LDL) cholesterol less than 70 mg/dL E78.5    Tobacco abuse disorder Z72.0    Benign essential hypertension I10    Cardiomyopathy ([facility] I42.9    Chronic systolic heart failure (HCC) I50.22    History of noncompliance with medical treatment Z91.19    S/P CABG x 4 Z95.1    Acute, but ill-defined, cerebrovascular disease I67.89    S/P coronary artery stent placement Z95.5    Essential hypertension I10    Mixed hyperlipidemia E78.2    Type 2 diabetes mellitus with complication, with long-term current use of insulin (HCC) E11.8, Z79.4    Type 2 diabetes with nephropathy ([facility]) E11.21    History of stroke Z86.73       Visit Vitals  /71   Pulse 62   Temp 97 °F (36.1 °C) (Oral)   Resp 16   Ht 5' 9\" (1.753 m)   Wt 198 lb 12.8 oz (90.2 kg)   SpO2 97%   BMI 29.36 kg/m²     General appearance: alert, cooperative, no distress, appears stated age  Neurologic: Alert and oriented X 3, normal strength and tone, symmetric. Normal without focal findings. Cranial nerves 2-12 intact. Normal coordination and gait. Mental status: Alert, oriented, thought content appropriate, affect: stable, mood-congruent. Head: Normocephalic, without obvious abnormality, atraumatic  Eyes: RIght eye blind. Left eye conjunctivae/corneas clear. PERRL, EOM's intact. Neck: supple, symmetrical, trachea midline, no JVD  Lungs: clear to auscultation bilaterally  Heart: regular rate and rhythm, S1, S2 normal, no murmur, click, rub or gallop  Abdomen: soft, non-tender. Extremities: extremities normal, atraumatic, no cyanosis or edema      Assessment/Plans:    Diagnoses and all orders for this visit:    1. Intertriginous candidiasis  -     clotrimazole (LOTRIMIN) 1 % topical cream; Apply  to affected area two (2) times a day.   - fluconazole (DIFLUCAN) 150 mg tablet; Take 1 Tab by mouth daily for 1 day. FDA advises cautious prescribing of oral fluconazole in pregnancy. 2. Essential hypertension  -     METABOLIC PANEL, BASIC  -     HEPATIC FUNCTION PANEL    3. Mixed hyperlipidemia  -     METABOLIC PANEL, BASIC  -     HEPATIC FUNCTION PANEL    4. Encounter for long-term (current) use of medications  -     METABOLIC PANEL, BASIC  -     HEPATIC FUNCTION PANEL    5. Erectile dysfunction, unspecified erectile dysfunction type  -     sildenafil citrate (VIAGRA) 25 mg tablet; Take 1 Tab by mouth as needed (ED). Discussed plans, risk/benefits of treatments/observations. Through the use of shared decision making, above plans were agreed upon. Medication compliance advised. Patient verbalized understanding. Follow-up and Dispositions    · Return in about 6 months (around 6/4/2020) for HTN, HLD.          Shelby Vilchis MD  12/5/2019

## 2019-12-04 NOTE — PROGRESS NOTES
1. Have you been to the ER, urgent care clinic since your last visit? Hospitalized since your last visit? No.    2. Have you seen or consulted any other health care providers outside of the [address] [place] [address] since your last visit? Include any pap smears or colon screening.    No.      Chief Complaint   Patient presents with    Follow-up     6 month- pt denies any cardiac symptoms

## 2019-12-04 NOTE — PROGRESS NOTES
Chief Complaint   Patient presents with    Hypertension    Cholesterol Problem     4 mo check    1. Have you been to the ER, urgent care clinic since your last visit? Hospitalized since your last visit? no    2. Have you seen or consulted any other health care providers outside of the [address] [place] [address] since your last visit? Include any pap smears or colon screening.  yes

## 2019-12-04 NOTE — PATIENT INSTRUCTIONS
1) Continue your Lantus 9 units every day. 3) Continue the Glipizide ER 10mg every morning and Janumet 100/1000mg, one pill every morning.

## 2019-12-04 NOTE — PROGRESS NOTES
Alejandra Rosa, [facility]    Subjective/HPI:     Mr. Netta Reddy is a 71 y.o. male is here for routine f/u. He has a PMHx of CAD s/p CABGx4, DM2, CVA, HTN and HLD. He is doing well. He denies complaints of chest pains, dizziness, orthopnea, PND or edema. He denies shortness of breath or palpitation symptoms. He still smokes. He travels from [place], [place] to get here, about 3 hour drive one way.          PCP Provider  Jose Maria Brown MD  Past Medical History:   Diagnosis Date    Blind right eye 6/5/2019    CAD (coronary artery disease)     COPD     Coronary artery disease with history of myocardial infarction without history of CABG 6/5/2019    CVA (cerebral infarction) 3/24/2012    Diabetes ([address]     Essential hypertension 6/5/2019    Gastroesophageal reflux disease without esophagitis 6/5/2019    MI (myocardial infarction) ([address]     Mixed hyperlipidemia 6/5/2019    Stroke ([address]     Thromboembolism ([address] 6/5/2019    Thromboembolus ([address]       Past Surgical History:   Procedure Laterality Date    CARDIAC SURG PROCEDURE UNLIST      cabg, stents    CARDIAC SURG PROCEDURE UNLIST      COMB R&L HEART CATH  3/24/2012         HX COLONOSCOPY      HX HERNIA REPAIR      HX OTHER SURGICAL      cyst removed from right chest    HX UROLOGICAL      kidney stone removed     Family History   Problem Relation Age of Onset    Heart Disease Mother     Coronary Artery Disease Father     Coronary Artery Disease Other         Mother and brother in their 52's     Social History     Socioeconomic History    Marital status: SINGLE     Spouse name: Not on file    Number of children: Not on file    Years of education: Not on file    Highest education level: Not on file   Occupational History    Not on file   Social Needs    Financial resource strain: Not on file    Food insecurity:     Worry: Not on file     Inability: Not on file    Transportation needs:     Medical: Not on file Non-medical: Not on file   Tobacco Use    Smoking status: Current Every Day Smoker     Packs/day: 1.50     Years: 55.00     Pack years: 82.50     Types: Cigarettes    Smokeless tobacco: Never Used   Substance and Sexual Activity    Alcohol use: Not Currently    Drug use: Never    Sexual activity: Not on file   Lifestyle    Physical activity:     Days per week: Not on file     Minutes per session: Not on file    Stress: Not on file   Relationships    Social connections:     Talks on phone: Not on file     Gets together: Not on file     Attends Jain service: Not on file     Active member of club or organization: Not on file     Attends meetings of clubs or organizations: Not on file     Relationship status: Not on file    Intimate partner violence:     Fear of current or ex partner: Not on file     Emotionally abused: Not on file     Physically abused: Not on file     Forced sexual activity: Not on file   Other Topics Concern    Not on file   Social History Narrative    ** Merged History Encounter **            No Known Allergies     Current Outpatient Medications   Medication Sig    potassium 99 mg tablet Take 99 mg by mouth daily.  JANUMET 50-1,000 mg per tablet One in the morning and one with dinner (Patient taking differently: Take 1 Tab by mouth daily (with breakfast). 100-1000 MG TAB 1 TAB DAILY)    BYSTOLIC 10 mg tablet TAKE 2 TABLETS BY MOUTH ONCE DAILY    chlorthalidone (HYGROTEN) 25 mg tablet TAKE 1 TABLET BY MOUTH ONCE DAILY    glipiZIDE SR (GLUCOTROL XL) 10 mg CR tablet Take 1 Tab by mouth daily.  famotidine (PEPCID) 20 mg tablet Take 20 mg by mouth two (2) times a day.     amLODIPine (NORVASC) 10 mg tablet TAKE ONE TABLET BY MOUTH ONCE DAILY    atorvastatin (LIPITOR) 40 mg tablet TAKE ONE TABLET BY MOUTH ONCE DAILY    lisinopril (PRINIVIL, ZESTRIL) 40 mg tablet TAKE 1 TABLET BY MOUTH ONCE DAILY FOR BLOOD PRESSURE AND  KIDNEY  PROTECTION    clotrimazole-betamethasone (Patricia Leap) 1-0.05 % lotion Apply to the effected area every morning    insulin glargine (LANTUS SOLOSTAR U-100 INSULIN) 100 unit/mL (3 mL) inpn INJECT 9 UNITS SUB-Q ONCE DAILY (Patient taking differently: INJECT 9 UNITS SUB-Q ONCE DAILY IN AM)    potassium chloride (KLOR-CON) 10 mEq tablet Take 1 Tab by mouth daily.  white petrolatum (WHITE PETROLEUM JELLY) topical onitment Apply to the affected area every morning. To uses once the rash is gone.  glycerin-mineral oil-lanolin (EUCERIN PLUS) topical cream Apply twice daily as needed    cholecalciferol, vitamin D3, (VITAMIN D3) 2,000 unit tab Take  by mouth daily.  acetaminophen (TYLENOL) 325 mg tablet Take  by mouth every four (4) hours as needed for Pain.  nitroglycerin (NITROSTAT) 0.4 mg SL tablet by SubLINGual route every five (5) minutes as needed.  aspirin 81 mg chewable tablet Take 81 mg by mouth daily. No current facility-administered medications for this visit. I have reviewed the problem list, allergy list, medical history, family, social history and medications. Review of Symptoms:    Review of Systems   Constitutional: Negative for chills, fever and weight loss. HENT: Negative for nosebleeds. Eyes: Negative for blurred vision and double vision. Respiratory: Negative for cough, shortness of breath and wheezing. Cardiovascular: Negative for chest pain, palpitations, orthopnea, leg swelling and PND. Gastrointestinal: Negative for abdominal pain, blood in stool, diarrhea, nausea and vomiting. Musculoskeletal: Negative for joint pain. Skin: Negative for rash. Neurological: Negative for dizziness, tingling and loss of consciousness. Endo/Heme/Allergies: Does not bruise/bleed easily. Physical Exam:      General: Well developed, in no acute distress, cooperative and alert  HEENT: No carotid bruits, no JVD, trach is midline. Neck Supple, PEERL, EOM intact.   Heart:  reg rate and rhythm; normal S1/S2; no murmurs, gallops or rubs. Respiratory: Clear bilaterally x 4, no wheezing or rales  Abdomen:   Soft, non-tender, no distention, no masses. + BS. Extremities:  Normal cap refill, no cyanosis, atraumatic. No edema. Neuro: A&Ox3, speech clear, gait stable. Skin: Skin color is normal. No rashes or lesions.  Non diaphoretic  Vascular: 2+ pulses symmetric in all extremities    Vitals:    12/04/19 0910 12/04/19 0930   BP: 122/90 (!) 128/94   Pulse: 71    Resp: 18    SpO2: 96%    Weight: 200 lb 6.4 oz (90.9 kg)    Height: 5' 9\" (1.753 m)        Cardiographics    ECG: sinus rhythm  Results for orders placed or performed during the hospital encounter of 09/20/12   EKG, 12 LEAD, INITIAL   Result Value Ref Range    Ventricular Rate 93 BPM    Atrial Rate 93 BPM    P-R Interval 176 ms    QRS Duration 88 ms    Q-T Interval 338 ms    QTC Calculation (Bezet) 420 ms    Calculated P Axis 6 degrees    Calculated R Axis 13 degrees    Calculated T Axis 125 degrees    Diagnosis       Normal sinus rhythm  ST & T wave abnormality, consider lateral ischemia  When compared with ECG of 20-SEP-2012 12:54,  Nonspecific T wave abnormality has replaced inverted T waves in Anterior   leads  Confirmed by Erick Perez, P.VAndrew ([id number]) on 9/21/2012 8:29:51 AM       Cardiology Labs:  Lab Results   Component Value Date/Time    Cholesterol, total 116 06/05/2019 02:51 PM    HDL Cholesterol 27 (L) 06/05/2019 02:51 PM    LDL, calculated 49 06/05/2019 02:51 PM    Triglyceride 201 (H) 06/05/2019 02:51 PM    CHOL/HDL Ratio 5.2 (H) 09/21/2012 04:35 AM       Lab Results   Component Value Date/Time    Sodium 138 06/05/2019 02:51 PM    Potassium 3.9 06/05/2019 02:51 PM    Chloride 98 06/05/2019 02:51 PM    CO2 26 06/05/2019 02:51 PM    Anion gap 7 09/21/2012 04:35 AM    Glucose 182 (H) 06/05/2019 02:51 PM    BUN 17 06/05/2019 02:51 PM    Creatinine 1.21 06/05/2019 02:51 PM    BUN/Creatinine ratio 14 06/05/2019 02:51 PM    GFR est AA 73 06/05/2019 02:51 PM    GFR est non-AA 63 06/05/2019 02:51 PM    Calcium 9.7 06/05/2019 02:51 PM    Bilirubin, total 0.6 06/05/2019 02:51 PM    AST (SGOT) 14 06/05/2019 02:51 PM    Alk. phosphatase 73 06/05/2019 02:51 PM    Protein, total 7.0 06/05/2019 02:51 PM    Albumin 4.3 06/05/2019 02:51 PM    Globulin 2.4 05/23/2012 04:05 AM    A-G Ratio 1.6 06/05/2019 02:51 PM    ALT (SGPT) 21 06/05/2019 02:51 PM           Assessment:     Assessment:       ICD-10-CM ICD-9-CM    1. Coronary artery disease involving native coronary artery of native heart without angina pectoris I25.10 414.01    2. Chronic systolic heart failure (HCC) I50.22 428.22 AMB POC EKG ROUTINE W/ 12 LEADS, INTER & REP   3. Benign essential hypertension I10 401.1 AMB POC EKG ROUTINE W/ 12 LEADS, INTER & REP   4. Mixed hyperlipidemia E78.2 272.2    5. S/P CABG x 4 Z95.1 V45.81         Plan:     1. Coronary artery disease involving native coronary artery of native heart without angina pectoris  S/p CABGx4 with subsequent ARETHA to LCx in 9/2012  Without anginal or anginal equivalent symptoms  Continue BB, ASA and statin therapy    2. Chronic systolic heart failure ([address]  Echo in 5/2016 with reduced LVEF 40-45%, unchanged from previous study in 2014  Will repeat echo to assess EF  Euvolemic on exam today  Continue with BB, lisinopril    3. Benign essential hypertension  BP controlled. Continue anti-hypertensive therapy and low sodium diet    4. Mixed hyperlipidemia  LDL 49 in 6/2019  Continue statin therapy and low fat, low cholesterol diet  Lipids managed by PCP    5. S/P CABG x 4  S/p off pump CABG with VG to OM1, seq VG to PDA and RAY and LIMA to LAD in 5/2012    F/u with Dr. Kennedy Ireland in 6 months    Tong Hampton NP       [facility]    12/4/2019         Patient seen, examined by me personally. Plan discussed as detailed. Agree with note as outlined by  NP. I confirm findings in history and physical exam. No additional findings noted. Agree with plan as outlined above.  Echo next visit.     Julita Davalos MD

## 2019-12-04 NOTE — PROGRESS NOTES
Chief Complaint   Patient presents with    Diabetes     pcp and pharmacy verified   Records since his last visit reviewed. History of Present Illness: Ramses Banuelos is a 71 y.o. male here for follow up of diabetes. At his last visit in June 2019 his A1C had increased to 11.0% on Janumet 50/1000mg in the AM only,  glipizide 10mg with breakfast only and Lantus 7 units daily. He notes that he was forgetting his dinner dose of Janumet and Glipizide. Pt was instructed to increase Lantus back to 9 units daily and I changed his Glipizide to Glipizide ER 10mg daily and will change the Janumet to Janumet XR to 100/1000mg daliy. In September he called and asked that we change the Janumet back to IR, because his BGs were running higher on the XR. I instructed him to take it twice per day. When we increased his Glipizide above 10mg BID he was having leg cramps, when we increased his Lantus above 9 units daily he had episodes of hypoglycemia. His A1C today was 8.4%. Pt is currently taking Glipizde ER 10mg in the AM only and Janumet /1000mg in the AM only and Lantus 9 units in the morning. He has not had any low blood sugars  He is not checking his blood sugars. He denies issues of CP, SOB, polyuria and polydipsia. Pt is eating 3 meals daily. His largest meal is Dinner. He has breakfast between 8-9AM, today he had two sausage biscuits and coffee (splenda). He will have lunch around Noon-1PM, yesterday he had a suazo sandwich and coffee   Last night for dinner he had a chinese buffet (a little of everything). He is followed by Dr. Maral Fung of cardiology. He saw Dr. Maral Fung today Walter Gutierrez said everything was excellent\". He will have an ECHO in June 2020. He stays busy chopping wood and yard work and work around [employer]. 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 No history of retinopathy, neuropathy, but has some nephropathy. Last eye exam was April 2018, no retinopathy. Current Outpatient Medications   Medication Sig    potassium 99 mg tablet Take 99 mg by mouth daily.  nitroglycerin (NITROSTAT) 0.4 mg SL tablet 1 Tab by SubLINGual route every five (5) minutes as needed for Chest Pain.  SITagliptin-metFORMIN (JANUMET XR) 100-1,000 mg TM24 Take 1 Tab by mouth daily.  BYSTOLIC 10 mg tablet TAKE 2 TABLETS BY MOUTH ONCE DAILY    chlorthalidone (HYGROTEN) 25 mg tablet TAKE 1 TABLET BY MOUTH ONCE DAILY    glipiZIDE SR (GLUCOTROL XL) 10 mg CR tablet Take 1 Tab by mouth daily.  famotidine (PEPCID) 20 mg tablet Take 20 mg by mouth two (2) times a day.  amLODIPine (NORVASC) 10 mg tablet TAKE ONE TABLET BY MOUTH ONCE DAILY    atorvastatin (LIPITOR) 40 mg tablet TAKE ONE TABLET BY MOUTH ONCE DAILY    lisinopril (PRINIVIL, ZESTRIL) 40 mg tablet TAKE 1 TABLET BY MOUTH ONCE DAILY FOR BLOOD PRESSURE AND  KIDNEY  PROTECTION    clotrimazole-betamethasone (LOTRISONE) 1-0.05 % lotion Apply to the effected area every morning    insulin glargine (LANTUS SOLOSTAR U-100 INSULIN) 100 unit/mL (3 mL) inpn INJECT 9 UNITS SUB-Q ONCE DAILY (Patient taking differently: INJECT 9 UNITS SUB-Q ONCE DAILY IN AM)    potassium chloride (KLOR-CON) 10 mEq tablet Take 1 Tab by mouth daily.  white petrolatum (WHITE PETROLEUM JELLY) topical onitment Apply to the affected area every morning. To uses once the rash is gone.  glycerin-mineral oil-lanolin (EUCERIN PLUS) topical cream Apply twice daily as needed    cholecalciferol, vitamin D3, (VITAMIN D3) 2,000 unit tab Take  by mouth daily.  acetaminophen (TYLENOL) 325 mg tablet Take  by mouth every four (4) hours as needed for Pain.  aspirin 81 mg chewable tablet Take 81 mg by mouth daily. No current facility-administered medications for this visit.       No Known Allergies  Review of Systems:  - Eyes: no blurry vision or double vision  - Cardiovascular: no chest pain  - Respiratory: no shortness of breath  - Musculoskeletal: no myalgias  - Neurological: no numbness/tingling in extremities    Physical Examination:  Blood pressure 134/82, pulse 66, height 5' 9\" (1.753 m), weight 199 lb (90.3 kg). - General: pleasant, no distress, good eye contact   - Neck: no carotid bruits  - Cardiovascular: regular, normal rate, nl s1 and s2, no m/r/g, 2+ DP pulses   - Respiratory: clear bilaterally  - Integumentary: no edema, no foot ulcers,   - Psychiatric: normal mood and affect    Diabetic foot exam:     Left Foot:   Visual Exam: callous - present   Pulse DP: 2+ (normal)   Filament test: normal sensation    Vibratory sensation: normal      Right Foot:   Visual Exam: callous - present   Pulse DP: 2+ (normal)   Filament test: normal sensation    Vibratory sensation: normal        Data Reviewed:   His A1C today was 8.4%    Assessment/Plan:   1) DM > His A1C today was 8.4%. His sugars are much improved on this regimen. Pt instructed to continue the Lantus 9 units daily, Glipizide ER 10mg daily and Janumet XR to 100/1000mg daliy  His Goal A1C is 8%, or less with no hypoglycemia. Pt to check his blood sugars twice per day. 2) HTN > His BP is at goal and is on an ACEI for renal protection      Pt voices understanding and agreement with the plan. RTC 6 months    Follow-up and Dispositions    · Return in about 6 months (around 6/4/2020).          Copy sent to:  Dr. Valerie Cannon

## 2019-12-05 LAB
ALBUMIN SERPL-MCNC: 4.6 G/DL (ref 3.6–4.8)
ALP SERPL-CCNC: 81 IU/L (ref 39–117)
ALT SERPL-CCNC: 25 IU/L (ref 0–44)
AST SERPL-CCNC: 13 IU/L (ref 0–40)
BILIRUB DIRECT SERPL-MCNC: 0.12 MG/DL (ref 0–0.4)
BILIRUB SERPL-MCNC: 0.4 MG/DL (ref 0–1.2)
BUN SERPL-MCNC: 19 MG/DL (ref 8–27)
BUN/CREAT SERPL: 19 (ref 10–24)
CALCIUM SERPL-MCNC: 10 MG/DL (ref 8.6–10.2)
CHLORIDE SERPL-SCNC: 97 MMOL/L (ref 96–106)
CO2 SERPL-SCNC: 25 MMOL/L (ref 20–29)
CREAT SERPL-MCNC: 1 MG/DL (ref 0.76–1.27)
GLUCOSE SERPL-MCNC: 224 MG/DL (ref 65–99)
POTASSIUM SERPL-SCNC: 3.9 MMOL/L (ref 3.5–5.2)
PROT SERPL-MCNC: 6.7 G/DL (ref 6–8.5)
SODIUM SERPL-SCNC: 138 MMOL/L (ref 134–144)

## 2019-12-26 RX ORDER — POTASSIUM CHLORIDE 750 MG/1
TABLET, FILM COATED, EXTENDED RELEASE ORAL
Qty: 30 TAB | Refills: 0 | Status: SHIPPED | OUTPATIENT
Start: 2019-12-26 | End: 2020-01-28

## 2020-01-28 RX ORDER — POTASSIUM CHLORIDE 750 MG/1
TABLET, FILM COATED, EXTENDED RELEASE ORAL
Qty: 30 TAB | Refills: 0 | Status: SHIPPED | OUTPATIENT
Start: 2020-01-28 | End: 2020-02-26

## 2020-02-26 RX ORDER — POTASSIUM CHLORIDE 750 MG/1
TABLET, FILM COATED, EXTENDED RELEASE ORAL
Qty: 30 TAB | Refills: 0 | Status: SHIPPED | OUTPATIENT
Start: 2020-02-26 | End: 2020-03-31

## 2020-02-26 RX ORDER — ATORVASTATIN CALCIUM 40 MG/1
TABLET, FILM COATED ORAL
Qty: 30 TAB | Refills: 0 | Status: SHIPPED | OUTPATIENT
Start: 2020-02-26 | End: 2020-03-31

## 2020-02-26 RX ORDER — AMLODIPINE BESYLATE 10 MG/1
TABLET ORAL
Qty: 30 TAB | Refills: 0 | Status: SHIPPED | OUTPATIENT
Start: 2020-02-26 | End: 2020-03-31

## 2020-03-31 RX ORDER — POTASSIUM CHLORIDE 750 MG/1
TABLET, FILM COATED, EXTENDED RELEASE ORAL
Qty: 30 TAB | Refills: 0 | Status: SHIPPED | OUTPATIENT
Start: 2020-03-31 | End: 2020-05-05

## 2020-06-08 ENCOUNTER — VIRTUAL VISIT (OUTPATIENT)
Dept: ENDOCRINOLOGY | Age: 65
End: 2020-06-08

## 2020-06-08 DIAGNOSIS — E11.21 TYPE 2 DIABETES MELLITUS WITH DIABETIC NEPHROPATHY, WITH LONG-TERM CURRENT USE OF INSULIN (HCC): Primary | ICD-10-CM

## 2020-06-08 DIAGNOSIS — I10 ESSENTIAL HYPERTENSION: ICD-10-CM

## 2020-06-08 DIAGNOSIS — Z79.4 TYPE 2 DIABETES MELLITUS WITH DIABETIC NEPHROPATHY, WITH LONG-TERM CURRENT USE OF INSULIN (HCC): Primary | ICD-10-CM

## 2020-06-08 DIAGNOSIS — E78.2 MIXED HYPERLIPIDEMIA: ICD-10-CM

## 2020-06-08 NOTE — PROGRESS NOTES
Chief Complaint   Patient presents with    Diabetes     pcp and pharmacy confirmed   Records since his last visit reviewed. **THIS IS A VIRTUAL VISIT VIA A TELEPHONE ENCOUNTER. PATIENT AGREED TO HAVE THEIR CARE DELIVERED OVER THE PHONE IN PLACE OF THEIR REGULARLY SCHEDULED OFFICE VISIT**        History of Present Illness: Corwin Powers is a 71 y.o. male here for follow up of diabetes. Pt is currently taking Glipizde ER 10mg in the AM only and Janumet /1000mg in the AM only and Lantus 9 units in the morning. Pt is not checking his BGs, but he has not been having issues of hypoglycemia. He denies issues of CP, SOB, polyuria and polydipsia. No recent illnesses, injuries or hospitalizations. Pt is eating 3 meals daily. His largest meal is Dinner. He has breakfast between 8-9AM, today he had suazo and eggs and coffee (splenda). He will have lunch around Noon-1PM, today he had two tacos and coffee   Last night for dinner he had spaghetti, tomatoes, green beans and a roll. He is followed by Dr. Itz Gilliland of cardiology. He saw Dr. Itz Gilliland today Maureen Letters said everything was excellent\". He will have an ECHO later this week. He stays busy chopping wood and yard work and work around [employer]. He notes he has been walking around in his yard as well. \"I think I have been working harder now that I am retired\". Pt notes he has some weakness in his left leg secondary to his prior CVA. 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 No history of retinopathy, neuropathy, but has some nephropathy. Last eye exam was April 2018, no retinopathy.     Current Outpatient Medications   Medication Sig    potassium chloride SR (KLOR-CON 10) 10 mEq tablet Take 1 tablet by mouth once daily    atorvastatin (LIPITOR) 40 mg tablet Take 1 tablet by mouth once daily    amLODIPine (NORVASC) 10 mg tablet Take 1 tablet by mouth once daily    BYSTOLIC 10 mg tablet TAKE 2 TABLETS BY MOUTH ONCE DAILY    lisinopril (PRINIVIL, ZESTRIL) 40 mg tablet TAKE 1 TABLET BY MOUTH ONCE DAILY FOR BLOOD PRESSURE AND  KIDNEY  PROTECTION    chlorthalidone (HYGROTEN) 25 mg tablet TAKE 1 TABLET BY MOUTH ONCE DAILY    insulin glargine (LANTUS SOLOSTAR U-100 INSULIN) 100 unit/mL (3 mL) inpn INJECT 9 UNITS SUBCUTANEOUSLY ONCE DAILY    nitroglycerin (NITROSTAT) 0.4 mg SL tablet 1 Tab by SubLINGual route every five (5) minutes as needed for Chest Pain.  SITagliptin-metFORMIN (JANUMET XR) 100-1,000 mg TM24 Take 1 Tab by mouth daily.  clotrimazole (LOTRIMIN) 1 % topical cream Apply  to affected area two (2) times a day.  sildenafil citrate (VIAGRA) 25 mg tablet Take 1 Tab by mouth as needed (ED).  glipiZIDE SR (GLUCOTROL XL) 10 mg CR tablet Take 1 Tab by mouth daily.  famotidine (PEPCID) 20 mg tablet Take 20 mg by mouth two (2) times a day.  clotrimazole-betamethasone (LOTRISONE) 1-0.05 % lotion Apply to the effected area every morning    white petrolatum (WHITE PETROLEUM JELLY) topical onitment Apply to the affected area every morning. To uses once the rash is gone.  glycerin-mineral oil-lanolin (EUCERIN PLUS) topical cream Apply twice daily as needed    cholecalciferol, vitamin D3, (VITAMIN D3) 2,000 unit tab Take  by mouth daily.  acetaminophen (TYLENOL) 325 mg tablet Take  by mouth every four (4) hours as needed for Pain.  aspirin 81 mg chewable tablet Take 81 mg by mouth daily. No current facility-administered medications for this visit. No Known Allergies  Review of Systems:  - Eyes: no blurry vision or double vision  - Cardiovascular: no chest pain  - Respiratory: no shortness of breath  - Musculoskeletal: no myalgias  - Neurological: no numbness/tingling in extremities    Physical Examination:  There were no vitals taken for this visit.   None        Data Reviewed:   None    Assessment/Plan:   1) DM > Pt is not checking his BGs, he is also not having any issues of low BGs. His goal A1C is under 8.0%. For now pt to continue the Lantus 9 units daily, Glipizide ER 10mg daily and Janumet XR to 100/1000mg daliy. When we tried him on Lantus 10 units per day he had some hypoglycemic episodes. Will order an A1C, lipid panel, CMP and urine MA. Pt to check his blood sugars twice per day. 2) HTN > He is on an ACEI for renal protection      Pt voices understanding and agreement with the plan. RTC 6 months    We spent 12 minutes of total time together during this virtual visit with a telephone encounter. All questions were answered and a copy of the instructions were sent to her via [email or web address] letter.          Copy sent to:  Dr. Emily Casarez

## 2020-06-11 LAB
ALBUMIN SERPL-MCNC: 4.1 G/DL (ref 3.8–4.8)
ALBUMIN/CREAT UR: 89 MG/G CREAT (ref 0–29)
ALBUMIN/GLOB SERPL: 1.8 {RATIO} (ref 1.2–2.2)
ALP SERPL-CCNC: 85 IU/L (ref 39–117)
ALT SERPL-CCNC: 21 IU/L (ref 0–44)
AST SERPL-CCNC: 12 IU/L (ref 0–40)
BILIRUB SERPL-MCNC: 0.6 MG/DL (ref 0–1.2)
BUN SERPL-MCNC: 19 MG/DL (ref 8–27)
BUN/CREAT SERPL: 17 (ref 10–24)
CALCIUM SERPL-MCNC: 9.7 MG/DL (ref 8.6–10.2)
CHLORIDE SERPL-SCNC: 94 MMOL/L (ref 96–106)
CHOLEST SERPL-MCNC: 129 MG/DL (ref 100–199)
CO2 SERPL-SCNC: 23 MMOL/L (ref 20–29)
CREAT SERPL-MCNC: 1.14 MG/DL (ref 0.76–1.27)
CREAT UR-MCNC: 93.2 MG/DL
EST. AVERAGE GLUCOSE BLD GHB EST-MCNC: 280 MG/DL
GLOBULIN SER CALC-MCNC: 2.3 G/DL (ref 1.5–4.5)
GLUCOSE SERPL-MCNC: 369 MG/DL (ref 65–99)
HBA1C MFR BLD: 11.4 % (ref 4.8–5.6)
HDLC SERPL-MCNC: 26 MG/DL
INTERPRETATION, 910389: NORMAL
LDLC SERPL CALC-MCNC: 44 MG/DL (ref 0–99)
Lab: NORMAL
MICROALBUMIN UR-MCNC: 82.9 UG/ML
POTASSIUM SERPL-SCNC: 3.8 MMOL/L (ref 3.5–5.2)
PROT SERPL-MCNC: 6.4 G/DL (ref 6–8.5)
SODIUM SERPL-SCNC: 133 MMOL/L (ref 134–144)
TRIGL SERPL-MCNC: 296 MG/DL (ref 0–149)
VLDLC SERPL CALC-MCNC: 59 MG/DL (ref 5–40)

## 2020-06-15 ENCOUNTER — TELEPHONE (OUTPATIENT)
Dept: ENDOCRINOLOGY | Age: 65
End: 2020-06-15

## 2020-06-16 NOTE — PROGRESS NOTES
Spoke with pt. I instructed him to increase his Lantus to 10 units and to cut down on the carb intake. Pt voices understanding and agreement with the plan.

## 2020-06-16 NOTE — TELEPHONE ENCOUNTER
Called pt regarding his A1C, which was 11.4%. Pt instructed to increase his Lantus to 10 units and cut down on the carb intake. Pt voices understanding and agreement with the plan.

## 2020-06-26 ENCOUNTER — VIRTUAL VISIT (OUTPATIENT)
Dept: FAMILY MEDICINE CLINIC | Age: 65
End: 2020-06-26

## 2020-06-26 DIAGNOSIS — B37.2 INTERTRIGINOUS CANDIDIASIS: ICD-10-CM

## 2020-06-26 DIAGNOSIS — Z79.899 ENCOUNTER FOR LONG-TERM (CURRENT) USE OF MEDICATIONS: ICD-10-CM

## 2020-06-26 DIAGNOSIS — I10 ESSENTIAL HYPERTENSION: Primary | ICD-10-CM

## 2020-06-26 DIAGNOSIS — J44.9 CHRONIC OBSTRUCTIVE PULMONARY DISEASE, UNSPECIFIED COPD TYPE (HCC): ICD-10-CM

## 2020-06-26 DIAGNOSIS — E78.2 MIXED HYPERLIPIDEMIA: ICD-10-CM

## 2020-06-26 DIAGNOSIS — Z72.0 TOBACCO ABUSE DISORDER: ICD-10-CM

## 2020-06-26 RX ORDER — CHLORTHALIDONE 25 MG/1
TABLET ORAL
Qty: 90 TAB | Refills: 1 | Status: SHIPPED | OUTPATIENT
Start: 2020-06-26 | End: 2020-08-11

## 2020-06-26 RX ORDER — NEBIVOLOL 20 MG/1
TABLET ORAL
Qty: 90 TAB | Refills: 1 | Status: SHIPPED | OUTPATIENT
Start: 2020-06-26 | End: 2020-11-06 | Stop reason: SDUPTHER

## 2020-06-26 RX ORDER — FLUCONAZOLE 150 MG/1
150 TABLET ORAL DAILY
Qty: 1 TAB | Refills: 0 | Status: SHIPPED | OUTPATIENT
Start: 2020-06-26 | End: 2020-06-27

## 2020-06-26 RX ORDER — AMLODIPINE BESYLATE 10 MG/1
TABLET ORAL
Qty: 90 TAB | Refills: 1 | Status: SHIPPED | OUTPATIENT
Start: 2020-06-26 | End: 2020-11-06 | Stop reason: SDUPTHER

## 2020-06-26 RX ORDER — LISINOPRIL 40 MG/1
TABLET ORAL
Qty: 90 TAB | Refills: 1 | Status: SHIPPED | OUTPATIENT
Start: 2020-06-26 | End: 2020-11-06 | Stop reason: SDUPTHER

## 2020-06-26 RX ORDER — CLOTRIMAZOLE AND BETAMETHASONE DIPROPIONATE 10; .5 MG/ML; MG/ML
LOTION TOPICAL
Qty: 30 ML | Refills: 1 | Status: SHIPPED | OUTPATIENT
Start: 2020-06-26 | End: 2020-11-06 | Stop reason: SDUPTHER

## 2020-06-26 NOTE — PROGRESS NOTES
Isma Toure is a 71 y.o. male evaluated via telephone on 6/26/2020. Consent:  He and/or health care decision maker is aware that he may receive a bill for this telephone service, depending on his insurance coverage, and has provided verbal consent to proceed: Yes      Documentation:  I communicated with the patient and/or health care decision maker about;   Details of this discussion including any medical advice provided:     4th visit with this physician, I last saw him 6 months ago. has a past medical history of Blind right eye (6/5/2019), CAD (coronary artery disease), COPD, Coronary artery disease with history of myocardial infarction without history of CABG (6/5/2019), CVA (cerebral infarction) (3/24/2012), Diabetes ([facility] Essential hypertension (6/5/2019), Gastroesophageal reflux disease without esophagitis (6/5/2019), MI (myocardial infarction) ([facility] [address] [facility] Mixed hyperlipidemia (6/5/2019), Stroke ([facility] [address] [facility] Thromboembolism ([facility] [address] [facility] (6/5/2019), and Thromboembolus ([facility] [address] Tinea cruris: It helped previously but recur. We'll do 1 diflucan again  Order clotrimazole    HTN: BP at goal              Continue amlodipine, bystolic, hygroten, Lisinopril, Potassium     HLD: Lipitor     COPD: breathing have been fine. He haven't need an inhaler for many months. Smoker 55 years, most of his life 2ppd. asked again if he wants to save money and quit. \"i'm gonna keep on smoking\".      He sees cardiologist Jose J.   CAD, MI, s/p CABG     Dr. Phillip Howard endocrinologist.  DM2: A1C 11.4% 06/2020,  8.4% 12/2019, 11.1%              Lantus, Janumet, Glipizide SR     CVA: Effected left side.  Right eye blindness.          He doesn't want Colonoscopy currently. Previous Dr. Cohen Basket      Diagnoses and all orders for this visit:    1. Essential hypertension  -     amLODIPine (NORVASC) 10 mg tablet; Take 1 tablet by mouth once daily  -     nebivoloL (BYSTOLIC) 20 mg tablet;  Take 1 tad daily (please notify pt of this change)  -     lisinopriL (PRINIVIL, ZESTRIL) 40 mg tablet; TAKE 1 TABLET BY MOUTH ONCE DAILY FOR BLOOD PRESSURE AND  KIDNEY  PROTECTION  -     chlorthalidone (HYGROTEN) 25 mg tablet; TAKE 1 TABLET BY MOUTH ONCE DAILY    2. Intertriginous candidiasis  -     clotrimazole-betamethasone (LOTRISONE) 1-0.05 % lotion; Apply to the effected area every morning  -     fluconazole (DIFLUCAN) 150 mg tablet; Take 1 Tab by mouth daily for 1 day. FDA advises cautious prescribing of oral fluconazole in pregnancy. 3. Mixed hyperlipidemia    4. Tobacco abuse disorder    5. Chronic obstructive pulmonary disease, unspecified COPD type ([facility]    6. Encounter for long-term (current) use of medications      Follow-up and Dispositions    · Return in about 3 months (around 9/26/2020) for medicare annual exam.         I affirm this is a Patient Initiated Episode with a Patient who has not had a related appointment within my department in the past 7 days or scheduled within the next 24 hours.     Total Time: minutes: 21-30 minutes    Note: not billable if this call serves to triage the patient into an appointment for the relevant concern      Kaleigh New MD

## 2020-06-26 NOTE — PROGRESS NOTES
Chief Complaint   Patient presents with    Hypertension    Cholesterol Problem    Rash     heat rash that he gets when it gets hot     1. Have you been to the ER, urgent care clinic since your last visit? Hospitalized since your last visit? No    2. Have you seen or consulted any other health care providers outside of the [address] [place] [address] since your last visit? Include any pap smears or colon screening.   no

## 2020-07-22 NOTE — PROGRESS NOTES
1. Have you been to the ER, urgent care clinic since your last visit? Hospitalized since your last visit? No    2. Have you seen or consulted any other health care providers outside of the [address] [place] [address] since your last visit? Include any pap smears or colon screening.  No

## 2020-07-23 ENCOUNTER — OFFICE VISIT (OUTPATIENT)
Dept: CARDIOLOGY CLINIC | Age: 65
End: 2020-07-23

## 2020-07-23 VITALS
HEIGHT: 69 IN | BODY MASS INDEX: 29.27 KG/M2 | HEART RATE: 70 BPM | OXYGEN SATURATION: 97 % | RESPIRATION RATE: 16 BRPM | SYSTOLIC BLOOD PRESSURE: 130 MMHG | WEIGHT: 197.6 LBS | DIASTOLIC BLOOD PRESSURE: 80 MMHG

## 2020-07-23 DIAGNOSIS — E78.2 MIXED HYPERLIPIDEMIA: ICD-10-CM

## 2020-07-23 DIAGNOSIS — Z95.1 S/P CABG X 4: ICD-10-CM

## 2020-07-23 DIAGNOSIS — I50.22 CHRONIC SYSTOLIC HEART FAILURE (HCC): ICD-10-CM

## 2020-07-23 DIAGNOSIS — I25.10 CORONARY ARTERY DISEASE INVOLVING NATIVE CORONARY ARTERY OF NATIVE HEART WITHOUT ANGINA PECTORIS: Primary | ICD-10-CM

## 2020-07-23 DIAGNOSIS — I10 BENIGN ESSENTIAL HYPERTENSION: ICD-10-CM

## 2020-07-23 RX ORDER — FLUCONAZOLE 150 MG/1
150 TABLET ORAL DAILY
COMMUNITY
End: 2020-11-06 | Stop reason: SDUPTHER

## 2020-07-23 NOTE — LETTER
7/23/20 Patient: Karlene Frankel YOB: 1955 Date of Visit: 7/23/2020 Orville Solorio MD 
[address] [address] [address] [address] VIA In Basket Dear Orville Solorio MD, Thank you for referring Mr. Grecia Lopez to [address] [place] [address] [facility] for evaluation. My notes for this consultation are attached. If you have questions, please do not hesitate to call me. I look forward to following your patient along with you. Sincerely, Janiya Van MD

## 2020-07-23 NOTE — PROGRESS NOTES
Chief Complaint   Patient presents with    Follow-up    Results    CHF    Coronary Artery Disease    Cholesterol Problem    Hypertension   1. Have you been to the ER, urgent care clinic since your last visit? Hospitalized since your last visit? No    2. Have you seen or consulted any other health care providers outside of the [address] [place] [address] since your last visit? Include any pap smears or colon screening. No    Patient C/O leg cramps.

## 2020-07-23 NOTE — PROGRESS NOTES
Judi Polk, [facility]    Subjective/HPI:     Brandi López is a 76 y.o. male is here for routine f/u. He has a PMHx of CAD s/p CABGx4, DM2, CVA, HTN and HLD. He is doing well today. Today is his birthday, so he will be spending the rest of the day with his daughter in [place]. He denies complaints of chest pains, dizziness, orthopnea, PND or edema. He denies palpitation symptoms. He denies shortness of breath symptoms. PCP Provider  Sherlene Lennox, MD    Past Medical History:   Diagnosis Date    Blind right eye 6/5/2019    CAD (coronary artery disease)     COPD     Coronary artery disease with history of myocardial infarction without history of CABG 6/5/2019    CVA (cerebral infarction) 3/24/2012    Diabetes ([address]     Essential hypertension 6/5/2019    Gastroesophageal reflux disease without esophagitis 6/5/2019    MI (myocardial infarction) ([address]     Mixed hyperlipidemia 6/5/2019    Stroke ([address]     Thromboembolism ([address] 6/5/2019    Thromboembolus ([address]         No Known Allergies     Outpatient Encounter Medications as of 7/23/2020   Medication Sig Dispense Refill    OTHER hemeopathic leg cramps      POTASSIUM-99 PO Take  by mouth daily.  PSYLLIUM HUSK PO Take  by mouth.  fluconazole (DIFLUCAN) 150 mg tablet Take 150 mg by mouth daily. FDA advises cautious prescribing of oral fluconazole in pregnancy.       amLODIPine (NORVASC) 10 mg tablet Take 1 tablet by mouth once daily 90 Tab 1    nebivoloL (BYSTOLIC) 20 mg tablet Take 1 tad daily (please notify pt of this change) 90 Tab 1    lisinopriL (PRINIVIL, ZESTRIL) 40 mg tablet TAKE 1 TABLET BY MOUTH ONCE DAILY FOR BLOOD PRESSURE AND  KIDNEY  PROTECTION 90 Tab 1    chlorthalidone (HYGROTEN) 25 mg tablet TAKE 1 TABLET BY MOUTH ONCE DAILY 90 Tab 1    clotrimazole-betamethasone (LOTRISONE) 1-0.05 % lotion Apply to the effected area every morning 30 mL 1    potassium chloride SR (KLOR-CON 10) 10 mEq tablet Take 1 tablet by mouth once daily 90 Tab 3    atorvastatin (LIPITOR) 40 mg tablet Take 1 tablet by mouth once daily 90 Tab 3    insulin glargine (LANTUS SOLOSTAR U-100 INSULIN) 100 unit/mL (3 mL) inpn INJECT 9 UNITS SUBCUTANEOUSLY ONCE DAILY (Patient taking differently: 10 Units. INJECT 9 UNITS SUBCUTANEOUSLY ONCE DAILY) 15 mL 3    nitroglycerin (NITROSTAT) 0.4 mg SL tablet 1 Tab by SubLINGual route every five (5) minutes as needed for Chest Pain. 1 Bottle 1    SITagliptin-metFORMIN (JANUMET XR) 100-1,000 mg TM24 Take 1 Tab by mouth daily. 90 Tab 3    sildenafil citrate (VIAGRA) 25 mg tablet Take 1 Tab by mouth as needed (ED). 30 Tab 1    glipiZIDE SR (GLUCOTROL XL) 10 mg CR tablet Take 1 Tab by mouth daily. 90 Tab 3    famotidine (PEPCID) 20 mg tablet Take 20 mg by mouth daily.  white petrolatum (WHITE PETROLEUM JELLY) topical onitment Apply to the affected area every morning. To uses once the rash is gone. 2270 g 3    cholecalciferol, vitamin D3, (VITAMIN D3) 2,000 unit tab Take  by mouth daily.  acetaminophen (TYLENOL) 325 mg tablet Take 1,000 mg by mouth every four (4) hours as needed for Pain.  aspirin 81 mg chewable tablet Take 81 mg by mouth daily.  [DISCONTINUED] clotrimazole (LOTRIMIN) 1 % topical cream Apply  to affected area two (2) times a day. (Patient not taking: Reported on 7/23/2020) 28 g 1    [DISCONTINUED] glycerin-mineral oil-lanolin (EUCERIN PLUS) topical cream Apply twice daily as needed (Patient not taking: Reported on 7/23/2020) 1 Tube 3     No facility-administered encounter medications on file as of 7/23/2020. Review of Symptoms:    Review of Systems   Constitutional: Negative for chills, fever and weight loss. HENT: Negative for nosebleeds. Eyes: Negative for blurred vision and double vision. Respiratory: Negative for cough, shortness of breath and wheezing.     Cardiovascular: Negative for chest pain, palpitations, orthopnea, leg swelling and PND. Gastrointestinal: Negative for abdominal pain, blood in stool, diarrhea, nausea and vomiting. Musculoskeletal: Negative for joint pain. Skin: Negative for rash. Neurological: Negative for dizziness, tingling and loss of consciousness. Endo/Heme/Allergies: Does not bruise/bleed easily. Physical Exam:      General: Well developed, in no acute distress, cooperative and alert  HEENT: No carotid bruits, no JVD, trach is midline. Neck Supple, PEERL, EOM intact. Heart:  reg rate and rhythm; normal S1/S2; no murmurs, no gallops or rubs. Respiratory: Clear bilaterally x 4, no wheezing or rales  Abdomen:   Soft, non-tender, no distention, no masses. + BS. Extremities:  Normal cap refill, no cyanosis, atraumatic. No edema. Neuro: A&Ox3, speech clear, gait stable. Skin: Skin color is normal. No rashes or lesions.  Non diaphoretic  Vascular: 2+ pulses symmetric in all extremities    Vitals:    07/23/20 1415   BP: 130/80   Pulse: 70   Resp: 16   SpO2: 97%   Weight: 197 lb 9.6 oz (89.6 kg)   Height: 5' 9\" (1.753 m)       ECG: sinus rhythm    Cardiology Labs:    Lab Results   Component Value Date/Time    Cholesterol, total 129 06/10/2020 02:32 PM    HDL Cholesterol 26 (L) 06/10/2020 02:32 PM    LDL, calculated 44 06/10/2020 02:32 PM    LDL, calculated 49 06/05/2019 02:51 PM    LDL, calculated 66 07/14/2017 09:38 AM    VLDL, calculated 59 (H) 06/10/2020 02:32 PM    CHOL/HDL Ratio 5.2 (H) 09/21/2012 04:35 AM       Lab Results   Component Value Date/Time    Hemoglobin A1c 11.4 (H) 06/10/2020 02:32 PM    Hemoglobin A1c (POC) 8.4 12/04/2019 11:10 AM       Lab Results   Component Value Date/Time    Sodium 133 (L) 06/10/2020 02:32 PM    Potassium 3.8 06/10/2020 02:32 PM    Chloride 94 (L) 06/10/2020 02:32 PM    CO2 23 06/10/2020 02:32 PM    Glucose 369 (H) 06/10/2020 02:32 PM    BUN 19 06/10/2020 02:32 PM    Creatinine 1.14 06/10/2020 02:32 PM    BUN/Creatinine ratio 17 06/10/2020 02:32 PM    GFR est AA 78 06/10/2020 02:32 PM    GFR est non-AA 67 06/10/2020 02:32 PM    Calcium 9.7 06/10/2020 02:32 PM    Anion gap 7 09/21/2012 04:35 AM    Bilirubin, total 0.6 06/10/2020 02:32 PM    ALT (SGPT) 21 06/10/2020 02:32 PM    Alk. phosphatase 85 06/10/2020 02:32 PM    Protein, total 6.4 06/10/2020 02:32 PM    Albumin 4.1 06/10/2020 02:32 PM    Globulin 2.4 05/23/2012 04:05 AM    A-G Ratio 1.8 06/10/2020 02:32 PM          Assessment:       ICD-10-CM ICD-9-CM    1. Coronary artery disease involving native coronary artery of native heart without angina pectoris  I25.10 414.01 AMB POC EKG ROUTINE W/ 12 LEADS, INTER & REP   2. Chronic systolic heart failure (HCC)  I50.22 428.22 AMB POC EKG ROUTINE W/ 12 LEADS, INTER & REP   3. Benign essential hypertension  I10 401.1 AMB POC EKG ROUTINE W/ 12 LEADS, INTER & REP   4. Mixed hyperlipidemia  E78.2 272.2 AMB POC EKG ROUTINE W/ 12 LEADS, INTER & REP   5. S/P CABG x 4  Z95.1 V45.81 AMB POC EKG ROUTINE W/ 12 LEADS, INTER & REP        Plan:     1. Coronary artery disease involving native coronary artery of native heart without angina pectoris  S/p CABGx4 with subsequent ARETHA to LCx in 9/2012  Without anginal or anginal equivalent symptoms  Continue BB, ASA and statin therapy     2. Chronic systolic heart failure (HCC)  Hx of ischemic cardiomyopathy, now resolved  Echo in done 6/2020 with preserved ejection fraction 55-60%  Euvolemic on exam today  Continue with BB, lisinopril     3. Benign essential hypertension  BP controlled. Continue anti-hypertensive therapy and low sodium diet     4. Mixed hyperlipidemia  LDL 44 in 6/2020  Continue statin therapy and low fat, low cholesterol diet  Lipids managed by PCP     5. S/P CABG x 4  S/p off pump CABG with VG to OM1, seq VG to PDA and RAY and LIMA to LAD in 5/2012     F/u with Dr. Maldonado Officer in 1 year    Nabor Hyatt NP       [facility]    7/23/2020         Patient seen, examined by me personally.  Plan discussed as detailed. Agree with note as outlined by  NP. I confirm findings in history and physical exam. No additional findings noted. Agree with plan as outlined above.      Julita Davalos MD

## 2020-08-04 RX ORDER — GLIPIZIDE 10 MG/1
TABLET, FILM COATED, EXTENDED RELEASE ORAL
Qty: 90 TAB | Refills: 0 | Status: SHIPPED | OUTPATIENT
Start: 2020-08-04 | End: 2020-11-09

## 2020-11-06 ENCOUNTER — VIRTUAL VISIT (OUTPATIENT)
Dept: FAMILY MEDICINE CLINIC | Age: 65
End: 2020-11-06
Payer: MEDICARE

## 2020-11-06 DIAGNOSIS — Z00.00 MEDICARE ANNUAL WELLNESS VISIT, SUBSEQUENT: Primary | ICD-10-CM

## 2020-11-06 DIAGNOSIS — B37.2 INTERTRIGINOUS CANDIDIASIS: ICD-10-CM

## 2020-11-06 DIAGNOSIS — Z72.0 TOBACCO ABUSE DISORDER: ICD-10-CM

## 2020-11-06 DIAGNOSIS — Z79.899 ENCOUNTER FOR LONG-TERM (CURRENT) USE OF MEDICATIONS: ICD-10-CM

## 2020-11-06 DIAGNOSIS — E78.2 MIXED HYPERLIPIDEMIA: ICD-10-CM

## 2020-11-06 DIAGNOSIS — J44.9 CHRONIC OBSTRUCTIVE PULMONARY DISEASE, UNSPECIFIED COPD TYPE (HCC): ICD-10-CM

## 2020-11-06 DIAGNOSIS — I10 ESSENTIAL HYPERTENSION: ICD-10-CM

## 2020-11-06 PROCEDURE — G8756 NO BP MEASURE DOC: HCPCS | Performed by: FAMILY MEDICINE

## 2020-11-06 PROCEDURE — G8536 NO DOC ELDER MAL SCRN: HCPCS | Performed by: FAMILY MEDICINE

## 2020-11-06 PROCEDURE — G8427 DOCREV CUR MEDS BY ELIG CLIN: HCPCS | Performed by: FAMILY MEDICINE

## 2020-11-06 PROCEDURE — G0463 HOSPITAL OUTPT CLINIC VISIT: HCPCS | Performed by: FAMILY MEDICINE

## 2020-11-06 PROCEDURE — 1101F PT FALLS ASSESS-DOCD LE1/YR: CPT | Performed by: FAMILY MEDICINE

## 2020-11-06 PROCEDURE — 3017F COLORECTAL CA SCREEN DOC REV: CPT | Performed by: FAMILY MEDICINE

## 2020-11-06 PROCEDURE — G0439 PPPS, SUBSEQ VISIT: HCPCS | Performed by: FAMILY MEDICINE

## 2020-11-06 PROCEDURE — G8510 SCR DEP NEG, NO PLAN REQD: HCPCS | Performed by: FAMILY MEDICINE

## 2020-11-06 PROCEDURE — 99212 OFFICE O/P EST SF 10 MIN: CPT | Performed by: FAMILY MEDICINE

## 2020-11-06 PROCEDURE — G8419 CALC BMI OUT NRM PARAM NOF/U: HCPCS | Performed by: FAMILY MEDICINE

## 2020-11-06 RX ORDER — CLOTRIMAZOLE AND BETAMETHASONE DIPROPIONATE 10; .5 MG/ML; MG/ML
LOTION TOPICAL
Qty: 30 ML | Refills: 1 | Status: SHIPPED | OUTPATIENT
Start: 2020-11-06

## 2020-11-06 RX ORDER — NEBIVOLOL 20 MG/1
TABLET ORAL
Qty: 90 TAB | Refills: 1 | Status: SHIPPED | OUTPATIENT
Start: 2020-11-06 | End: 2021-05-20 | Stop reason: SDUPTHER

## 2020-11-06 RX ORDER — ATORVASTATIN CALCIUM 40 MG/1
TABLET, FILM COATED ORAL
Qty: 90 TAB | Refills: 1 | Status: SHIPPED | OUTPATIENT
Start: 2020-11-06 | End: 2021-05-20 | Stop reason: SDUPTHER

## 2020-11-06 RX ORDER — LISINOPRIL 40 MG/1
TABLET ORAL
Qty: 90 TAB | Refills: 1 | Status: SHIPPED | OUTPATIENT
Start: 2020-11-06 | End: 2021-05-20 | Stop reason: SDUPTHER

## 2020-11-06 RX ORDER — AMLODIPINE BESYLATE 10 MG/1
TABLET ORAL
Qty: 90 TAB | Refills: 1 | Status: SHIPPED | OUTPATIENT
Start: 2020-11-06 | End: 2021-05-20 | Stop reason: SDUPTHER

## 2020-11-06 RX ORDER — CHLORTHALIDONE 25 MG/1
25 TABLET ORAL DAILY
Qty: 90 TAB | Refills: 1 | Status: SHIPPED | OUTPATIENT
Start: 2020-11-06 | End: 2021-05-20 | Stop reason: SDUPTHER

## 2020-11-06 RX ORDER — FLUCONAZOLE 150 MG/1
150 TABLET ORAL DAILY
Qty: 1 TAB | Refills: 2 | Status: SHIPPED | OUTPATIENT
Start: 2020-11-06 | End: 2021-05-20 | Stop reason: ALTCHOICE

## 2020-11-06 NOTE — PROGRESS NOTES
Ian Love is a 76 y.o. male evaluated via telephone on 11/6/2020. Consent:  He and/or health care decision maker is aware that he may receive a bill for this telephone service, depending on his insurance coverage, and has provided verbal consent to proceed: Yes      Documentation:  I communicated with the patient and/or health care decision maker about;   Details of this discussion including any medical advice provided:     [place] has a past medical history of Blind right eye (6/5/2019), CAD (coronary artery disease), COPD, Coronary artery disease with history of myocardial infarction without history of CABG (6/5/2019), CVA (cerebral infarction) (3/24/2012), Diabetes ([facility] Essential hypertension (6/5/2019), Gastroesophageal reflux disease without esophagitis (6/5/2019), MI (myocardial infarction) ([facility] Mixed hyperlipidemia (6/5/2019), Stroke ([facility] Thromboembolism ([facility] (6/5/2019), and Thromboembolus ([facility] Tinea cruris: It helped previously but recur. We'll 1 diflucan with 2 refills again  Ordered clotrimazole    HTN: BP at goal              Continue amlodipine, bystolic, hygroten, Lisinopril, Potassium     HLD: Lipitor     COPD: breathing have been fine. He haven't need an inhaler for many months. Smoker 55 years, most of his life 2ppd. asked again if he wants to save money and quit. \"i'm gonna keep on smoking\".      He sees cardiologist Jose J.   CAD, MI, s/p CABG     Dr. Phil Amador endocrinologist.  DM2: A1C 11.4% 06/2020,  8.4% 12/2019, 11.1%              Lantus, Janumet, Glipizide SR     CVA: Effected left side.  Right eye blindness.       He doesn't want to do any labs at the present. We'll leave this to Dr. Phil Amador,      He still doesn't want Colonoscopy. Previous Dr. Stoney Baeza    Alcohol Risk Factor Screening: You do not drink alcohol or very rarely.     Functional Ability and Level of Safety:     Hearing Loss   Hearing is good. Activities of Daily Living   Self-care. Requires assistance with: no ADLs    Fall Risk   Low fall risk    Abuse Screen   Patient is not abused    Review of Systems   A comprehensive review of systems was negative except for that written in the HPI. Physical Examination     Evaluation of Cognitive Function:  Mood/affect:  neutral, happy      Diagnoses and all orders for this visit:    1. Medicare annual wellness visit, subsequent    2. Essential hypertension  -     chlorthalidone (HYGROTON) 25 mg tablet; Take 1 Tab by mouth daily. -     amLODIPine (NORVASC) 10 mg tablet; Take 1 tablet by mouth once daily  -     nebivoloL (BYSTOLIC) 20 mg tablet; Take 1 tad daily (please notify pt of this change)  -     lisinopriL (PRINIVIL, ZESTRIL) 40 mg tablet; TAKE 1 TABLET BY MOUTH ONCE DAILY FOR BLOOD PRESSURE AND  KIDNEY  PROTECTION    3. Intertriginous candidiasis  -     fluconazole (DIFLUCAN) 150 mg tablet; Take 1 Tab by mouth daily. FDA advises cautious prescribing of oral fluconazole in pregnancy. -     clotrimazole-betamethasone (LOTRISONE) 1-0.05 % lotion; Apply to the effected area every morning    4. Mixed hyperlipidemia  -     atorvastatin (LIPITOR) 40 mg tablet; Take 1 tablet by mouth once daily    5. Tobacco abuse disorder    6. Chronic obstructive pulmonary disease, unspecified COPD type ([address]    7. Encounter for long-term (current) use of medications      Follow-up and Dispositions    · Return for He doesn't want apt any sooner than 5 months. f/u for HTN, HLD, COPD, meds, labs. I affirm this is a Patient Initiated Episode with a Patient who has not had a related appointment within my department in the past 7 days or scheduled within the next 24 hours.     Total Time: minutes: 21-30 minutes    Note: not billable if this call serves to triage the patient into an appointment for the relevant concern      Cem Cerrato MD

## 2020-11-06 NOTE — PROGRESS NOTES
Chief Complaint   Patient presents with    Hypertension     Check meds & labs    1. Have you been to the ER, urgent care clinic since your last visit? Hospitalized since your last visit? no    2. Have you seen or consulted any other health care providers outside of the [address] [place] [address] since your last visit? Include any pap smears or colon screening.  no

## 2020-12-18 ENCOUNTER — VIRTUAL VISIT (OUTPATIENT)
Dept: ENDOCRINOLOGY | Age: 65
End: 2020-12-18
Payer: MEDICARE

## 2020-12-18 DIAGNOSIS — E11.21 TYPE 2 DIABETES MELLITUS WITH DIABETIC NEPHROPATHY, WITH LONG-TERM CURRENT USE OF INSULIN (HCC): ICD-10-CM

## 2020-12-18 DIAGNOSIS — Z79.4 TYPE 2 DIABETES MELLITUS WITH DIABETIC NEPHROPATHY, WITH LONG-TERM CURRENT USE OF INSULIN (HCC): ICD-10-CM

## 2020-12-18 DIAGNOSIS — E78.2 MIXED HYPERLIPIDEMIA: ICD-10-CM

## 2020-12-18 DIAGNOSIS — I10 ESSENTIAL HYPERTENSION: Primary | ICD-10-CM

## 2020-12-18 PROCEDURE — 99442 PR PHYS/QHP TELEPHONE EVALUATION 11-20 MIN: CPT | Performed by: INTERNAL MEDICINE

## 2020-12-18 RX ORDER — INSULIN GLARGINE 100 [IU]/ML
INJECTION, SOLUTION SUBCUTANEOUS
Qty: 15 ML | Refills: 3 | Status: SHIPPED | OUTPATIENT
Start: 2020-12-18 | End: 2021-11-30 | Stop reason: SDUPTHER

## 2020-12-18 RX ORDER — INSULIN GLARGINE 100 [IU]/ML
INJECTION, SOLUTION SUBCUTANEOUS
Qty: 15 ML | Refills: 3 | Status: SHIPPED | OUTPATIENT
Start: 2020-12-18 | End: 2020-12-18

## 2020-12-18 NOTE — PROGRESS NOTES
Chief Complaint   Patient presents with    Diabetes     pcp and pharmacy verfied. Eye exam: last year. DOXY. ME   Records since his last visit reviewed. **THIS IS A VIRTUAL VISIT VIA A TELEPHONE ENCOUNTER. PATIENT AGREED TO HAVE THEIR CARE DELIVERED OVER THE PHONE IN PLACE OF THEIR REGULARLY SCHEDULED OFFICE VISIT**      History of Present Illness: Daniel Dickey is a 76 y.o. male here for follow up of diabetes. Pt is currently taking Glipizde ER 10mg in the AM only and Janumet /1000mg in the AM only and Lantus 10 units in the morning. Pt is not checking his BGs, but he has not been having issues of hypoglycemia. He denies issues of CP, SOB, palpitations, polyuria and polydipsia. No recent illnesses, injuries or hospitalizations. Pt is eating 3 meals daily. His largest meal is Dinner. He has breakfast between 8-9AM, today he had eggs, suazo, toast, milk and OJ. He will have lunch around 1PM, yesterday he had a turkey sandwich and regular soda. Last night for dinner he had roasted chicken, potatoes, peas and iced tea (sweet). He is followed by Dr. Ramesh Rucker of cardiology. He stays busy chopping wood and yard work and work around [employer]. He notes he has been walking around in his yard as well. \"I think I have been working harder now that I am retired\". Pt notes he has some weakness in his left leg secondary to his prior CVA. 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 No history of retinopathy, neuropathy, but has some nephropathy. Last eye exam was April 2018, no retinopathy. Current Outpatient Medications   Medication Sig    glipiZIDE SR (GLUCOTROL XL) 10 mg CR tablet Take 1 tablet by mouth once daily    fluconazole (DIFLUCAN) 150 mg tablet Take 1 Tab by mouth daily. FDA advises cautious prescribing of oral fluconazole in pregnancy.     chlorthalidone (HYGROTON) 25 mg tablet Take 1 Tab by mouth daily.  amLODIPine (NORVASC) 10 mg tablet Take 1 tablet by mouth once daily    nebivoloL (BYSTOLIC) 20 mg tablet Take 1 tad daily (please notify pt of this change)    lisinopriL (PRINIVIL, ZESTRIL) 40 mg tablet TAKE 1 TABLET BY MOUTH ONCE DAILY FOR BLOOD PRESSURE AND  KIDNEY  PROTECTION    clotrimazole-betamethasone (LOTRISONE) 1-0.05 % lotion Apply to the effected area every morning    atorvastatin (LIPITOR) 40 mg tablet Take 1 tablet by mouth once daily    Janumet -1,000 mg TM24 Take 1 tablet by mouth once daily    OTHER hemeopathic leg cramps    POTASSIUM-99 PO Take  by mouth daily. Takes PRN if needs it.  potassium chloride SR (KLOR-CON 10) 10 mEq tablet Take 1 tablet by mouth once daily    insulin glargine (LANTUS SOLOSTAR U-100 INSULIN) 100 unit/mL (3 mL) inpn INJECT 9 UNITS SUBCUTANEOUSLY ONCE DAILY (Patient taking differently: 10 Units. INJECT 10 UNITS SUBCUTANEOUSLY ONCE DAILY)    nitroglycerin (NITROSTAT) 0.4 mg SL tablet 1 Tab by SubLINGual route every five (5) minutes as needed for Chest Pain.  sildenafil citrate (VIAGRA) 25 mg tablet Take 1 Tab by mouth as needed (ED).  famotidine (PEPCID) 20 mg tablet Take 20 mg by mouth daily.  cholecalciferol, vitamin D3, (VITAMIN D3) 2,000 unit tab Take  by mouth daily.  acetaminophen (TYLENOL) 325 mg tablet Take 1,000 mg by mouth every four (4) hours as needed for Pain.  aspirin 81 mg chewable tablet Take 81 mg by mouth daily. No current facility-administered medications for this visit. No Known Allergies  Review of Systems:  - Eyes: no blurry vision or double vision  - Cardiovascular: no chest pain  - Respiratory: no shortness of breath  - Musculoskeletal: no myalgias  - Neurological: no numbness/tingling in extremities    Physical Examination:  There were no vitals taken for this visit.   None    Data Reviewed:   None    Assessment/Plan:   1) DM > Pt is not checking his BGs, he is also not having any issues of low BGs. His goal A1C is under 8.0%. For now pt to continue the Lantus 10 units daily, Glipizide ER 10mg daily and Janumet XR to 100/1000mg daliy. Will order an A1C, lipid panel, CMP and urine MA. Pt to check his blood sugars twice per day. Pt to cut out the sodas and sweet tea. 2) HTN > He is on an ACEI for renal protection. Will not make any changes at this time. Pt voices understanding and agreement with the plan. We spent 12 minutes of total time together during this virtual visit with a telephone encounter. All questions were answered and a copy of the instructions were sent to her via [employer]/a letter.          Copy sent to:  Dr. Misha Bryant

## 2021-01-04 DIAGNOSIS — Z79.4 TYPE 2 DIABETES MELLITUS WITH DIABETIC NEPHROPATHY, WITH LONG-TERM CURRENT USE OF INSULIN (HCC): ICD-10-CM

## 2021-01-04 DIAGNOSIS — E11.21 TYPE 2 DIABETES MELLITUS WITH DIABETIC NEPHROPATHY, WITH LONG-TERM CURRENT USE OF INSULIN (HCC): ICD-10-CM

## 2021-02-18 ENCOUNTER — TELEPHONE (OUTPATIENT)
Dept: CARDIOLOGY CLINIC | Age: 66
End: 2021-02-18

## 2021-02-18 NOTE — TELEPHONE ENCOUNTER
Faxed form back to Health system,  # [phone], fax # [phone]. Verified patient with two patient identifiers. Cleared for extractions, root canal, etc, local anesthesia with Epinephine. SBE prophylaxis NOT needed. Okay for 4% septocaine with epinephrine, okay for 3% Mepivacaine without epinephrine. Form faxed to them.

## 2021-05-20 ENCOUNTER — TELEPHONE (OUTPATIENT)
Dept: FAMILY MEDICINE CLINIC | Age: 66
End: 2021-05-20

## 2021-05-20 ENCOUNTER — VIRTUAL VISIT (OUTPATIENT)
Dept: FAMILY MEDICINE CLINIC | Age: 66
End: 2021-05-20

## 2021-05-20 ENCOUNTER — VIRTUAL VISIT (OUTPATIENT)
Dept: ENDOCRINOLOGY | Age: 66
End: 2021-05-20
Payer: MEDICARE

## 2021-05-20 DIAGNOSIS — I10 ESSENTIAL HYPERTENSION: ICD-10-CM

## 2021-05-20 DIAGNOSIS — I10 ESSENTIAL HYPERTENSION: Primary | ICD-10-CM

## 2021-05-20 DIAGNOSIS — J44.9 CHRONIC OBSTRUCTIVE PULMONARY DISEASE, UNSPECIFIED COPD TYPE (HCC): ICD-10-CM

## 2021-05-20 DIAGNOSIS — E78.2 MIXED HYPERLIPIDEMIA: ICD-10-CM

## 2021-05-20 DIAGNOSIS — Z79.4 TYPE 2 DIABETES MELLITUS WITH DIABETIC NEPHROPATHY, WITH LONG-TERM CURRENT USE OF INSULIN (HCC): Primary | ICD-10-CM

## 2021-05-20 DIAGNOSIS — N52.9 ERECTILE DYSFUNCTION, UNSPECIFIED ERECTILE DYSFUNCTION TYPE: ICD-10-CM

## 2021-05-20 DIAGNOSIS — E11.9 TYPE 2 DIABETES MELLITUS WITHOUT COMPLICATION, WITHOUT LONG-TERM CURRENT USE OF INSULIN (HCC): ICD-10-CM

## 2021-05-20 DIAGNOSIS — E11.21 TYPE 2 DIABETES MELLITUS WITH DIABETIC NEPHROPATHY, WITH LONG-TERM CURRENT USE OF INSULIN (HCC): Primary | ICD-10-CM

## 2021-05-20 DIAGNOSIS — Z79.899 ENCOUNTER FOR LONG-TERM (CURRENT) USE OF MEDICATIONS: ICD-10-CM

## 2021-05-20 PROCEDURE — 99442 PR PHYS/QHP TELEPHONE EVALUATION 11-20 MIN: CPT | Performed by: FAMILY MEDICINE

## 2021-05-20 PROCEDURE — 99442 PR PHYS/QHP TELEPHONE EVALUATION 11-20 MIN: CPT | Performed by: INTERNAL MEDICINE

## 2021-05-20 RX ORDER — CHLORTHALIDONE 25 MG/1
25 TABLET ORAL DAILY
Qty: 90 TABLET | Refills: 1 | Status: SHIPPED | OUTPATIENT
Start: 2021-05-20 | End: 2021-11-22

## 2021-05-20 RX ORDER — NEBIVOLOL 20 MG/1
TABLET ORAL
Qty: 90 TABLET | Refills: 1 | Status: SHIPPED | OUTPATIENT
Start: 2021-05-20 | End: 2021-11-22

## 2021-05-20 RX ORDER — AMLODIPINE BESYLATE 10 MG/1
TABLET ORAL
Qty: 90 TABLET | Refills: 1 | Status: SHIPPED | OUTPATIENT
Start: 2021-05-20 | End: 2021-12-03 | Stop reason: SDUPTHER

## 2021-05-20 RX ORDER — ATORVASTATIN CALCIUM 40 MG/1
TABLET, FILM COATED ORAL
Qty: 90 TABLET | Refills: 1 | Status: SHIPPED | OUTPATIENT
Start: 2021-05-20 | End: 2021-12-03 | Stop reason: SDUPTHER

## 2021-05-20 RX ORDER — LISINOPRIL 40 MG/1
TABLET ORAL
Qty: 90 TABLET | Refills: 1 | Status: SHIPPED | OUTPATIENT
Start: 2021-05-20 | End: 2021-11-22

## 2021-05-20 RX ORDER — SILDENAFIL 25 MG/1
25 TABLET, FILM COATED ORAL AS NEEDED
Qty: 30 TABLET | Refills: 1 | Status: SHIPPED | OUTPATIENT
Start: 2021-05-20 | End: 2022-06-03

## 2021-05-20 NOTE — PROGRESS NOTES
Chief Complaint   Patient presents with    Hypertension    Cholesterol Problem    COPD     6 mo check    1. Have you been to the ER, urgent care clinic since your last visit? Hospitalized since your last visit? No     2. Have you seen or consulted any other health care providers outside of the [address] [place] [address] since your last visit? Include any pap smears or colon screening.  No

## 2021-05-20 NOTE — PROGRESS NOTES
Ilana Anders is a 76 y.o. male evaluated via telephone on 5/20/2021. Consent:  He and/or health care decision maker is aware that he may receive a bill for this telephone service, depending on his insurance coverage, and has provided verbal consent to proceed: Yes      Documentation:  I communicated with the patient and/or health care decision maker about;   Details of this discussion including any medical advice provided:     [place] It's been almost 12 months without any labs. Advised I needed it and can't wait another 6 months. He's on a lot of meds that needs labs monitoring. has a past medical history of Blind right eye (6/5/2019), CAD (coronary artery disease), COPD, Coronary artery disease with history of myocardial infarction without history of CABG (6/5/2019), CVA (cerebral infarction) (3/24/2012), Diabetes ([address] Essential hypertension (6/5/2019), Gastroesophageal reflux disease without esophagitis (6/5/2019), MI (myocardial infarction) ([address] Mixed hyperlipidemia (6/5/2019), Stroke ([address] Thromboembolism ([address] (6/5/2019), and Thromboembolus ([address] He haven't got his covid vaccine, but planning to. HTN: BP at goal              Continue amlodipine, bystolic, hygroten, Lisinopril, Potassium     HLD: Lipitor     COPD: breathing have been fine. He haven't need an inhaler for many months. Smoker 55 years, most of his life 2ppd. asked again if he wants to save money and quit. \"i'm gonna keep on smoking\".      He sees cardiologist Jose J.   CAD, MI, s/p CABG     Dr. Rc Wilson endocrinologist.  DM2: A1C 11.4% 06/2020,  8.4% 12/2019, 11.1%              Lantus, Janumet, Glipizide SR     CVA: Effected left side.  Right eye blindness.        He still doesn't want Colonoscopy. Previous Dr. Lino Mccallum      Diagnoses and all orders for this visit:    1. Essential hypertension  -     chlorthalidone (HYGROTON) 25 mg tablet;  Take 1 Tablet by mouth daily.  -     amLODIPine (NORVASC) 10 mg tablet; Take 1 tablet by mouth once daily  -     nebivoloL (BYSTOLIC) 20 mg tablet; Take 1 tad daily (please notify pt of this change)  -     lisinopriL (PRINIVIL, ZESTRIL) 40 mg tablet; TAKE 1 TABLET BY MOUTH ONCE DAILY FOR BLOOD PRESSURE AND  KIDNEY  PROTECTION  -     CBC W/O DIFF  -     TSH 3RD GENERATION    2. Chronic obstructive pulmonary disease, unspecified COPD type (HCC)  -     CBC W/O DIFF  -     TSH 3RD GENERATION    3. Mixed hyperlipidemia  -     atorvastatin (LIPITOR) 40 mg tablet; Take 1 tablet by mouth once daily  -     CBC W/O DIFF  -     TSH 3RD GENERATION    4. Type 2 diabetes mellitus without complication, without long-term current use of insulin (HCC)  -     CBC W/O DIFF  -     TSH 3RD GENERATION    5. Erectile dysfunction, unspecified erectile dysfunction type  -     sildenafil citrate (VIAGRA) 25 mg tablet; Take 1 Tablet by mouth as needed (ED). 6. Encounter for long-term (current) use of medications  -     CBC W/O DIFF  -     TSH 3RD GENERATION      Follow-up and Dispositions    · Return in about 6 months (around 11/20/2021) for chronic meds issues, due to inconvenient from him, but would like to see him more frequently. .         I affirm this is a Patient Initiated Episode with a Patient who has not had a related appointment within my department in the past 7 days or scheduled within the next 24 hours.     Total Time: minutes: 11-20 minutes    Note: not billable if this call serves to triage the patient into an appointment for the relevant concern      Tess [place] MD

## 2021-05-20 NOTE — PROGRESS NOTES
Chief Complaint   Patient presents with    Diabetes     pcp and pharmacy verified. Telephone call    Records since his last visit reviewed. **THIS IS A VIRTUAL VISIT VIA A TELEPHONE ENCOUNTER. PATIENT AGREED TO HAVE THEIR CARE DELIVERED OVER THE PHONE IN PLACE OF THEIR REGULARLY SCHEDULED OFFICE VISIT**      History of Present Illness: Matthew Wood is a 76 y.o. male here for follow up of diabetes. Pt is currently taking Glipizde ER 10mg in the AM only and Janumet /1000mg in the AM only and Lantus 10 units in the morning. Pt is not checking his BGs, but he has not been having issues of hypoglycemia. He denies issues of CP, SOB, palpitations, polyuria, he notes he has nocturia. No recent illnesses, injuries or hospitalizations. Pt notes he has had some dental issues and had to have some extractions and a partial plate made. Pt is eating 3 meals daily. His largest meal is Dinner. He has breakfast between 8-9AM, today he had eggs, suazo, toast, milk and coffee (splenda). He will have lunch around 1PM, yesterday he had a turkey sandwich and regular soda. Last night for dinner he had broccoli, chicken nuggets, mac and cheese, mashed potatoes and iced tea (sweet). He is followed by Dr. Shahrzad Beauchamp of cardiology. He stays busy chopping wood and yard work and work around [employer]. He notes he has been walking around in his yard as well. Pt notes he has some weakness in his left leg secondary to his prior CVA. 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 Last eye exam was April 2018, no retinopathy. \"I am going to set up an appointment soon. \"    Current Outpatient Medications   Medication Sig    potassium chloride SR (KLOR-CON 10) 10 mEq tablet Take 1 tablet by mouth once daily    insulin glargine (Lantus Solostar U-100 Insulin) 100 unit/mL (3 mL) inpn INJECT 10 UNITS SUBCUTANEOUSLY ONCE DAILY    glipiZIDE SR (GLUCOTROL XL) 10 mg CR tablet Take 1 tablet by mouth once daily    fluconazole (DIFLUCAN) 150 mg tablet Take 1 Tab by mouth daily. FDA advises cautious prescribing of oral fluconazole in pregnancy.  chlorthalidone (HYGROTON) 25 mg tablet Take 1 Tab by mouth daily.  amLODIPine (NORVASC) 10 mg tablet Take 1 tablet by mouth once daily    nebivoloL (BYSTOLIC) 20 mg tablet Take 1 tad daily (please notify pt of this change)    lisinopriL (PRINIVIL, ZESTRIL) 40 mg tablet TAKE 1 TABLET BY MOUTH ONCE DAILY FOR BLOOD PRESSURE AND  KIDNEY  PROTECTION    clotrimazole-betamethasone (LOTRISONE) 1-0.05 % lotion Apply to the effected area every morning    atorvastatin (LIPITOR) 40 mg tablet Take 1 tablet by mouth once daily    Janumet -1,000 mg TM24 Take 1 tablet by mouth once daily    OTHER hemeopathic leg cramps    nitroglycerin (NITROSTAT) 0.4 mg SL tablet 1 Tab by SubLINGual route every five (5) minutes as needed for Chest Pain.  sildenafil citrate (VIAGRA) 25 mg tablet Take 1 Tab by mouth as needed (ED).  famotidine (PEPCID) 20 mg tablet Take 20 mg by mouth daily.  cholecalciferol, vitamin D3, (VITAMIN D3) 2,000 unit tab Take  by mouth daily.  acetaminophen (TYLENOL) 325 mg tablet Take 1,000 mg by mouth every four (4) hours as needed for Pain.  aspirin 81 mg chewable tablet Take 81 mg by mouth daily. No current facility-administered medications for this visit. No Known Allergies  Review of Systems:  - Eyes: no blurry vision or double vision  - Cardiovascular: no chest pain  - Respiratory: no shortness of breath  - Musculoskeletal: no myalgias  - Neurological: no numbness/tingling in extremities    Physical Examination:  There were no vitals taken for this visit. None    Data Reviewed:   None    Assessment/Plan:   1) DM > Pt is not checking his BGs, he is also not having any issues of low BGs. His goal A1C is under 8.0%.   For now pt to continue the Lantus 10 units daily, Glipizide ER 10mg daily and Janumet XR to 100/1000mg daliy. Will order an A1C, lipid panel, CMP and urine MA. Pt to check his blood sugars twice per day. Pt to cut out the sodas and sweet tea. 2) HTN > He is on an ACEI for renal protection. Will not make any changes at this time. Pt voices understanding and agreement with the plan. We spent 12 minutes of total time together during this virtual visit with a telephone encounter. All questions were answered and a copy of the instructions were sent to her via [employer]/a letter. RTC October     Copy sent to:  Jah Marsh and Javier

## 2021-07-01 ENCOUNTER — VIRTUAL VISIT (OUTPATIENT)
Dept: FAMILY MEDICINE CLINIC | Age: 66
End: 2021-07-01
Payer: MEDICARE

## 2021-07-01 ENCOUNTER — TELEPHONE (OUTPATIENT)
Dept: FAMILY MEDICINE CLINIC | Age: 66
End: 2021-07-01

## 2021-07-01 DIAGNOSIS — M25.562 ACUTE PAIN OF LEFT KNEE: Primary | ICD-10-CM

## 2021-07-01 PROCEDURE — 99442 PR PHYS/QHP TELEPHONE EVALUATION 11-20 MIN: CPT | Performed by: FAMILY MEDICINE

## 2021-07-01 NOTE — PROGRESS NOTES
Ana Laura Andrade is a 76 y.o. male evaluated via telephone on 7/1/2021. Consent:  He and/or health care decision maker is aware that he may receive a bill for this telephone service, depending on his insurance coverage, and has provided verbal consent to proceed: Yes      Documentation:  I communicated with the patient and/or health care decision maker about;   Details of this discussion including any medical advice provided:      has a past medical history of Blind right eye (6/5/2019), CAD (coronary artery disease), COPD, Coronary artery disease with history of myocardial infarction without history of CABG (6/5/2019), CVA (cerebral infarction) (3/24/2012), Diabetes ([facility] [address] Essential hypertension (6/5/2019), Gastroesophageal reflux disease without esophagitis (6/5/2019), MI (myocardial infarction) ([address] Mixed hyperlipidemia (6/5/2019), Stroke ([address] Thromboembolism ([address] (6/5/2019), and Thromboembolus ([address] Left knee pain. On and off, but the past week a lot week. Denies any new injuries or trauma. Just pain from hip to ankle. Side and back. Swelling in knee area. No redness. Painful to the point of taking to the floor. Walked into the back of truck a while ago. He was referring to 08/2019 but at that time he quoted [facility] not knee and we X-rayed his SHIN and US his legs for DVT which both were negative. Will refer to ORthopedic. Discussed extremely difficult eval pain from hip to ankle but mostly in knee over the phone. Diagnoses and all orders for this visit:    1. Acute pain of left knee  -     REFERRAL TO ORTHOPEDICS      Follow-up and Dispositions    · Return for for chronic medical conditions, or to ER sooner as needed.          Past Medical History:   Diagnosis Date    Blind right eye 6/5/2019    CAD (coronary artery disease)     COPD     Coronary artery disease with history of myocardial infarction without history of CABG 6/5/2019    CVA (cerebral infarction) 3/24/2012    Diabetes ([facility]     Essential hypertension 6/5/2019    Gastroesophageal reflux disease without esophagitis 6/5/2019    MI (myocardial infarction) ([facility]     Mixed hyperlipidemia 6/5/2019    Stroke ([facility]     Thromboembolism ([facility] 6/5/2019    Thromboembolus ([facility]        Current Outpatient Medications on File Prior to Visit   Medication Sig Dispense Refill    chlorthalidone (HYGROTON) 25 mg tablet Take 1 Tablet by mouth daily. 90 Tablet 1    amLODIPine (NORVASC) 10 mg tablet Take 1 tablet by mouth once daily 90 Tablet 1    nebivoloL (BYSTOLIC) 20 mg tablet Take 1 tad daily (please notify pt of this change) 90 Tablet 1    lisinopriL (PRINIVIL, ZESTRIL) 40 mg tablet TAKE 1 TABLET BY MOUTH ONCE DAILY FOR BLOOD PRESSURE AND  KIDNEY  PROTECTION 90 Tablet 1    atorvastatin (LIPITOR) 40 mg tablet Take 1 tablet by mouth once daily 90 Tablet 1    potassium chloride SR (KLOR-CON 10) 10 mEq tablet Take 1 tablet by mouth once daily 90 Tab 3    insulin glargine (Lantus Solostar U-100 Insulin) 100 unit/mL (3 mL) inpn INJECT 10 UNITS SUBCUTANEOUSLY ONCE DAILY 15 mL 3    glipiZIDE SR (GLUCOTROL XL) 10 mg CR tablet Take 1 tablet by mouth once daily 90 Tab 3    clotrimazole-betamethasone (LOTRISONE) 1-0.05 % lotion Apply to the effected area every morning 30 mL 1    Janumet -1,000 mg TM24 Take 1 tablet by mouth once daily 90 Tab 3    OTHER hemeopathic leg cramps      famotidine (PEPCID) 20 mg tablet Take 20 mg by mouth daily.  cholecalciferol, vitamin D3, (VITAMIN D3) 2,000 unit tab Take  by mouth daily.  acetaminophen (TYLENOL) 325 mg tablet Take 1,000 mg by mouth every four (4) hours as needed for Pain.  aspirin 81 mg chewable tablet Take 81 mg by mouth daily.         sildenafil citrate (VIAGRA) 25 mg tablet Take 1 Tablet by mouth as needed (ED). (Patient not taking: Reported on 7/1/2021) 30 Tablet 1    nitroglycerin (NITROSTAT) 0.4 mg SL tablet 1 Tab by SubLINGual route every five (5) minutes as needed for Chest Pain. (Patient not taking: Reported on 5/20/2021) 1 Bottle 1     No current facility-administered medications on file prior to visit. I affirm this is a Patient Initiated Episode with a Patient who has not had a related appointment within my department in the past 7 days or scheduled within the next 24 hours.     Total Time: minutes: 11-20 minutes    Note: not billable if this call serves to triage the patient into an appointment for the relevant concern      [place] MD [place]

## 2021-07-01 NOTE — TELEPHONE ENCOUNTER
----- Message from Jaylen Rios sent at 7/1/2021  9:41 AM EDT -----  Regarding: Dr. Sands Both      General Message/Vendor Calls    Caller's first and last name: Ms. James Samano      Reason for call: Calling back in regarding being seen. Callback required yes/no and why: Yes to assist.       Best contact number(s): [phone]      Details to clarify the request: Pt girlfriend checking status of patient being seen with Dr. Ramez Muñoz. Ms. James Samano advised pt will need to be referred to an orthopedic doctor if he can't be seen in practice today. Pt is in extreme pain and does not want to go to emergency room.        Jaylen Rios

## 2021-07-01 NOTE — PROGRESS NOTES
Chief Complaint   Patient presents with    Knee Pain     left knee hurting, now goes into whole leg ankle to hip       1. Have you been to the ER, urgent care clinic since your last visit? Hospitalized since your last visit? No     2. Have you seen or consulted any other health care providers outside of the [address] [place] [address] since your last visit? Include any pap smears or colon screening.  No

## 2021-07-01 NOTE — TELEPHONE ENCOUNTER
Spoke with girlfriend. Patient having knee pain which is now going from hip to ankle. Virtual appt scheduled for today.

## 2021-07-28 ENCOUNTER — TRANSCRIBE ORDER (OUTPATIENT)
Dept: SCHEDULING | Age: 66
End: 2021-07-28

## 2021-07-28 DIAGNOSIS — M23.92 INTERNAL DERANGEMENT OF LEFT KNEE: Primary | ICD-10-CM

## 2021-10-28 ENCOUNTER — OFFICE VISIT (OUTPATIENT)
Dept: CARDIOLOGY CLINIC | Age: 66
End: 2021-10-28
Payer: MEDICARE

## 2021-10-28 VITALS
DIASTOLIC BLOOD PRESSURE: 78 MMHG | HEIGHT: 69 IN | HEART RATE: 67 BPM | RESPIRATION RATE: 16 BRPM | WEIGHT: 183.7 LBS | BODY MASS INDEX: 27.21 KG/M2 | OXYGEN SATURATION: 99 % | SYSTOLIC BLOOD PRESSURE: 126 MMHG

## 2021-10-28 DIAGNOSIS — I10 ESSENTIAL HYPERTENSION: ICD-10-CM

## 2021-10-28 DIAGNOSIS — E78.2 MIXED HYPERLIPIDEMIA: ICD-10-CM

## 2021-10-28 DIAGNOSIS — I50.22 CHRONIC SYSTOLIC HEART FAILURE (HCC): ICD-10-CM

## 2021-10-28 DIAGNOSIS — Z95.1 S/P CABG X 4: ICD-10-CM

## 2021-10-28 DIAGNOSIS — I25.10 CORONARY ARTERY DISEASE INVOLVING NATIVE CORONARY ARTERY OF NATIVE HEART WITHOUT ANGINA PECTORIS: Primary | ICD-10-CM

## 2021-10-28 PROBLEM — I74.9 THROMBOEMBOLISM (HCC): Status: RESOLVED | Noted: 2019-06-05 | Resolved: 2021-10-28

## 2021-10-28 PROCEDURE — G8510 SCR DEP NEG, NO PLAN REQD: HCPCS | Performed by: INTERNAL MEDICINE

## 2021-10-28 PROCEDURE — G8419 CALC BMI OUT NRM PARAM NOF/U: HCPCS | Performed by: INTERNAL MEDICINE

## 2021-10-28 PROCEDURE — G0463 HOSPITAL OUTPT CLINIC VISIT: HCPCS | Performed by: INTERNAL MEDICINE

## 2021-10-28 PROCEDURE — 3017F COLORECTAL CA SCREEN DOC REV: CPT | Performed by: INTERNAL MEDICINE

## 2021-10-28 PROCEDURE — G8427 DOCREV CUR MEDS BY ELIG CLIN: HCPCS | Performed by: INTERNAL MEDICINE

## 2021-10-28 PROCEDURE — 93005 ELECTROCARDIOGRAM TRACING: CPT | Performed by: INTERNAL MEDICINE

## 2021-10-28 PROCEDURE — 93010 ELECTROCARDIOGRAM REPORT: CPT | Performed by: INTERNAL MEDICINE

## 2021-10-28 PROCEDURE — G8536 NO DOC ELDER MAL SCRN: HCPCS | Performed by: INTERNAL MEDICINE

## 2021-10-28 PROCEDURE — 1101F PT FALLS ASSESS-DOCD LE1/YR: CPT | Performed by: INTERNAL MEDICINE

## 2021-10-28 PROCEDURE — 99213 OFFICE O/P EST LOW 20 MIN: CPT | Performed by: INTERNAL MEDICINE

## 2021-10-28 PROCEDURE — G8752 SYS BP LESS 140: HCPCS | Performed by: INTERNAL MEDICINE

## 2021-10-28 PROCEDURE — G8754 DIAS BP LESS 90: HCPCS | Performed by: INTERNAL MEDICINE

## 2021-10-28 RX ORDER — MELOXICAM 15 MG/1
TABLET ORAL
COMMUNITY
Start: 2021-10-20 | End: 2022-11-03

## 2021-10-28 RX ORDER — PSYLLIUM HUSK 0.4 G
CAPSULE ORAL DAILY
COMMUNITY

## 2021-10-28 RX ORDER — CELECOXIB 200 MG/1
200 CAPSULE ORAL DAILY
COMMUNITY
Start: 2021-10-20

## 2021-10-28 RX ORDER — PREGABALIN 75 MG/1
75 CAPSULE ORAL 2 TIMES DAILY
COMMUNITY
Start: 2021-09-27 | End: 2022-06-10 | Stop reason: ALTCHOICE

## 2021-10-28 NOTE — LETTER
10/28/2021    Patient: Haley Wright   YOB: 1955   Date of Visit: 10/28/2021     Cheng Whitaker MD  [address]  [address] [facility] [address]    Dear Cheng Whitaker MD,      Thank you for referring Mr. Patty Lundy to [address] [place] [address] for evaluation. My notes for this consultation are attached. If you have questions, please do not hesitate to call me. I look forward to following your patient along with you.       Sincerely,    Agustín Workman MD

## 2021-10-28 NOTE — PROGRESS NOTES
Pravin Flores, [facility]    Subjective/HPI:     Dian Todd is a 78 y.o. male is here for routine f/u. He has a PMHx of CAD s/p CABGx4, DM2, CVA, HTN and HLD. Feels well. Has been having pain in his knees and ankles, is seeing Dr. Guillermina Ruth for this. Was given some medication which has been helping. Otherwise denies complaints of chest pains, dizziness, orthopnea, PND or edema. Denies palpitations or shortness of breath. Is due for labs; is trying to see if he can get all the labs needed drawn at one time, to avoid the lab fees. Is scheduled to see PCP next week, but also needs labs by endo. Current Outpatient Medications on File Prior to Visit   Medication Sig Dispense Refill    meloxicam (MOBIC) 15 mg tablet 1/2 to 1 tablets by mouth once daily with food as needed for pain      pregabalin (LYRICA) 75 mg capsule Take 75 mg by mouth two (2) times a day.  celecoxib (CELEBREX) 200 mg capsule Take 200 mg by mouth daily.  psyllium husk 0.4 gram cap Take  by mouth daily.  Janumet -1,000 mg TM24 Take 1 tablet by mouth once daily 90 Tablet 0    chlorthalidone (HYGROTON) 25 mg tablet Take 1 Tablet by mouth daily. 90 Tablet 1    amLODIPine (NORVASC) 10 mg tablet Take 1 tablet by mouth once daily 90 Tablet 1    nebivoloL (BYSTOLIC) 20 mg tablet Take 1 tad daily (please notify pt of this change) 90 Tablet 1    lisinopriL (PRINIVIL, ZESTRIL) 40 mg tablet TAKE 1 TABLET BY MOUTH ONCE DAILY FOR BLOOD PRESSURE AND  KIDNEY  PROTECTION 90 Tablet 1    atorvastatin (LIPITOR) 40 mg tablet Take 1 tablet by mouth once daily 90 Tablet 1    sildenafil citrate (VIAGRA) 25 mg tablet Take 1 Tablet by mouth as needed (ED).  30 Tablet 1    potassium chloride SR (KLOR-CON 10) 10 mEq tablet Take 1 tablet by mouth once daily 90 Tab 3    insulin glargine (Lantus Solostar U-100 Insulin) 100 unit/mL (3 mL) inpn INJECT 10 UNITS SUBCUTANEOUSLY ONCE DAILY 15 mL 3    glipiZIDE SR (GLUCOTROL XL) 10 mg CR tablet Take 1 tablet by mouth once daily 90 Tab 3    clotrimazole-betamethasone (LOTRISONE) 1-0.05 % lotion Apply to the effected area every morning 30 mL 1    OTHER hemeopathic leg cramps      famotidine (PEPCID) 20 mg tablet Take 20 mg by mouth daily.  cholecalciferol, vitamin D3, (VITAMIN D3) 2,000 unit tab Take  by mouth daily.  acetaminophen (TYLENOL) 325 mg tablet Take 1,000 mg by mouth every four (4) hours as needed for Pain.  aspirin 81 mg chewable tablet Take 81 mg by mouth daily.  [DISCONTINUED] nitroglycerin (NITROSTAT) 0.4 mg SL tablet 1 Tab by SubLINGual route every five (5) minutes as needed for Chest Pain. (Patient not taking: Reported on 5/20/2021) 1 Bottle 1     No current facility-administered medications on file prior to visit. Review of Symptoms:    Review of Systems   Constitutional: Negative for chills, fever and weight loss. HENT: Negative for nosebleeds. Eyes: Negative for blurred vision and double vision. Respiratory: Negative for cough, shortness of breath and wheezing. Cardiovascular: Negative for chest pain, palpitations, orthopnea, leg swelling and PND. Skin: Negative for rash. Neurological: Negative for dizziness and loss of consciousness. Physical Exam:      General: Well developed, in no acute distress, cooperative and alert  Heart:  reg rate and rhythm; normal S1/S2; no murmurs, no gallops or rubs. Respiratory: Clear bilaterally x 4, no wheezing or rales  Extremities:  Normal cap refill, no cyanosis, atraumatic. No edema. Vascular: 2+ pulses symmetric in all extremities    Vitals:    10/28/21 1250   BP: 126/78   BP 1 Location: Left arm   BP Patient Position: Sitting   BP Cuff Size: Adult   Pulse: 67   Resp: 16   Height: 5' 9\" (1.753 m)   Weight: 183 lb 11.2 oz (83.3 kg)   SpO2: 99%       ECG done today shows sinus rhythm     Assessment:       ICD-10-CM ICD-9-CM    1.  Coronary artery disease involving native coronary artery of native heart without angina pectoris  I25.10 414.01 AMB POC EKG ROUTINE W/ 12 LEADS, INTER & REP   2. Chronic systolic heart failure (HCC)  I50.22 428.22 AMB POC EKG ROUTINE W/ 12 LEADS, INTER & REP   3. Essential hypertension  I10 401.9 AMB POC EKG ROUTINE W/ 12 LEADS, INTER & REP   4. Mixed hyperlipidemia  E78.2 272.2 AMB POC EKG ROUTINE W/ 12 LEADS, INTER & REP   5. S/P CABG x 4  Z95.1 V45.81 AMB POC EKG ROUTINE W/ 12 LEADS, INTER & REP        Plan:     1. Coronary artery disease involving native coronary artery of native heart without angina pectoris  S/p CABGx4 with subsequent ARETHA to LCx in 9/2012  Denies anginal or anginal equivalent symptoms  Continue BB, ASA and statin therapy     2. Chronic systolic heart failure (HCC)  Hx of ischemic cardiomyopathy, now resolved  Echo in done 6/2020 with preserved ejection fraction 55-60%  Euvolemic on exam today  Continue with BB, lisinopril     3. Benign essential hypertension  BP controlled.  Continue anti-hypertensive therapy and low sodium diet     4. Mixed hyperlipidemia  Overdue for lipids this year  Continue statin therapy and low fat, low cholesterol diet  Lipids managed by PCP     5. S/P CABG x 4  S/p off pump CABG with VG to OM1, seq VG to PDA and RAY and LIMA to LAD in 5/2012    F/u with Dr. Irma Benson in 1 year    Trisha James NP       [facility]             Patient seen, examined by me personally. Plan discussed as detailed. Agree with note as outlined by  NP with modifications as noted. My independent physical exam reveals : Physical Exam  Vitals and nursing note reviewed. Cardiovascular:      Rate and Rhythm: Normal rate and regular rhythm. Heart sounds: Normal heart sounds. Pulmonary:      Breath sounds: Normal breath sounds. Musculoskeletal:      Right lower leg: No edema. Left lower leg: No edema. Skin:     General: Skin is warm.    Neurological:      Mental Status: He is alert and oriented to person, place, and time. Psychiatric:         Mood and Affect: Mood normal.          No additional findings noted. Agree with plan as outlined above with modifications as noted.      Yessenia Howard MD

## 2021-10-28 NOTE — PROGRESS NOTES
Chief Complaint   Patient presents with    Coronary Artery Disease     Annual F/U; Denies Cardiac Symptoms    CHF    Hypertension    Hyperlipidemia     Visit Vitals  /78 (BP 1 Location: Left arm, BP Patient Position: Sitting, BP Cuff Size: Adult)   Pulse 67   Resp 16   Ht 5' 9\" (1.753 m)   Wt 183 lb 11.2 oz (83.3 kg)   SpO2 99%   BMI 27.13 kg/m²     1. Have you been to the ER, urgent care clinic since your last visit? Hospitalized since your last visit? No    2. Have you seen or consulted any other health care providers outside of the [address] [place] [address] since your last visit? Include any pap smears or colon screening.  No

## 2021-10-29 LAB
ALBUMIN SERPL-MCNC: 4.4 G/DL (ref 3.8–4.8)
ALBUMIN/CREAT UR: 129 MG/G CREAT (ref 0–29)
ALBUMIN/GLOB SERPL: 1.7 {RATIO} (ref 1.2–2.2)
ALP SERPL-CCNC: 87 IU/L (ref 44–121)
ALT SERPL-CCNC: 13 IU/L (ref 0–44)
AST SERPL-CCNC: 10 IU/L (ref 0–40)
BILIRUB SERPL-MCNC: 0.4 MG/DL (ref 0–1.2)
BUN SERPL-MCNC: 18 MG/DL (ref 8–27)
BUN/CREAT SERPL: 19 (ref 10–24)
CALCIUM SERPL-MCNC: 9.2 MG/DL (ref 8.6–10.2)
CHLORIDE SERPL-SCNC: 100 MMOL/L (ref 96–106)
CHOLEST SERPL-MCNC: 124 MG/DL (ref 100–199)
CO2 SERPL-SCNC: 26 MMOL/L (ref 20–29)
CREAT SERPL-MCNC: 0.95 MG/DL (ref 0.76–1.27)
CREAT UR-MCNC: 77.9 MG/DL
ERYTHROCYTE [DISTWIDTH] IN BLOOD BY AUTOMATED COUNT: 13.2 % (ref 11.6–15.4)
EST. AVERAGE GLUCOSE BLD GHB EST-MCNC: 183 MG/DL
GLOBULIN SER CALC-MCNC: 2.6 G/DL (ref 1.5–4.5)
GLUCOSE SERPL-MCNC: 145 MG/DL (ref 65–99)
HBA1C MFR BLD: 8 % (ref 4.8–5.6)
HCT VFR BLD AUTO: 44.3 % (ref 37.5–51)
HDLC SERPL-MCNC: 26 MG/DL
HGB BLD-MCNC: 15.3 G/DL (ref 13–17.7)
IMP & REVIEW OF LAB RESULTS: NORMAL
LDLC SERPL CALC-MCNC: 63 MG/DL (ref 0–99)
MCH RBC QN AUTO: 33.3 PG (ref 26.6–33)
MCHC RBC AUTO-ENTMCNC: 34.5 G/DL (ref 31.5–35.7)
MCV RBC AUTO: 97 FL (ref 79–97)
MICROALBUMIN UR-MCNC: 100.8 UG/ML
PLATELET # BLD AUTO: 248 X10E3/UL (ref 150–450)
POTASSIUM SERPL-SCNC: 3.8 MMOL/L (ref 3.5–5.2)
PROT SERPL-MCNC: 7 G/DL (ref 6–8.5)
RBC # BLD AUTO: 4.59 X10E6/UL (ref 4.14–5.8)
SODIUM SERPL-SCNC: 141 MMOL/L (ref 134–144)
TRIGL SERPL-MCNC: 214 MG/DL (ref 0–149)
TSH SERPL DL<=0.005 MIU/L-ACNC: 1.59 UIU/ML (ref 0.45–4.5)
VLDLC SERPL CALC-MCNC: 35 MG/DL (ref 5–40)
WBC # BLD AUTO: 7.9 X10E3/UL (ref 3.4–10.8)

## 2021-11-21 DIAGNOSIS — I10 ESSENTIAL HYPERTENSION: ICD-10-CM

## 2021-11-22 RX ORDER — CHLORTHALIDONE 25 MG/1
TABLET ORAL
Qty: 90 TABLET | Refills: 0 | Status: SHIPPED | OUTPATIENT
Start: 2021-11-22 | End: 2022-04-04

## 2021-11-22 RX ORDER — LISINOPRIL 40 MG/1
TABLET ORAL
Qty: 90 TABLET | Refills: 0 | Status: SHIPPED | OUTPATIENT
Start: 2021-11-22 | End: 2022-03-08

## 2021-11-22 RX ORDER — NEBIVOLOL 20 MG/1
TABLET ORAL
Qty: 90 TABLET | Refills: 0 | Status: SHIPPED | OUTPATIENT
Start: 2021-11-22 | End: 2022-02-28

## 2021-11-22 NOTE — TELEPHONE ENCOUNTER
Pt Last seen > 4 months ago  Med refill 1 month until f/u  pls call for F/u appointment    thx    Jeramie Julio MD  4/3/2018

## 2021-11-30 ENCOUNTER — OFFICE VISIT (OUTPATIENT)
Dept: ENDOCRINOLOGY | Age: 66
End: 2021-11-30
Payer: MEDICARE

## 2021-11-30 VITALS
HEART RATE: 71 BPM | DIASTOLIC BLOOD PRESSURE: 66 MMHG | BODY MASS INDEX: 26.51 KG/M2 | SYSTOLIC BLOOD PRESSURE: 99 MMHG | WEIGHT: 179 LBS | HEIGHT: 69 IN

## 2021-11-30 DIAGNOSIS — I10 ESSENTIAL HYPERTENSION: ICD-10-CM

## 2021-11-30 DIAGNOSIS — E11.21 TYPE 2 DIABETES MELLITUS WITH DIABETIC NEPHROPATHY, WITH LONG-TERM CURRENT USE OF INSULIN (HCC): Primary | ICD-10-CM

## 2021-11-30 DIAGNOSIS — Z79.4 TYPE 2 DIABETES MELLITUS WITH DIABETIC NEPHROPATHY, WITH LONG-TERM CURRENT USE OF INSULIN (HCC): Primary | ICD-10-CM

## 2021-11-30 DIAGNOSIS — E78.2 MIXED HYPERLIPIDEMIA: ICD-10-CM

## 2021-11-30 PROCEDURE — 2022F DILAT RTA XM EVC RTNOPTHY: CPT | Performed by: INTERNAL MEDICINE

## 2021-11-30 PROCEDURE — 1101F PT FALLS ASSESS-DOCD LE1/YR: CPT | Performed by: INTERNAL MEDICINE

## 2021-11-30 PROCEDURE — 3017F COLORECTAL CA SCREEN DOC REV: CPT | Performed by: INTERNAL MEDICINE

## 2021-11-30 PROCEDURE — 3052F HG A1C>EQUAL 8.0%<EQUAL 9.0%: CPT | Performed by: INTERNAL MEDICINE

## 2021-11-30 PROCEDURE — G8427 DOCREV CUR MEDS BY ELIG CLIN: HCPCS | Performed by: INTERNAL MEDICINE

## 2021-11-30 PROCEDURE — G8536 NO DOC ELDER MAL SCRN: HCPCS | Performed by: INTERNAL MEDICINE

## 2021-11-30 PROCEDURE — G8432 DEP SCR NOT DOC, RNG: HCPCS | Performed by: INTERNAL MEDICINE

## 2021-11-30 PROCEDURE — G8754 DIAS BP LESS 90: HCPCS | Performed by: INTERNAL MEDICINE

## 2021-11-30 PROCEDURE — 99214 OFFICE O/P EST MOD 30 MIN: CPT | Performed by: INTERNAL MEDICINE

## 2021-11-30 PROCEDURE — G8752 SYS BP LESS 140: HCPCS | Performed by: INTERNAL MEDICINE

## 2021-11-30 PROCEDURE — G8419 CALC BMI OUT NRM PARAM NOF/U: HCPCS | Performed by: INTERNAL MEDICINE

## 2021-11-30 RX ORDER — PEN NEEDLE, DIABETIC 31 GX3/16"
NEEDLE, DISPOSABLE MISCELLANEOUS
Qty: 100 PEN NEEDLE | Refills: 3 | Status: SHIPPED | OUTPATIENT
Start: 2021-11-30

## 2021-11-30 RX ORDER — INSULIN GLARGINE 100 [IU]/ML
INJECTION, SOLUTION SUBCUTANEOUS
Qty: 15 ML | Refills: 5 | Status: SHIPPED | OUTPATIENT
Start: 2021-11-30 | End: 2022-11-03 | Stop reason: SDUPTHER

## 2021-11-30 RX ORDER — TRAMADOL HYDROCHLORIDE 50 MG/1
TABLET ORAL
COMMUNITY
Start: 2021-11-10 | End: 2021-11-30

## 2021-11-30 NOTE — PATIENT INSTRUCTIONS
1) For the leg spasms and cramps, get apple cider vinegar. Look for \"Dr. Lenny Krishnamurthy with the mother\". Take a shot of the Vinegar every night before you lay down.

## 2021-11-30 NOTE — PROGRESS NOTES
Chief Complaint   Patient presents with    Diabetes     pcp and pharmacy verified   Records since his last visit reviewed. History of Present Illness: Christ Forbes is a 78 y.o. male here for follow up of diabetes. Pt is currently taking Glipizde ER 10mg in the AM only and Janumet /1000mg in the AM only and Lantus 10 units in the morning. His weight today was 179 pounds. Pt is not checking his BGs, but he has not been having issues of hypoglycemia. He denies issues of CP, SOB, palpitations, polyuria, he notes he has nocturia. No recent illnesses, injuries or hospitalizations. \"I had a tooth broke a couple weeks ago and I had to go get it pulled out. I had the partial palate made, but I have not been using it since I just had the broken tooth and an abscess. \"    Pt is eating 3 meals daily. His largest meal is Dinner. He has breakfast between 8-9AM, today he had sausage, eggs, cheese and coffee (splenda). He will have lunch around 1PM, yesterday he had a ham sandwich, a biscuit, and potato soup. Last night for dinner he had a chicken sandwich, no side items and OJ. He is followed by Dr. Manjula Juan of cardiology. He saw Dr. Virgel Hatchet in October 2021. \"He said every thing was looking good. \"    He stays busy chopping wood and yard work and work around [employer]. He notes he has been walking around in his yard as well. Pt notes he has some weakness in his left leg secondary to his prior CVA. 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 Last eye exam was November 2021 \"he said I had bad cataracts, I guess I am going to have to have the surgery. \"    Current Outpatient Medications   Medication Sig    Janumet -1,000 mg TM24 Take 1 tablet by mouth once daily    chlorthalidone (HYGROTON) 25 mg tablet Take 1 tablet by mouth once daily    lisinopriL (PRINIVIL, ZESTRIL) 40 mg tablet Take 1 tablet by mouth once daily  nebivoloL (BYSTOLIC) 20 mg tablet Take 1 tablet by mouth once daily    glipiZIDE SR (GLUCOTROL XL) 10 mg CR tablet Take 1 tablet by mouth once daily    pregabalin (LYRICA) 75 mg capsule Take 75 mg by mouth two (2) times a day.  celecoxib (CELEBREX) 200 mg capsule Take 200 mg by mouth daily.  psyllium husk 0.4 gram cap Take  by mouth daily.  amLODIPine (NORVASC) 10 mg tablet Take 1 tablet by mouth once daily    atorvastatin (LIPITOR) 40 mg tablet Take 1 tablet by mouth once daily    sildenafil citrate (VIAGRA) 25 mg tablet Take 1 Tablet by mouth as needed (ED).  insulin glargine (Lantus Solostar U-100 Insulin) 100 unit/mL (3 mL) inpn INJECT 10 UNITS SUBCUTANEOUSLY ONCE DAILY    clotrimazole-betamethasone (LOTRISONE) 1-0.05 % lotion Apply to the effected area every morning    OTHER hemeopathic leg cramps    famotidine (PEPCID) 20 mg tablet Take 20 mg by mouth daily.  cholecalciferol, vitamin D3, (VITAMIN D3) 2,000 unit tab Take  by mouth daily.  acetaminophen (TYLENOL) 325 mg tablet Take 1,000 mg by mouth every four (4) hours as needed for Pain.  aspirin 81 mg chewable tablet Take 81 mg by mouth daily.  meloxicam (MOBIC) 15 mg tablet 1/2 to 1 tablets by mouth once daily with food as needed for pain     No current facility-administered medications for this visit. No Known Allergies  Review of Systems:  - Eyes: no blurry vision or double vision  - Cardiovascular: no chest pain  - Respiratory: no shortness of breath  - Musculoskeletal: no myalgias  - Neurological: no numbness/tingling in extremities    Physical Examination:  Blood pressure 99/66, pulse 71, height 5' 9\" (1.753 m), weight 179 lb (81.2 kg).   - General: pleasant, no distress, good eye contact   - Neck: no carotid bruits  - Cardiovascular: regular, normal rate, nl s1 and s2, no m/r/g, 2+ DP pulses   - Respiratory: clear bilaterally  - Integumentary: no edema, no foot ulcers  - Psychiatric: normal mood and affect    Diabetic foot exam:     Left Foot:   Visual Exam: callous - present   Pulse DP: 2+ (normal)   Filament test: normal sensation    Vibratory sensation: normal      Right Foot:   Visual Exam: callous - present   Pulse DP: 2+ (normal)   Filament test: normal sensation    Vibratory sensation: normal        Data Reviewed:   Component      Latest Ref Rng & Units 10/28/2021   Glucose      65 - 99 mg/dL 145 (H)   BUN      8 - 27 mg/dL 18   Creatinine      0.76 - 1.27 mg/dL 0.95   GFR est non-AA      >59 mL/min/1.73 82   GFR est AA      >59 mL/min/1.73 95   BUN/Creatinine ratio      10 - 24 19   Sodium      134 - 144 mmol/L 141   Potassium      3.5 - 5.2 mmol/L 3.8   Chloride      96 - 106 mmol/L 100   CO2      20 - 29 mmol/L 26   Calcium      8.6 - 10.2 mg/dL 9.2   Protein, total      6.0 - 8.5 g/dL 7.0   Albumin      3.8 - 4.8 g/dL 4.4   GLOBULIN, TOTAL      1.5 - 4.5 g/dL 2.6   A-G Ratio      1.2 - 2.2 1.7   Bilirubin, total      0.0 - 1.2 mg/dL 0.4   Alk. phosphatase      44 - 121 IU/L 87   AST      0 - 40 IU/L 10   ALT      0 - 44 IU/L 13   WBC      3.4 - 10.8 x10E3/uL 7.9   RBC      4.14 - 5.80 x10E6/uL 4.59   HGB      13.0 - 17.7 g/dL 15.3   HCT      37.5 - 51.0 % 44.3   MCV      79 - 97 fL 97   MCH      26.6 - 33.0 pg 33.3 (H)   MCHC      31.5 - 35.7 g/dL 34.5   RDW      11.6 - 15.4 % 13.2   PLATELET      710 - 283 x10E3/uL 248   Cholesterol, total      100 - 199 mg/dL 124   Triglyceride      0 - 149 mg/dL 214 (H)   HDL Cholesterol      >39 mg/dL 26 (L)   VLDL, calculated      5 - 40 mg/dL 35   LDL, calculated      0 - 99 mg/dL 63   Creatinine, urine      Not Estab. mg/dL 77.9   Microalbumin, urine      Not Estab. ug/mL 100.8   Microalbumin/Creat.  Ratio      0 - 29 mg/g creat 129 (H)   Hemoglobin A1c, (calculated)      4.8 - 5.6 % 8.0 (H)   Estimated average glucose      mg/dL 183   TSH      0.450 - 4.500 uIU/mL 1.590       Assessment/Plan:   1) DM > Pt is not checking his BGs, he is also not having any issues of low BGs. His goal A1C is 8.0% or less. His A1C in October 2021 was 8.0%. When we have tried more aggressive or higher dose of his insulin he has had hypoglycemia. I do not feel we need to make any changes at this time. For now pt to continue the Lantus 10 units daily, Glipizide ER 10mg daily and Janumet XR to 100/1000mg daliy. Pt to check his blood sugars twice per day. Pt to cut out the sodas and sweet tea. 2) HTN > His BP today was 99/66. He is on an ACEI for renal protection. Will not make any changes at this time. Pt voices understanding and agreement with the plan. Follow-up and Dispositions    · Return in about 6 months (around 5/30/2022). RTC 6 months     Copy sent to:  Jah Marsh and Javier

## 2021-11-30 NOTE — LETTER
11/30/2021    Patient: Meg Winston   YOB: 1955   Date of Visit: 11/30/2021     Pamela Garzon MD  [address]  [place]    Dear Pamela Garzon MD,      Thank you for referring Mr. Amanda Castrejon to [address] [place] [address] for evaluation. My notes for this consultation are attached. If you have questions, please do not hesitate to call me. I look forward to following your patient along with you.       Sincerely,    Zeinab August MD

## 2021-12-03 ENCOUNTER — VIRTUAL VISIT (OUTPATIENT)
Dept: FAMILY MEDICINE CLINIC | Age: 66
End: 2021-12-03
Payer: MEDICARE

## 2021-12-03 DIAGNOSIS — I10 ESSENTIAL HYPERTENSION: ICD-10-CM

## 2021-12-03 DIAGNOSIS — Z00.00 MEDICARE ANNUAL WELLNESS VISIT, SUBSEQUENT: Primary | ICD-10-CM

## 2021-12-03 DIAGNOSIS — Z72.0 TOBACCO ABUSE DISORDER: ICD-10-CM

## 2021-12-03 DIAGNOSIS — Z91.199 PATIENT NON-COMPLIANT, REFUSED INTERVENTION OR SUPPORT: ICD-10-CM

## 2021-12-03 DIAGNOSIS — I73.9 CLAUDICATION IN PERIPHERAL VASCULAR DISEASE (HCC): ICD-10-CM

## 2021-12-03 DIAGNOSIS — E78.2 MIXED HYPERLIPIDEMIA: ICD-10-CM

## 2021-12-03 DIAGNOSIS — Z72.0 NOT READY TO QUIT SMOKING: ICD-10-CM

## 2021-12-03 PROCEDURE — 1101F PT FALLS ASSESS-DOCD LE1/YR: CPT | Performed by: FAMILY MEDICINE

## 2021-12-03 PROCEDURE — G8427 DOCREV CUR MEDS BY ELIG CLIN: HCPCS | Performed by: FAMILY MEDICINE

## 2021-12-03 PROCEDURE — 3017F COLORECTAL CA SCREEN DOC REV: CPT | Performed by: FAMILY MEDICINE

## 2021-12-03 PROCEDURE — G0439 PPPS, SUBSEQ VISIT: HCPCS | Performed by: FAMILY MEDICINE

## 2021-12-03 PROCEDURE — G8510 SCR DEP NEG, NO PLAN REQD: HCPCS | Performed by: FAMILY MEDICINE

## 2021-12-03 PROCEDURE — G8419 CALC BMI OUT NRM PARAM NOF/U: HCPCS | Performed by: FAMILY MEDICINE

## 2021-12-03 PROCEDURE — G8756 NO BP MEASURE DOC: HCPCS | Performed by: FAMILY MEDICINE

## 2021-12-03 PROCEDURE — G8536 NO DOC ELDER MAL SCRN: HCPCS | Performed by: FAMILY MEDICINE

## 2021-12-03 RX ORDER — ATORVASTATIN CALCIUM 40 MG/1
TABLET, FILM COATED ORAL
Qty: 90 TABLET | Refills: 1 | Status: SHIPPED | OUTPATIENT
Start: 2021-12-03 | End: 2022-06-10 | Stop reason: SDUPTHER

## 2021-12-03 RX ORDER — AMLODIPINE BESYLATE 10 MG/1
TABLET ORAL
Qty: 90 TABLET | Refills: 1 | Status: SHIPPED | OUTPATIENT
Start: 2021-12-03 | End: 2022-06-10 | Stop reason: SDUPTHER

## 2021-12-03 RX ORDER — CHLORHEXIDINE GLUCONATE 1.2 MG/ML
RINSE ORAL
COMMUNITY
Start: 2021-11-10 | End: 2022-06-03

## 2021-12-03 NOTE — PROGRESS NOTES
Effie Hill is a 78 y.o. male evaluated via telephone on 12/3/2021. Consent:  He and/or health care decision maker is aware that he may receive a bill for this telephone service, depending on his insurance coverage, and has provided verbal consent to proceed: Yes      Documentation:  I communicated with the patient and/or health care decision maker about;   Details of this discussion including any medical advice provided: This is a Cally-Marc Exam (AWV)     I have reviewed the patient's medical history in detail and updated the computerized patient record. Last seen this pt > 6 months ago    [place] It's been almost 12 months without any labs. Advised I needed it and can't wait another 6 months. He's on a lot of meds that needs labs monitoring. Vaccinated for Alverto recently       has a past medical history of Blind right eye (6/5/2019), CAD (coronary artery disease), COPD, Coronary artery disease with history of myocardial infarction without history of CABG (6/5/2019), CVA (cerebral infarction) (3/24/2012), Diabetes ([facility] [address] Essential hypertension (6/5/2019), Gastroesophageal reflux disease without esophagitis (6/5/2019), MI (myocardial infarction) ([address] Mixed hyperlipidemia (6/5/2019), Stroke ([address] Thromboembolism ([facility] [address] (6/5/2019), and Thromboembolus ([address] Claudication symptoms calves cramping when sleeping, have to get up and walk around. He says ortho looked at his knees and that's managed. We discussed it's different and we can order INGE or refer to vasc surgery. He refuses for now. HTN: BP at goal              Continue amlodipine, bystolic, hygroten, Lisinopril, Potassium     HLD: Lipitor     COPD: breathing have been fine. He haven't need an inhaler for many months. Smoker 55 years, most of his life 2ppd. attempt at counseling to quit.   \"i'm gonna keep on smoking\".      He sees cardiologist Jose J.   CAD, MI, s/p CABG     Dr. Mariano Knight endocrinologist.  DM2: A1C 8.0% 10/2021, 11.4% 06/2020,  8.4% 12/2019, 11.1%              Lantus, Janumet, Glipizide SR     CVA: Effected left side.  Right eye blindness.        He still doesn't want Colonoscopy. Previous Dr. Sade Kumar  Discussed colorguard he also refuses    Discussed AAA screening due to smoking - he refuses    He refuses flu, pneumo, tdap and shingles. Alcohol Risk Factor Screening: You do not drink alcohol or very rarely. Functional Ability and Level of Safety:     Hearing Loss   The patient needs further evaluation. Activities of Daily Living   Self-care. Requires assistance with: no ADLs    Fall Risk   low    Abuse Screen   Patient is not abused    Review of Systems   A comprehensive review of systems was negative except for that written in the HPI. Physical Examination     Evaluation of Cognitive Function:  Mood/affect:  neutral, happy      Advice/Referrals/Counseling   Education and counseling provided:  Are appropriate based on today's review and evaluation  End-of-Life planning (with patient's consent)  Pneumococcal Vaccine  Influenza Vaccine  Prostate cancer screening tests (PSA, covered annually)  Colorectal cancer screening tests  Bone mass measurement (DEXA)  Screening for glaucoma  Diabetes outpatient self-management training services      Diagnoses and all orders for this visit:    1. Medicare annual wellness visit, subsequent    2. Essential hypertension  -     amLODIPine (NORVASC) 10 mg tablet; Take 1 tablet by mouth once daily    3. Claudication in peripheral vascular disease ([facility] [address]    4. Tobacco abuse disorder    5. Not ready to quit smoking    6. Mixed hyperlipidemia  -     atorvastatin (LIPITOR) 40 mg tablet; Take 1 tablet by mouth once daily    7. Patient non-compliant, refused intervention or support      Follow-up and Dispositions    · Return in about 6 months (around 6/3/2022) for HTN, HLD, meds, sooner as needed.          I affirm this is a Patient Initiated Episode with a Patient who has not had a related appointment within my department in the past 7 days or scheduled within the next 24 hours.     Total Time: minutes: 21-30 minutes    Note: not billable if this call serves to triage the patient into an appointment for the relevant concern      Natalia Dean MD

## 2021-12-03 NOTE — PROGRESS NOTES
Chief Complaint   Patient presents with    Hypertension    Cholesterol Problem     1. Have you been to the ER, urgent care clinic since your last visit? Hospitalized since your last visit? No     2. Have you seen or consulted any other health care providers outside of the [address] [place] [address] since your last visit? Include any pap smears or colon screening.  Yes

## 2022-02-25 DIAGNOSIS — I10 ESSENTIAL HYPERTENSION: ICD-10-CM

## 2022-02-28 RX ORDER — NEBIVOLOL 20 MG/1
TABLET ORAL
Qty: 90 TABLET | Refills: 0 | Status: SHIPPED | OUTPATIENT
Start: 2022-02-28 | End: 2022-06-10 | Stop reason: SDUPTHER

## 2022-03-08 DIAGNOSIS — I10 ESSENTIAL HYPERTENSION: ICD-10-CM

## 2022-03-08 RX ORDER — LISINOPRIL 40 MG/1
TABLET ORAL
Qty: 90 TABLET | Refills: 0 | Status: SHIPPED | OUTPATIENT
Start: 2022-03-08 | End: 2022-06-10 | Stop reason: SDUPTHER

## 2022-03-18 PROBLEM — K21.9 GASTROESOPHAGEAL REFLUX DISEASE WITHOUT ESOPHAGITIS: Status: ACTIVE | Noted: 2019-06-05

## 2022-03-18 PROBLEM — Z79.899 ENCOUNTER FOR LONG-TERM (CURRENT) USE OF MEDICATIONS: Status: ACTIVE | Noted: 2019-06-05

## 2022-03-18 PROBLEM — H54.40 BLIND RIGHT EYE: Status: ACTIVE | Noted: 2019-06-05

## 2022-03-19 PROBLEM — E11.21 TYPE 2 DIABETES WITH NEPHROPATHY (HCC): Status: ACTIVE | Noted: 2018-06-11

## 2022-03-19 PROBLEM — Z86.73 HISTORY OF STROKE: Status: ACTIVE | Noted: 2019-08-12

## 2022-03-19 PROBLEM — E11.8 TYPE 2 DIABETES MELLITUS WITH COMPLICATION, WITH LONG-TERM CURRENT USE OF INSULIN (HCC): Status: ACTIVE | Noted: 2017-11-21

## 2022-03-19 PROBLEM — Z79.4 TYPE 2 DIABETES MELLITUS WITH COMPLICATION, WITH LONG-TERM CURRENT USE OF INSULIN (HCC): Status: ACTIVE | Noted: 2017-11-21

## 2022-04-04 DIAGNOSIS — I10 ESSENTIAL HYPERTENSION: ICD-10-CM

## 2022-04-04 RX ORDER — CHLORTHALIDONE 25 MG/1
TABLET ORAL
Qty: 90 TABLET | Refills: 0 | Status: SHIPPED | OUTPATIENT
Start: 2022-04-04 | End: 2022-06-10 | Stop reason: SDUPTHER

## 2022-06-03 ENCOUNTER — OFFICE VISIT (OUTPATIENT)
Dept: ENDOCRINOLOGY | Age: 67
End: 2022-06-03
Payer: MEDICARE

## 2022-06-03 VITALS
WEIGHT: 179 LBS | HEART RATE: 75 BPM | DIASTOLIC BLOOD PRESSURE: 81 MMHG | SYSTOLIC BLOOD PRESSURE: 128 MMHG | HEIGHT: 69 IN | BODY MASS INDEX: 26.51 KG/M2

## 2022-06-03 DIAGNOSIS — Z79.4 TYPE 2 DIABETES MELLITUS WITH DIABETIC NEPHROPATHY, WITH LONG-TERM CURRENT USE OF INSULIN (HCC): Primary | ICD-10-CM

## 2022-06-03 DIAGNOSIS — E78.2 MIXED HYPERLIPIDEMIA: ICD-10-CM

## 2022-06-03 DIAGNOSIS — I10 ESSENTIAL HYPERTENSION: ICD-10-CM

## 2022-06-03 DIAGNOSIS — E11.21 TYPE 2 DIABETES MELLITUS WITH DIABETIC NEPHROPATHY, WITH LONG-TERM CURRENT USE OF INSULIN (HCC): Primary | ICD-10-CM

## 2022-06-03 LAB — HBA1C MFR BLD HPLC: 7.2 %

## 2022-06-03 PROCEDURE — 83036 HEMOGLOBIN GLYCOSYLATED A1C: CPT | Performed by: INTERNAL MEDICINE

## 2022-06-03 PROCEDURE — G8754 DIAS BP LESS 90: HCPCS | Performed by: INTERNAL MEDICINE

## 2022-06-03 PROCEDURE — 1123F ACP DISCUSS/DSCN MKR DOCD: CPT | Performed by: INTERNAL MEDICINE

## 2022-06-03 PROCEDURE — G8536 NO DOC ELDER MAL SCRN: HCPCS | Performed by: INTERNAL MEDICINE

## 2022-06-03 PROCEDURE — 3017F COLORECTAL CA SCREEN DOC REV: CPT | Performed by: INTERNAL MEDICINE

## 2022-06-03 PROCEDURE — G8432 DEP SCR NOT DOC, RNG: HCPCS | Performed by: INTERNAL MEDICINE

## 2022-06-03 PROCEDURE — G8417 CALC BMI ABV UP PARAM F/U: HCPCS | Performed by: INTERNAL MEDICINE

## 2022-06-03 PROCEDURE — 99214 OFFICE O/P EST MOD 30 MIN: CPT | Performed by: INTERNAL MEDICINE

## 2022-06-03 PROCEDURE — G8752 SYS BP LESS 140: HCPCS | Performed by: INTERNAL MEDICINE

## 2022-06-03 PROCEDURE — 1101F PT FALLS ASSESS-DOCD LE1/YR: CPT | Performed by: INTERNAL MEDICINE

## 2022-06-03 PROCEDURE — G8427 DOCREV CUR MEDS BY ELIG CLIN: HCPCS | Performed by: INTERNAL MEDICINE

## 2022-06-03 PROCEDURE — 2022F DILAT RTA XM EVC RTNOPTHY: CPT | Performed by: INTERNAL MEDICINE

## 2022-06-03 PROCEDURE — 3051F HG A1C>EQUAL 7.0%<8.0%: CPT | Performed by: INTERNAL MEDICINE

## 2022-06-03 RX ORDER — POTASSIUM CHLORIDE 750 MG/1
10 TABLET, FILM COATED, EXTENDED RELEASE ORAL DAILY
COMMUNITY
Start: 2022-02-24 | End: 2022-06-03

## 2022-06-03 NOTE — PROGRESS NOTES
Chief Complaint   Patient presents with    Diabetes     pcp and pharmacy verified   Records since his last visit reviewed. History of Present Illness: Eli Harvey is a 78 y.o. male here for follow up of diabetes. His weight today was 179 pounds. He has cataract surgery in March 2022 \"I have to go back in follow up, but I can see better. It also helped my blind eye as well. \"    Pt is not checking his BGs, but he has not been having issues of hypoglycemia. Pt notes he decreased his Lantus to 9 units daily, because he was concerned with low BGs, he is still taking the [facility], YIFAN ER 10mg in the AM only and Janumet /1000mg in the AM only. He denies issues of CP, SOB, palpitations, polyuria, he notes he has nocturia. \"I had a tooth broke a couple weeks ago and I had to go get it pulled out. I had the partial palate made, but I have not been using it, it did not fit right. I have to go back next week and they may need to pull some more teeth. \"    Pt is eating 3 meals daily. His largest meal is Dinner. He has breakfast between 8-9AM, today he had eggs, suazo, Kiswahili toast, potatoes and diet soda. He will have lunch around 1PM, yesterday he had a bologna and egg sandwich and water. He has supper around 6-7PM, last night he had fish strips, no side items and regular soda. He is followed by Dr. Chemo Quintana of cardiology. He saw Dr. Kameron Boykin in October 2021. \"He said every thing was looking good and he said to come in every year now. \"    He stays busy chopping wood and yard work and work around [employer]. He notes he has been walking around in his yard as well. Pt notes he has some weakness in his left leg secondary to his prior CVA. 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 Last eye exam was March 2022 and he had cataract surgery.      Current Outpatient Medications   Medication Sig    Janumet -1,000 mg TM24 Take 1 tablet by mouth once daily    chlorthalidone (HYGROTON) 25 mg tablet Take 1 tablet by mouth once daily    lisinopriL (PRINIVIL, ZESTRIL) 40 mg tablet Take 1 tablet by mouth once daily    nebivoloL (BYSTOLIC) 20 mg tablet Take 1 tablet by mouth once daily    amLODIPine (NORVASC) 10 mg tablet Take 1 tablet by mouth once daily    atorvastatin (LIPITOR) 40 mg tablet Take 1 tablet by mouth once daily    insulin glargine (Lantus Solostar U-100 Insulin) 100 unit/mL (3 mL) inpn INJECT 10 UNITS SUBCUTANEOUSLY ONCE DAILY    Insulin Needles, Disposable, (Shadia Pen Needle) 32 gauge x 5/32\" ndle One shot daily    glipiZIDE SR (GLUCOTROL XL) 10 mg CR tablet Take 1 tablet by mouth once daily    meloxicam (MOBIC) 15 mg tablet 1/2 to 1 tablets by mouth once daily with food as needed for pain    celecoxib (CELEBREX) 200 mg capsule Take 200 mg by mouth daily.  psyllium husk 0.4 gram cap Take  by mouth daily.  clotrimazole-betamethasone (LOTRISONE) 1-0.05 % lotion Apply to the effected area every morning    OTHER hemeopathic leg cramps    famotidine (PEPCID) 20 mg tablet Take 20 mg by mouth daily.  cholecalciferol, vitamin D3, (VITAMIN D3) 2,000 unit tab Take  by mouth daily.  acetaminophen (TYLENOL) 325 mg tablet Take 1,000 mg by mouth every four (4) hours as needed for Pain.  aspirin 81 mg chewable tablet Take 81 mg by mouth daily.  pregabalin (LYRICA) 75 mg capsule Take 75 mg by mouth two (2) times a day. No current facility-administered medications for this visit. No Known Allergies  Review of Systems:  - Eyes: no blurry vision or double vision  - Cardiovascular: no chest pain  - Respiratory: no shortness of breath  - Musculoskeletal: no myalgias  - Neurological: no numbness/tingling in extremities    Physical Examination:  Blood pressure 128/81, pulse 75, height 5' 9\" (1.753 m), weight 179 lb (81.2 kg).   - General: pleasant, no distress, good eye contact   - Neck: no carotid bruits  - Cardiovascular: regular, normal rate, nl s1 and s2, no m/r/g, 2+ DP pulses   - Respiratory: clear bilaterally  - Integumentary: no edema, no foot ulcers  - Psychiatric: normal mood and affect    Diabetic foot exam:     Left Foot:   Visual Exam: callous - present   Pulse DP: 2+ (normal)   Filament test: normal sensation    Vibratory sensation: normal      Right Foot:   Visual Exam: callous - present   Pulse DP: 2+ (normal)   Filament test: normal sensation    Vibratory sensation: normal        Data Reviewed:   His A1C today was 7.2%. Assessment/Plan:   1) DM > His A1C today was 7.2%. Pt is not checking his BGs, he is also not having any issues of low BGs. His goal A1C is 8.0% or less. When we have tried more aggressive or higher dose of his insulin he has had hypoglycemia. I do not feel we need to make any changes at this time. For now pt to continue the Lantus 9 units daily, Glipizide ER 10mg daily and Janumet XR to 100/1000mg daliy. Pt to check his blood sugars twice per day. Pt to cut out the sodas and sweet tea. 2) HTN > His BP today was 128/81. He is on an ACEI for renal protection. Will not make any changes at this time. 3) HLD > His lipid panel was at goal in October 2021. Pt to continue the Atorvastatin 40mg daily. Pt voices understanding and agreement with the plan. Follow-up and Dispositions    · Return in about 6 months (around 12/3/2022). RTC 6 months     Copy sent to:  Jah Marsh and Javier

## 2022-06-03 NOTE — LETTER
6/3/2022    Patient: Wil Ramsey   YOB: 1955   Date of Visit: 6/3/2022     Barb Weber MD  [address]  [address]  [place]    Dear Barb Weber MD,      Thank you for referring Mr. Roya Mason to [address] [place] [address] for evaluation. My notes for this consultation are attached. If you have questions, please do not hesitate to call me. I look forward to following your patient along with you.       Sincerely,    Mari Tran MD

## 2022-06-10 ENCOUNTER — VIRTUAL VISIT (OUTPATIENT)
Dept: FAMILY MEDICINE CLINIC | Age: 67
End: 2022-06-10
Payer: MEDICARE

## 2022-06-10 DIAGNOSIS — I10 ESSENTIAL HYPERTENSION: Primary | ICD-10-CM

## 2022-06-10 DIAGNOSIS — Z72.0 TOBACCO ABUSE DISORDER: ICD-10-CM

## 2022-06-10 DIAGNOSIS — E11.9 TYPE 2 DIABETES MELLITUS WITHOUT COMPLICATION, WITHOUT LONG-TERM CURRENT USE OF INSULIN (HCC): ICD-10-CM

## 2022-06-10 DIAGNOSIS — Z79.899 ENCOUNTER FOR LONG-TERM (CURRENT) USE OF MEDICATIONS: ICD-10-CM

## 2022-06-10 DIAGNOSIS — E87.6 HYPOKALEMIA: ICD-10-CM

## 2022-06-10 DIAGNOSIS — E78.2 MIXED HYPERLIPIDEMIA: ICD-10-CM

## 2022-06-10 PROCEDURE — 99442 PR PHYS/QHP TELEPHONE EVALUATION 11-20 MIN: CPT | Performed by: FAMILY MEDICINE

## 2022-06-10 RX ORDER — CHLORHEXIDINE GLUCONATE 1.2 MG/ML
RINSE ORAL
COMMUNITY
Start: 2022-06-04

## 2022-06-10 RX ORDER — ATORVASTATIN CALCIUM 40 MG/1
TABLET, FILM COATED ORAL
Qty: 90 TABLET | Refills: 1 | Status: SHIPPED | OUTPATIENT
Start: 2022-06-10 | End: 2022-11-03 | Stop reason: SDUPTHER

## 2022-06-10 RX ORDER — CHLORTHALIDONE 25 MG/1
25 TABLET ORAL DAILY
Qty: 90 TABLET | Refills: 1 | Status: SHIPPED | OUTPATIENT
Start: 2022-06-10 | End: 2022-11-03 | Stop reason: SDUPTHER

## 2022-06-10 RX ORDER — AMOXICILLIN 500 MG/1
TABLET, FILM COATED ORAL
COMMUNITY
Start: 2022-06-04 | End: 2022-11-03 | Stop reason: ALTCHOICE

## 2022-06-10 RX ORDER — LISINOPRIL 20 MG/1
20 TABLET ORAL DAILY
Qty: 90 TABLET | Refills: 1 | Status: SHIPPED | OUTPATIENT
Start: 2022-06-10 | End: 2022-11-03 | Stop reason: SDUPTHER

## 2022-06-10 RX ORDER — POTASSIUM CHLORIDE 750 MG/1
10 CAPSULE, EXTENDED RELEASE ORAL DAILY
Qty: 90 CAPSULE | Refills: 1 | Status: SHIPPED | OUTPATIENT
Start: 2022-06-10 | End: 2022-11-03 | Stop reason: SDUPTHER

## 2022-06-10 RX ORDER — NEBIVOLOL 20 MG/1
20 TABLET ORAL DAILY
Qty: 90 TABLET | Refills: 1 | Status: SHIPPED | OUTPATIENT
Start: 2022-06-10 | End: 2022-11-03 | Stop reason: SDUPTHER

## 2022-06-10 RX ORDER — AMLODIPINE BESYLATE 10 MG/1
TABLET ORAL
Qty: 90 TABLET | Refills: 1 | Status: SHIPPED | OUTPATIENT
Start: 2022-06-10 | End: 2022-11-03 | Stop reason: SDUPTHER

## 2022-06-10 RX ORDER — POTASSIUM CHLORIDE 750 MG/1
CAPSULE, EXTENDED RELEASE ORAL
COMMUNITY
Start: 2022-02-28 | End: 2022-06-10 | Stop reason: SDUPTHER

## 2022-06-10 NOTE — PROGRESS NOTES
Nishi Oconnell is a 78 y.o. male evaluated via telephone on 6/10/2022. Consent:  He and/or health care decision maker is aware that he may receive a bill for this telephone service, depending on his insurance coverage, and has provided verbal consent to proceed: Yes      Documentation:  I communicated with the patient and/or health care decision maker about;   Details of this discussion including any medical advice provided:     [place]       Vaccinated for [employer] recently       has a past medical history of Blind right eye (6/5/2019), CAD (coronary artery disease), COPD, Coronary artery disease with history of myocardial infarction without history of CABG (6/5/2019), CVA (cerebral infarction) (3/24/2012), Diabetes ([facility] [address] Essential hypertension (6/5/2019), Gastroesophageal reflux disease without esophagitis (6/5/2019), MI (myocardial infarction) ([facility] [address] Mixed hyperlipidemia (6/5/2019), Stroke ([facility] [address] Thromboembolism ([facility] [address] (6/5/2019), and Thromboembolus ([facility] [address] HTN: BP at goal              Continue amlodipine, bystolic, chlorthalidone, Lisinopril, Potassium     HLD: Lipitor     COPD: breathing have been fine. He haven't need an inhaler for many months. Smoker 55 years, most of his life 2ppd.  attempt at counseling to quit. \"i'm gonna keep on smoking\".      He sees cardiologist Jose J.   CAD, MI, s/p CABG     Dr. Patrice Garcia endocrinologist.  DM2: A1C 7.2% 06/2022, 8.0% 10/2021, 11.4% 06/2020,  8.4% 12/2019, 11.1%              Lantus, Janumet, Glipizide SR     CVA: Effected left side.  Right eye blindness.        He still doesn't want Colonoscopy. Previous Dr. Alissa Sol  Discussed colorguard he also refuses     Discussed AAA screening due to smoking - he refuses     He refuses flu, pneumo, tdap and shingles.        Diagnoses and all orders for this visit:    1. Essential hypertension  -     lisinopriL (PRINIVIL, ZESTRIL) 20 mg tablet; Take 1 Tablet by mouth daily.   - amLODIPine (NORVASC) 10 mg tablet; Take 1 tablet by mouth once daily  -     nebivoloL (BYSTOLIC) 20 mg tablet; Take 1 Tablet by mouth daily. -     chlorthalidone (HYGROTON) 25 mg tablet; Take 1 Tablet by mouth daily.  -     CBC W/O DIFF; Future  -     METABOLIC PANEL, COMPREHENSIVE; Future  -     TSH 3RD GENERATION; Future    2. Type 2 diabetes mellitus without complication, without long-term current use of insulin (HCC)  -     CBC W/O DIFF; Future  -     METABOLIC PANEL, COMPREHENSIVE; Future  -     TSH 3RD GENERATION; Future    3. Mixed hyperlipidemia  -     atorvastatin (LIPITOR) 40 mg tablet; Take 1 tablet by mouth once daily  -     CBC W/O DIFF; Future  -     METABOLIC PANEL, COMPREHENSIVE; Future  -     TSH 3RD GENERATION; Future    4. Tobacco abuse disorder    5. Encounter for long-term (current) use of medications  -     CBC W/O DIFF; Future  -     METABOLIC PANEL, COMPREHENSIVE; Future  -     TSH 3RD GENERATION; Future    6. Hypokalemia  -     potassium chloride SA (MICRO-K) 10 mEq capsule; Take 1 Capsule by mouth daily.  -     METABOLIC PANEL, COMPREHENSIVE; Future      Follow-up and Dispositions    · Return in about 5 months (around 11/10/2022) for medicare wellness, HTN, HLD.   Follow-up and Disposition History         Past Medical History:   Diagnosis Date    Blind right eye 6/5/2019    CAD (coronary artery disease)     COPD     Coronary artery disease with history of myocardial infarction without history of CABG 6/5/2019    CVA (cerebral infarction) 3/24/2012    Diabetes ([facility] [address]     Essential hypertension 6/5/2019    Gastroesophageal reflux disease without esophagitis 6/5/2019    MI (myocardial infarction) ([address]     Mixed hyperlipidemia 6/5/2019    Stroke ([address]     Thromboembolism ([address] 6/5/2019    Thromboembolus ([facility] [address]        Current Outpatient Medications on File Prior to Visit   Medication Sig Dispense Refill    chlorhexidine (PERIDEX) 0.12 % solution RINSE FOR 30 SECONDS WITH 15ML OF SOLUTION THEN SPIT, 2-3 TIMES DAILY AS NEEDED      amoxicillin 500 mg tab TAKE 1 TABLET BY MOUTH THREE TIMES DAILY UNTIL GONE      Janumet -1,000 mg TM24 Take 1 tablet by mouth once daily 90 Tablet 3    insulin glargine (Lantus Solostar U-100 Insulin) 100 unit/mL (3 mL) inpn INJECT 10 UNITS SUBCUTANEOUSLY ONCE DAILY (Patient taking differently: INJECT 9 UNITS SUBCUTANEOUSLY ONCE DAILY) 15 mL 5    Insulin Needles, Disposable, (Shadia Pen Needle) 32 gauge x 5/32\" ndle One shot daily 100 Pen Needle 3    glipiZIDE SR (GLUCOTROL XL) 10 mg CR tablet Take 1 tablet by mouth once daily 90 Tablet 3    celecoxib (CELEBREX) 200 mg capsule Take 200 mg by mouth daily.  psyllium husk 0.4 gram cap Take  by mouth daily.  clotrimazole-betamethasone (LOTRISONE) 1-0.05 % lotion Apply to the effected area every morning 30 mL 1    OTHER hemeopathic leg cramps      famotidine (PEPCID) 20 mg tablet Take 20 mg by mouth daily.  cholecalciferol, vitamin D3, (VITAMIN D3) 2,000 unit tab Take  by mouth daily.  acetaminophen (TYLENOL) 325 mg tablet Take 1,000 mg by mouth every four (4) hours as needed for Pain.  aspirin 81 mg chewable tablet Take 81 mg by mouth daily.  meloxicam (MOBIC) 15 mg tablet 1/2 to 1 tablets by mouth once daily with food as needed for pain (Patient not taking: Reported on 6/10/2022)       No current facility-administered medications on file prior to visit. I affirm this is a Patient Initiated Episode with a Patient who has not had a related appointment within my department in the past 7 days or scheduled within the next 24 hours.     Total Time: minutes: 11-20 minutes    Note: not billable if this call serves to triage the patient into an appointment for the relevant concern      Davie Chisholm MD

## 2022-06-10 NOTE — PROGRESS NOTES
Chief Complaint   Patient presents with    Hypertension    Cholesterol Problem     Check meds    1. Have you been to the ER, urgent care clinic since your last visit? Hospitalized since your last visit? No     2. Have you seen or consulted any other health care providers outside of the [address] [place] [address] since your last visit? Include any pap smears or colon screening.  Yes

## 2022-09-01 LAB
ALBUMIN SERPL-MCNC: 4.4 G/DL (ref 3.8–4.8)
ALBUMIN/GLOB SERPL: 1.8 {RATIO} (ref 1.2–2.2)
ALP SERPL-CCNC: 92 IU/L (ref 44–121)
ALT SERPL-CCNC: 13 IU/L (ref 0–44)
AST SERPL-CCNC: 11 IU/L (ref 0–40)
BILIRUB SERPL-MCNC: 0.6 MG/DL (ref 0–1.2)
BUN SERPL-MCNC: 15 MG/DL (ref 8–27)
BUN/CREAT SERPL: 16 (ref 10–24)
CALCIUM SERPL-MCNC: 9.5 MG/DL (ref 8.6–10.2)
CHLORIDE SERPL-SCNC: 103 MMOL/L (ref 96–106)
CO2 SERPL-SCNC: 24 MMOL/L (ref 20–29)
CREAT SERPL-MCNC: 0.94 MG/DL (ref 0.76–1.27)
EGFR: 88 ML/MIN/1.73
ERYTHROCYTE [DISTWIDTH] IN BLOOD BY AUTOMATED COUNT: 13.7 % (ref 11.6–15.4)
GLOBULIN SER CALC-MCNC: 2.4 G/DL (ref 1.5–4.5)
GLUCOSE SERPL-MCNC: 164 MG/DL (ref 65–99)
HCT VFR BLD AUTO: 43.2 % (ref 37.5–51)
HGB BLD-MCNC: 14.8 G/DL (ref 13–17.7)
MCH RBC QN AUTO: 32.4 PG (ref 26.6–33)
MCHC RBC AUTO-ENTMCNC: 34.3 G/DL (ref 31.5–35.7)
MCV RBC AUTO: 95 FL (ref 79–97)
PLATELET # BLD AUTO: 209 X10E3/UL (ref 150–450)
POTASSIUM SERPL-SCNC: 4.6 MMOL/L (ref 3.5–5.2)
PROT SERPL-MCNC: 6.8 G/DL (ref 6–8.5)
RBC # BLD AUTO: 4.57 X10E6/UL (ref 4.14–5.8)
SODIUM SERPL-SCNC: 141 MMOL/L (ref 134–144)
TSH SERPL DL<=0.005 MIU/L-ACNC: 1.61 UIU/ML (ref 0.45–4.5)
WBC # BLD AUTO: 9.8 X10E3/UL (ref 3.4–10.8)

## 2022-10-30 ENCOUNTER — HOSPITAL ENCOUNTER (EMERGENCY)
Age: 67
Discharge: HOME | End: 2022-10-30
Attending: EMERGENCY MEDICINE
Payer: MEDICARE

## 2022-10-30 ENCOUNTER — APPOINTMENT (OUTPATIENT)
Dept: CT IMAGING | Age: 67
End: 2022-10-30
Attending: EMERGENCY MEDICINE
Payer: MEDICARE

## 2022-10-30 VITALS
TEMPERATURE: 97.8 F | SYSTOLIC BLOOD PRESSURE: 130 MMHG | BODY MASS INDEX: 26.51 KG/M2 | WEIGHT: 179 LBS | HEART RATE: 72 BPM | RESPIRATION RATE: 15 BRPM | OXYGEN SATURATION: 99 % | HEIGHT: 69 IN | DIASTOLIC BLOOD PRESSURE: 78 MMHG

## 2022-10-30 DIAGNOSIS — M51.26 LUMBAR HERNIATED DISC: Primary | ICD-10-CM

## 2022-10-30 DIAGNOSIS — M54.32 SCIATICA OF LEFT SIDE: ICD-10-CM

## 2022-10-30 PROCEDURE — 99284 EMERGENCY DEPT VISIT MOD MDM: CPT

## 2022-10-30 PROCEDURE — 72131 CT LUMBAR SPINE W/O DYE: CPT

## 2022-10-30 PROCEDURE — 74011250636 HC RX REV CODE- 250/636: Performed by: EMERGENCY MEDICINE

## 2022-10-30 PROCEDURE — 74011250637 HC RX REV CODE- 250/637: Performed by: EMERGENCY MEDICINE

## 2022-10-30 PROCEDURE — A9270 NON-COVERED ITEM OR SERVICE: HCPCS | Performed by: EMERGENCY MEDICINE

## 2022-10-30 PROCEDURE — 96372 THER/PROPH/DIAG INJ SC/IM: CPT

## 2022-10-30 PROCEDURE — 74011636637 HC RX REV CODE- 636/637: Performed by: EMERGENCY MEDICINE

## 2022-10-30 RX ORDER — OXYCODONE HYDROCHLORIDE 5 MG/1
5 TABLET ORAL
Status: COMPLETED | OUTPATIENT
Start: 2022-10-30 | End: 2022-10-30

## 2022-10-30 RX ORDER — PREDNISONE 10 MG/1
TABLET ORAL
Qty: 42 TABLET | Refills: 0 | Status: SHIPPED | OUTPATIENT
Start: 2022-10-30 | End: 2022-11-03

## 2022-10-30 RX ORDER — KETOROLAC TROMETHAMINE 30 MG/ML
30 INJECTION, SOLUTION INTRAMUSCULAR; INTRAVENOUS
Status: COMPLETED | OUTPATIENT
Start: 2022-10-30 | End: 2022-10-30

## 2022-10-30 RX ORDER — PREDNISONE 20 MG/1
60 TABLET ORAL
Status: COMPLETED | OUTPATIENT
Start: 2022-10-30 | End: 2022-10-30

## 2022-10-30 RX ORDER — ACETAMINOPHEN 500 MG
1000 TABLET ORAL ONCE
Status: COMPLETED | OUTPATIENT
Start: 2022-10-30 | End: 2022-10-30

## 2022-10-30 RX ORDER — OXYCODONE AND ACETAMINOPHEN 5; 325 MG/1; MG/1
1 TABLET ORAL
Qty: 12 TABLET | Refills: 0 | Status: SHIPPED | OUTPATIENT
Start: 2022-10-30 | End: 2022-11-01 | Stop reason: ALTCHOICE

## 2022-10-30 RX ADMIN — PREDNISONE 60 MG: 20 TABLET ORAL at 17:02

## 2022-10-30 RX ADMIN — ACETAMINOPHEN 1000 MG: 500 TABLET ORAL at 17:02

## 2022-10-30 RX ADMIN — OXYCODONE 5 MG: 5 TABLET ORAL at 17:02

## 2022-10-30 RX ADMIN — KETOROLAC TROMETHAMINE 30 MG: 30 INJECTION, SOLUTION INTRAMUSCULAR at 17:02

## 2022-10-30 NOTE — DISCHARGE INSTRUCTIONS
It was a pleasure taking care of you at [facility] Emergency Department today. We know that when you come to [facility], you are entrusting us with your health, comfort, and safety. Our physicians and nurses honor that trust, and we truly appreciate the opportunity to care for you and your loved ones. We also value your feedback. If you receive a survey about your Emergency Department experience today, please fill it out. We care about our patients' feedback, and we listen to what you have to say. Thank you!

## 2022-10-30 NOTE — ED NOTES
Pt having lt pain leg hip shooting down leg foot numb no pressure at all.  This is a chronic pain but the past few days getting worse todat is the worst

## 2022-10-30 NOTE — ED PROVIDER NOTES
EMERGENCY DEPARTMENT HISTORY AND PHYSICAL EXAM      Date: 10/30/2022  Patient Name: Algernon Call    History of Presenting Illness     Chief Complaint   Patient presents with    Leg Pain     Wheelchair into triage, cc of left leg pain radiating down back of leg from hip to foot. This pain started today but pt has hx of same, has seen ortho. Pt states he is unable to ambulate today       History Provided By: Patient    HPI: Algernon Call, 75 y.o. male with a past medical history significant for medical problems as stated below presents to the ED with cc of presenting with left leg pain rating down the back from the hip and foot. Pain is severe in nature, worse with movement of the leg and better at rest.  He has no significant back pain, loss of bladder or bowel function, known trauma, or fevers or chills. Patient has had this symptom for many years and has seen Ortho in the past for it. There are no associated symptoms. No other exacerbating or ameliorating factors. PCP: Macarena Macias MD    No current facility-administered medications on file prior to encounter. Current Outpatient Medications on File Prior to Encounter   Medication Sig Dispense Refill    chlorhexidine (PERIDEX) 0.12 % solution RINSE FOR 30 SECONDS WITH 15ML OF SOLUTION THEN SPIT, 2-3 TIMES DAILY AS NEEDED      amoxicillin 500 mg tab TAKE 1 TABLET BY MOUTH THREE TIMES DAILY UNTIL GONE      atorvastatin (LIPITOR) 40 mg tablet Take 1 tablet by mouth once daily 90 Tablet 1    potassium chloride SA (MICRO-K) 10 mEq capsule Take 1 Capsule by mouth daily. 90 Capsule 1    lisinopriL (PRINIVIL, ZESTRIL) 20 mg tablet Take 1 Tablet by mouth daily. 90 Tablet 1    amLODIPine (NORVASC) 10 mg tablet Take 1 tablet by mouth once daily 90 Tablet 1    nebivoloL (BYSTOLIC) 20 mg tablet Take 1 Tablet by mouth daily. 90 Tablet 1    chlorthalidone (HYGROTON) 25 mg tablet Take 1 Tablet by mouth daily.  90 Tablet 1    Janumet -1,000 mg TM24 Take 1 tablet by mouth once daily 90 Tablet 3    insulin glargine (Lantus Solostar U-100 Insulin) 100 unit/mL (3 mL) inpn INJECT 10 UNITS SUBCUTANEOUSLY ONCE DAILY (Patient taking differently: INJECT 9 UNITS SUBCUTANEOUSLY ONCE DAILY) 15 mL 5    Insulin Needles, Disposable, (Shadia Pen Needle) 32 gauge x 5/32\" ndle One shot daily 100 Pen Needle 3    glipiZIDE SR (GLUCOTROL XL) 10 mg CR tablet Take 1 tablet by mouth once daily 90 Tablet 3    meloxicam (MOBIC) 15 mg tablet 1/2 to 1 tablets by mouth once daily with food as needed for pain (Patient not taking: Reported on 6/10/2022)      celecoxib (CELEBREX) 200 mg capsule Take 200 mg by mouth daily. psyllium husk 0.4 gram cap Take  by mouth daily. clotrimazole-betamethasone (LOTRISONE) 1-0.05 % lotion Apply to the effected area every morning 30 mL 1    OTHER hemeopathic leg cramps      famotidine (PEPCID) 20 mg tablet Take 20 mg by mouth daily. cholecalciferol, vitamin D3, (VITAMIN D3) 2,000 unit tab Take  by mouth daily. acetaminophen (TYLENOL) 325 mg tablet Take 1,000 mg by mouth every four (4) hours as needed for Pain. aspirin 81 mg chewable tablet Take 81 mg by mouth daily.            Past History     Past Medical History:  Past Medical History:   Diagnosis Date    Blind right eye 6/5/2019    CAD (coronary artery disease)     COPD     Coronary artery disease with history of myocardial infarction without history of CABG 6/5/2019    CVA (cerebral infarction) 3/24/2012    Diabetes ([address]     Essential hypertension 6/5/2019    Gastroesophageal reflux disease without esophagitis 6/5/2019    MI (myocardial infarction) ([address]     Mixed hyperlipidemia 6/5/2019    Stroke ([address]     Thromboembolism ([address] 6/5/2019    Thromboembolus ([address]        Past Surgical History:  Past Surgical History:   Procedure Laterality Date    COMB R&L HEART CATH  3/24/2012         HX COLONOSCOPY      HX HERNIA REPAIR      HX OTHER SURGICAL      cyst removed from right chest    HX UROLOGICAL      kidney stone removed    UT CARDIAC SURG PROCEDURE UNLIST      cabg, stents    UT CARDIAC SURG PROCEDURE UNLIST         Family History:  Family History   Problem Relation Age of Onset    Heart Disease Mother     Coronary Art Dis Father     Coronary Art Dis Other         Mother and brother in their 52's       Social History:  Social History     Tobacco Use    Smoking status: Every Day     Packs/day: 1.50     Years: 55.00     Pack years: 82.50     Types: Cigarettes    Smokeless tobacco: Never   Vaping Use    Vaping Use: Never used   Substance Use Topics    Alcohol use: Not Currently    Drug use: Never       Allergies:  No Known Allergies      Review of Systems   Review of Systems   Constitutional:  Negative for chills, diaphoresis, fatigue and fever. HENT:  Negative for ear pain and sore throat. Eyes:  Negative for pain and redness. Respiratory:  Negative for cough and shortness of breath. Cardiovascular:  Negative for chest pain and leg swelling. Gastrointestinal:  Negative for abdominal pain, diarrhea, nausea and vomiting. Endocrine: Negative for cold intolerance and heat intolerance. Genitourinary:  Negative for flank pain and hematuria. Musculoskeletal:  Negative for back pain and neck stiffness. Skin:  Negative for rash and wound. Neurological:  Negative for dizziness, syncope and headaches. All other systems reviewed and are negative. Physical Exam   Physical Exam  Vitals and nursing note reviewed. Constitutional:       General: He is in acute distress. Appearance: He is well-developed. He is not ill-appearing. HENT:      Head: Normocephalic and atraumatic. Mouth/Throat:      Pharynx: No oropharyngeal exudate. Eyes:      Conjunctiva/sclera: Conjunctivae normal.      Pupils: Pupils are equal, round, and reactive to light. Cardiovascular:      Rate and Rhythm: Normal rate and regular rhythm. Heart sounds: No murmur heard.   Pulmonary:      Effort: Pulmonary effort is normal. No respiratory distress. Breath sounds: Normal breath sounds. No wheezing. Abdominal:      General: Bowel sounds are normal. There is no distension. Palpations: Abdomen is soft. Tenderness: There is no abdominal tenderness. Musculoskeletal:         General: No deformity. Normal range of motion. Cervical back: Normal range of motion. Skin:     General: Skin is warm and dry. Findings: No rash. Neurological:      Mental Status: He is alert and oriented to person, place, and time. Cranial Nerves: No cranial nerve deficit. Sensory: No sensory deficit. Motor: No weakness. Coordination: Coordination normal.      Gait: Gait normal.   Psychiatric:         Behavior: Behavior normal.       Diagnostic Study Results     Labs -   No results found for this or any previous visit (from the past 24 hour(s)). Radiologic Studies -   CT SPINE LUMB WO CONT   Final Result   1. Bilateral L5 pars defects with multilevel degenerative changes detailed above   2. Bilateral renal lesions. 18 mm lesion lower pole left kidney is high density. It may represent a high density cyst. But is indeterminate           CT Results  (Last 48 hours)                 10/30/22 1716  CT SPINE LUMB WO CONT Final result    Impression:  1. Bilateral L5 pars defects with multilevel degenerative changes detailed above   2. Bilateral renal lesions. 18 mm lesion lower pole left kidney is high density. It may represent a high density cyst. But is indeterminate           Narrative:  INDICATION:  severe lower back pain, radiates down left leg        EXAM: CT bar spine. No comparisons. Thin section axial images were obtained. From these sagittal and coronal   reformats were performed. CT dose reduction was achieved through use of a   standardized protocol tailored for this examination and automatic exposure   control for dose modulation.         FINDINGS: There is slight retrolisthesis of L4 on L5 anterolisthesis of L5 on S1   due to chronic bilateral L5 pars defects. Bones are osteopenic without acute   fracture       There are vascular calcifications. Hyperplasia of the adrenal glands. 1.8 cm   high density lesion lower pole left kidney likely a high density cyst. Probable   renal cysts. Small bulge at L2-3 minimally indenting the ventral thecal sac       L3-4 has a diffuse bulge mildly narrowing the canal and foramen       L4-5: Diffuse bulge and facet arthropathy. Mild narrowing of the canal. Moderate   narrowing of the foramen       L5-S1. No significant canal stenosis. Disc bulge and hypertrophy of the soft   tissues at the pars defect results in moderate severe narrowing of the foramen                 CXR Results  (Last 48 hours)      None              Medical Decision Making   I am the first provider for this patient. I reviewed the vital signs, available nursing notes, past medical history, past surgical history, family history and social history. Vital Signs-Reviewed the patient's vital signs. Patient Vitals for the past 12 hrs:   Temp Pulse Resp BP SpO2   10/30/22 1600 -- 72 15 130/78 --   10/30/22 1357 97.8 °F (36.6 °C) 68 16 134/72 99 %       Records Reviewed: Nursing records and medical records reviewed    MDM:  Sciatica versus lumbar radiculopathy versus spinal cord compression    Provider Notes (Medical Decision Making):   Patient with clear signs of herniated disc likely causing a radicular symptoms down the left lower extremity. Patient's pain is well controlled symptoms and medication given below. The patient has clear follow-up, return precautions given. ED Course:   Initial assessment performed. The patients presenting problems have been discussed, and they are in agreement with the care plan formulated and outlined with them. I have encouraged them to ask questions as they arise throughout their visit.          Medications Administered acetaminophen (TYLENOL) tablet 1,000 mg       Admin Date  10/30/2022 Action  Given Dose  1,000 mg Route  Oral Administered By  Urbano Crowe RN              ketorolac (TORADOL) injection 30 mg       Admin Date  10/30/2022 Action  Given Dose  30 mg Route  IntraMUSCular Administered By  Urbano Crowe RN              oxyCODONE IR (ROXICODONE) tablet 5 mg       Admin Date  10/30/2022 Action  Given Dose  5 mg Route  Oral Administered By  Urbano Crowe RN              predniSONE (DELTASONE) tablet 60 mg       Admin Date  10/30/2022 Action  Given Dose  60 mg Route  Oral Administered By  Urbano Crowe RN                        Critical Care:  None      Disposition:  8:47 PM  Dong Pritchett  results have been reviewed with him. He has been counseled regarding his diagnosis. He verbally conveys understanding and agreement of the signs, symptoms, diagnosis, treatment and prognosis and additionally agrees to follow up as recommended with Dr. Gerlean Fothergill, MD in 24 - 48 hours. He also agrees with the care-plan and conveys that all of his questions have been answered. I have also put together some discharge instructions for him that include: 1) educational information regarding their diagnosis, 2) how to care for their diagnosis at home, as well a 3) list of reasons why they would want to return to the ED prior to their follow-up appointment, should their condition change. DISCHARGE PLAN:  1. Current Discharge Medication List        START taking these medications    Details   predniSONE (STERAPRED DS) 10 mg dose pack Take 5tab(50mg)x3days, 4tab(40mg)x3days, 3tab(30mg)x3days, 2tab(20mg)x2days, 1tab(10mg)x2days. #42  Qty: 42 Tablet, Refills: 0  Start date: 10/30/2022      oxyCODONE-acetaminophen (Percocet) 5-325 mg per tablet Take 1 Tablet by mouth every six (6) hours as needed for Pain for up to 3 days. Max Daily Amount: 4 Tablets.   Qty: 12 Tablet, Refills: 0  Start date: 10/30/2022, End date: 11/2/2022 Associated Diagnoses: Lumbar herniated disc; Sciatica of left side           2. Follow-up Information       Follow up With Specialties Details Why Contact Info    Ritesh Mccauley MD Orthopaedic Surgery of the Spine Physician, Orthopedic Surgery In 1 week For a follow-up evaluation. This is a spine specialist you see for further evaluation of your symptoms. [address] [facility],  [phone]  [phone]      \[facility]\"" EMERGENCY DEPT Emergency Medicine In 2 days If symptoms worsen [address]  [address] [facility]  [phone]          3. Return to ED if worse     Diagnosis     Clinical Impression:   1. Lumbar herniated disc    2. Sciatica of left side        Attestations:    Sylvester Dong MD    Please note that this dictation was completed with [employer], the computer voice recognition software. Quite often unanticipated grammatical, syntax, homophones, and other interpretive errors are inadvertently transcribed by the computer software. Please disregard these errors. Please excuse any errors that have escaped final proofreading. Thank you.

## 2022-11-01 RX ORDER — OXYCODONE HYDROCHLORIDE 5 MG/1
5 TABLET ORAL
Qty: 15 TABLET | Refills: 0 | Status: SHIPPED | OUTPATIENT
Start: 2022-11-01 | End: 2022-11-04

## 2022-11-03 ENCOUNTER — OFFICE VISIT (OUTPATIENT)
Dept: FAMILY MEDICINE CLINIC | Age: 67
End: 2022-11-03
Payer: MEDICARE

## 2022-11-03 ENCOUNTER — OFFICE VISIT (OUTPATIENT)
Dept: ENDOCRINOLOGY | Age: 67
End: 2022-11-03
Payer: MEDICARE

## 2022-11-03 VITALS
BODY MASS INDEX: 27.43 KG/M2 | HEART RATE: 63 BPM | SYSTOLIC BLOOD PRESSURE: 113 MMHG | HEIGHT: 69 IN | DIASTOLIC BLOOD PRESSURE: 66 MMHG | WEIGHT: 185.2 LBS

## 2022-11-03 VITALS
TEMPERATURE: 98.9 F | HEART RATE: 68 BPM | OXYGEN SATURATION: 98 % | BODY MASS INDEX: 27.4 KG/M2 | HEIGHT: 69 IN | SYSTOLIC BLOOD PRESSURE: 114 MMHG | WEIGHT: 185 LBS | RESPIRATION RATE: 18 BRPM | DIASTOLIC BLOOD PRESSURE: 70 MMHG

## 2022-11-03 DIAGNOSIS — M54.32 SCIATICA OF LEFT SIDE: ICD-10-CM

## 2022-11-03 DIAGNOSIS — E78.2 MIXED HYPERLIPIDEMIA: ICD-10-CM

## 2022-11-03 DIAGNOSIS — N28.89 RENAL MASS: ICD-10-CM

## 2022-11-03 DIAGNOSIS — Z79.4 TYPE 2 DIABETES MELLITUS WITH DIABETIC NEPHROPATHY, WITH LONG-TERM CURRENT USE OF INSULIN (HCC): Primary | ICD-10-CM

## 2022-11-03 DIAGNOSIS — E87.6 HYPOKALEMIA: ICD-10-CM

## 2022-11-03 DIAGNOSIS — I10 ESSENTIAL HYPERTENSION: Primary | ICD-10-CM

## 2022-11-03 DIAGNOSIS — E11.21 TYPE 2 DIABETES MELLITUS WITH DIABETIC NEPHROPATHY, WITH LONG-TERM CURRENT USE OF INSULIN (HCC): Primary | ICD-10-CM

## 2022-11-03 DIAGNOSIS — I10 ESSENTIAL HYPERTENSION: ICD-10-CM

## 2022-11-03 LAB — HBA1C MFR BLD HPLC: 8.5 %

## 2022-11-03 PROCEDURE — G8432 DEP SCR NOT DOC, RNG: HCPCS | Performed by: FAMILY MEDICINE

## 2022-11-03 PROCEDURE — G8417 CALC BMI ABV UP PARAM F/U: HCPCS | Performed by: INTERNAL MEDICINE

## 2022-11-03 PROCEDURE — G8536 NO DOC ELDER MAL SCRN: HCPCS | Performed by: INTERNAL MEDICINE

## 2022-11-03 PROCEDURE — G8427 DOCREV CUR MEDS BY ELIG CLIN: HCPCS | Performed by: INTERNAL MEDICINE

## 2022-11-03 PROCEDURE — 99214 OFFICE O/P EST MOD 30 MIN: CPT | Performed by: INTERNAL MEDICINE

## 2022-11-03 PROCEDURE — G8754 DIAS BP LESS 90: HCPCS | Performed by: FAMILY MEDICINE

## 2022-11-03 PROCEDURE — G8754 DIAS BP LESS 90: HCPCS | Performed by: INTERNAL MEDICINE

## 2022-11-03 PROCEDURE — 83036 HEMOGLOBIN GLYCOSYLATED A1C: CPT | Performed by: INTERNAL MEDICINE

## 2022-11-03 PROCEDURE — 2022F DILAT RTA XM EVC RTNOPTHY: CPT | Performed by: INTERNAL MEDICINE

## 2022-11-03 PROCEDURE — 3078F DIAST BP <80 MM HG: CPT | Performed by: INTERNAL MEDICINE

## 2022-11-03 PROCEDURE — G0463 HOSPITAL OUTPT CLINIC VISIT: HCPCS | Performed by: FAMILY MEDICINE

## 2022-11-03 PROCEDURE — G8432 DEP SCR NOT DOC, RNG: HCPCS | Performed by: INTERNAL MEDICINE

## 2022-11-03 PROCEDURE — G8752 SYS BP LESS 140: HCPCS | Performed by: FAMILY MEDICINE

## 2022-11-03 PROCEDURE — 1101F PT FALLS ASSESS-DOCD LE1/YR: CPT | Performed by: FAMILY MEDICINE

## 2022-11-03 PROCEDURE — G8417 CALC BMI ABV UP PARAM F/U: HCPCS | Performed by: FAMILY MEDICINE

## 2022-11-03 PROCEDURE — 3017F COLORECTAL CA SCREEN DOC REV: CPT | Performed by: INTERNAL MEDICINE

## 2022-11-03 PROCEDURE — 3017F COLORECTAL CA SCREEN DOC REV: CPT | Performed by: FAMILY MEDICINE

## 2022-11-03 PROCEDURE — 1101F PT FALLS ASSESS-DOCD LE1/YR: CPT | Performed by: INTERNAL MEDICINE

## 2022-11-03 PROCEDURE — G8536 NO DOC ELDER MAL SCRN: HCPCS | Performed by: FAMILY MEDICINE

## 2022-11-03 PROCEDURE — 3074F SYST BP LT 130 MM HG: CPT | Performed by: INTERNAL MEDICINE

## 2022-11-03 PROCEDURE — 1123F ACP DISCUSS/DSCN MKR DOCD: CPT | Performed by: FAMILY MEDICINE

## 2022-11-03 PROCEDURE — 99214 OFFICE O/P EST MOD 30 MIN: CPT | Performed by: FAMILY MEDICINE

## 2022-11-03 PROCEDURE — 3074F SYST BP LT 130 MM HG: CPT | Performed by: FAMILY MEDICINE

## 2022-11-03 PROCEDURE — 3052F HG A1C>EQUAL 8.0%<EQUAL 9.0%: CPT | Performed by: INTERNAL MEDICINE

## 2022-11-03 PROCEDURE — G8752 SYS BP LESS 140: HCPCS | Performed by: INTERNAL MEDICINE

## 2022-11-03 PROCEDURE — 3078F DIAST BP <80 MM HG: CPT | Performed by: FAMILY MEDICINE

## 2022-11-03 PROCEDURE — 1123F ACP DISCUSS/DSCN MKR DOCD: CPT | Performed by: INTERNAL MEDICINE

## 2022-11-03 PROCEDURE — G8427 DOCREV CUR MEDS BY ELIG CLIN: HCPCS | Performed by: FAMILY MEDICINE

## 2022-11-03 RX ORDER — PREDNISONE 10 MG/1
TABLET ORAL
COMMUNITY
Start: 2022-10-31

## 2022-11-03 RX ORDER — GABAPENTIN 300 MG/1
300 CAPSULE ORAL
Qty: 30 CAPSULE | Refills: 0 | Status: SHIPPED | OUTPATIENT
Start: 2022-11-03

## 2022-11-03 RX ORDER — INSULIN GLARGINE 100 [IU]/ML
INJECTION, SOLUTION SUBCUTANEOUS
Qty: 15 ML | Refills: 5 | Status: SHIPPED | OUTPATIENT
Start: 2022-11-03

## 2022-11-03 RX ORDER — CHLORTHALIDONE 25 MG/1
25 TABLET ORAL DAILY
Qty: 90 TABLET | Refills: 1 | Status: SHIPPED | OUTPATIENT
Start: 2022-11-03

## 2022-11-03 RX ORDER — LISINOPRIL 20 MG/1
20 TABLET ORAL DAILY
Qty: 90 TABLET | Refills: 1 | Status: SHIPPED | OUTPATIENT
Start: 2022-11-03

## 2022-11-03 RX ORDER — NEBIVOLOL 20 MG/1
20 TABLET ORAL DAILY
Qty: 90 TABLET | Refills: 1 | Status: SHIPPED | OUTPATIENT
Start: 2022-11-03

## 2022-11-03 RX ORDER — AMLODIPINE BESYLATE 10 MG/1
TABLET ORAL
Qty: 90 TABLET | Refills: 1 | Status: SHIPPED | OUTPATIENT
Start: 2022-11-03

## 2022-11-03 RX ORDER — ATORVASTATIN CALCIUM 40 MG/1
TABLET, FILM COATED ORAL
Qty: 90 TABLET | Refills: 1 | Status: SHIPPED | OUTPATIENT
Start: 2022-11-03

## 2022-11-03 RX ORDER — POTASSIUM CHLORIDE 750 MG/1
10 CAPSULE, EXTENDED RELEASE ORAL DAILY
Qty: 90 CAPSULE | Refills: 1 | Status: SHIPPED | OUTPATIENT
Start: 2022-11-03

## 2022-11-03 NOTE — PROGRESS NOTES
Anshu John is a 75 y.o. male    With [place] today. [place] has a past medical history of Blind right eye (6/5/2019), CAD (coronary artery disease), COPD, Coronary artery disease with history of myocardial infarction without history of CABG (6/5/2019), CVA (cerebral infarction) (3/24/2012), Diabetes [address] Essential hypertension (6/5/2019), Gastroesophageal reflux disease without esophagitis (6/5/2019), MI (myocardial infarction) ([facility] Mixed hyperlipidemia (6/5/2019), Stroke ([facility] Thromboembolism ([facility] (6/5/2019), and Thromboembolus ([facility] Acute low back pain  Went to ED 3 days ago had CT spine  Was given prednisone and oxycodone. Refer to Spine     CT showed renal cyst  Refer kidney doctor. HTN: BP at goal              Continue amlodipine, bystolic, chlorthalidone, Lisinopril, Potassium     HLD: Lipitor     COPD: breathing have been fine. He haven't need an inhaler for many months. Smoker 55 years, most of his life 2ppd. attempt at counseling to quit. \"i'm gonna keep on smoking\". He sees cardiologist Marcelina Dent. CAD, MI, s/p CABG     Dr. Sarahy Barcenas endocrinologist.  DM2: A1C 7.2% 06/2022, 8.0% 10/2021, 11.4% 06/2020,  8.4% 12/2019, 11.1%              Lantus, Janumet, Glipizide SR     CVA: Effected left side. Right eye blindness. He still doesn't want Colonoscopy. Previous Dr. Sherre Fothergill  Discussed colorguard he also refuses     Discussed AAA screening due to smoking - he refuses again  Refuses low dose CT scan     He refuses flu, pneumo, tdap and shingles. Reviewed: active problem list, medication list, allergies, notes from last encounter, lab results    A comprehensive review of systems was negative except for that written in the HPI.       No Known Allergies  Current Outpatient Medications on File Prior to Visit   Medication Sig Dispense Refill    predniSONE (DELTASONE) 10 mg tablet TAKE 5 TABLETS BY MOUTH ONCE DAILY FOR 3 DAYS THEN 4 ONCE DAILY FOR 3 DAYS THEN 3 ONCE DAILY FOR 3 DAYS THEN 2 ONCE DAILY FOR 2 DAYS THEN 1 ONCE DAILY FOR 2 DAYS      insulin glargine (Lantus Solostar U-100 Insulin) 100 unit/mL (3 mL) inpn INJECT 9 UNITS SUBCUTANEOUSLY ONCE DAILY 15 mL 5    oxyCODONE IR (Roxicodone) 5 mg immediate release tablet Take 1 Tablet by mouth every six (6) hours as needed for Pain for up to 3 days. Max Daily Amount: 20 mg. 15 Tablet 0    chlorhexidine (PERIDEX) 0.12 % solution RINSE FOR 30 SECONDS WITH 15ML OF SOLUTION THEN SPIT, 2-3 TIMES DAILY AS NEEDED      Janumet -1,000 mg TM24 Take 1 tablet by mouth once daily 90 Tablet 3    Insulin Needles, Disposable, (Shadia Pen Needle) 32 gauge x 5/32\" ndle One shot daily 100 Pen Needle 3    glipiZIDE SR (GLUCOTROL XL) 10 mg CR tablet Take 1 tablet by mouth once daily 90 Tablet 3    celecoxib (CELEBREX) 200 mg capsule Take 200 mg by mouth daily. psyllium husk 0.4 gram cap Take  by mouth daily. clotrimazole-betamethasone (LOTRISONE) 1-0.05 % lotion Apply to the effected area every morning 30 mL 1    OTHER hemeopathic leg cramps      famotidine (PEPCID) 20 mg tablet Take 20 mg by mouth daily. cholecalciferol, vitamin D3, 50 mcg (2,000 unit) tab Take  by mouth daily. acetaminophen (TYLENOL) 325 mg tablet Take 1,000 mg by mouth every four (4) hours as needed for Pain. aspirin 81 mg chewable tablet Take 81 mg by mouth daily. No current facility-administered medications on file prior to visit.      Patient Active Problem List   Diagnosis Code    Gastroesophageal reflux disease without esophagitis K21.9    Blind right eye H54.40    Encounter for long-term (current) use of medications Z79.899    CAD (coronary artery disease) I25.10    S/P PTCA (percutaneous transluminal coronary angioplasty) Z98.61    Tobacco abuse disorder Z72.0    Essential hypertension I10    Chronic systolic heart failure (HCC) I50.22    History of noncompliance with medical treatment Z91.199 S/P CABG x 4 Z95.1    Acute, but ill-defined, cerebrovascular disease I67.89    S/P coronary artery stent placement Z95.5    Mixed hyperlipidemia E78.2    Type 2 diabetes mellitus with complication, with long-term current use of insulin (HCC) E11.8, Z79.4    Type 2 diabetes with nephropathy (HCC) E11.21    History of stroke Z86.73       Visit Vitals  /70   Pulse 68   Temp 98.9 °F (37.2 °C) (Temporal)   Resp 18   Ht 5' 9\" (1.753 m)   Wt 185 lb (83.9 kg)   SpO2 98%   BMI 27.32 kg/m²     General appearance: alert, cooperative, no distress, appears stated age  Neurologic: Alert and oriented X 3, normal strength and tone, symmetric. Normal without focal findings. Cranial nerves 2-12 intact. Normal coordination and gait. Mental status: Alert, oriented, thought content appropriate, affect: stable, mood-congruent. Head: Normocephalic, without obvious abnormality, atraumatic  Eyes: conjunctivae/corneas clear. PERRL, EOM's intact. Neck: supple, symmetrical, trachea midline, no JVD  Lungs: clear to auscultation bilaterally  Heart: regular rate and rhythm, S1, S2 normal, no murmur, click, rub or gallop  Abdomen: soft, non-tender. Extremities: extremities normal, atraumatic, no cyanosis or edema      Assessment/Plans:    Diagnoses and all orders for this visit:    1. Essential hypertension  -     lisinopriL (PRINIVIL, ZESTRIL) 20 mg tablet; Take 1 Tablet by mouth daily. -     amLODIPine (NORVASC) 10 mg tablet; Take 1 tablet by mouth once daily  -     nebivoloL (BYSTOLIC) 20 mg tablet; Take 1 Tablet by mouth daily. -     chlorthalidone (HYGROTON) 25 mg tablet; Take 1 Tablet by mouth daily. 2. Mixed hyperlipidemia  -     atorvastatin (LIPITOR) 40 mg tablet; Take 1 tablet by mouth once daily    3. Hypokalemia  -     potassium chloride SA (MICRO-K) 10 mEq capsule; Take 1 Capsule by mouth daily.     4. Renal mass  -     REFERRAL TO NEPHROLOGY    5. Sciatica of left side  -     REFERRAL TO SPINE SURGERY  - gabapentin (NEURONTIN) 300 mg capsule; Take 1 Capsule by mouth daily as needed for Pain. Discussed plans, risk/benefits of treatments/observations. Through the use of shared decision making, above plans were agreed upon. Medication compliance advised. Patient verbalized understanding. Follow-up and Dispositions    Return in about 6 months (around 5/3/2023) for HTN, HLD.          Karen Andrea MD  11/3/2022

## 2022-11-03 NOTE — PROGRESS NOTES
Chief Complaint   Patient presents with    Diabetes     Pcp and pharmacy verified   Records since his last visit reviewed. History of Present Illness: Ian Barba is a 75 y.o. male here for follow up of diabetes. \"I have been having pain and weakness in my legs and they said it was because of a bulging disc. \"I had to go to the ED because I could not get out of bed. \"  He was given pain medication and steroids and was told to follow up with [facility]. His A1C was up to 8.5%    Pt is not checking his BGs, but he has not been having issues of hypoglycemia. Pt notes he is taking Lantus 9 units daily, he is still taking the Glipizde ER 10mg in the AM only and Janumet /1000mg in the AM only. He denies issues of CP, SOB, palpitations, polyuria, he notes he has nocturia. \"I had a tooth broke a couple weeks ago and I had to go get it pulled out. I had the partial palate made, but I have not been using it, it did not fit right. I have to go back next week and they may need to pull some more teeth. \"    Pt is eating 3 meals daily. His largest meal is Dinner. He has breakfast between 8-9AM, today he had a pop-tart and diet soda. He will have lunch around 1PM, yesterday he had two rolls with beef and water. He has supper around 6-7PM, last night he had fried rice and grape soda and a doughnut. He is followed by Dr. Porfirio Mike of cardiology. He saw Dr. Hernesto Small in October 2022. \"He said every thing was looking good and he said to come in every year now. \"    He stays busy chopping wood and yard work and work around [employer]. He notes he has been walking around in his yard as well. Pt notes he has some weakness in his left leg secondary to his prior CVA. 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 Last eye exam was March 2022 and he had cataract surgery. \"The surgery actually helped my blind eye some as well.  I can actually see my fingers. Current Outpatient Medications   Medication Sig    predniSONE (DELTASONE) 10 mg tablet TAKE 5 TABLETS BY MOUTH ONCE DAILY FOR 3 DAYS THEN 4 ONCE DAILY FOR 3 DAYS THEN 3 ONCE DAILY FOR 3 DAYS THEN 2 ONCE DAILY FOR 2 DAYS THEN 1 ONCE DAILY FOR 2 DAYS    insulin glargine (Lantus Solostar U-100 Insulin) 100 unit/mL (3 mL) inpn INJECT 9 UNITS SUBCUTANEOUSLY ONCE DAILY    oxyCODONE IR (Roxicodone) 5 mg immediate release tablet Take 1 Tablet by mouth every six (6) hours as needed for Pain for up to 3 days. Max Daily Amount: 20 mg.    chlorhexidine (PERIDEX) 0.12 % solution RINSE FOR 30 SECONDS WITH 15ML OF SOLUTION THEN SPIT, 2-3 TIMES DAILY AS NEEDED    atorvastatin (LIPITOR) 40 mg tablet Take 1 tablet by mouth once daily    potassium chloride SA (MICRO-K) 10 mEq capsule Take 1 Capsule by mouth daily. lisinopriL (PRINIVIL, ZESTRIL) 20 mg tablet Take 1 Tablet by mouth daily. amLODIPine (NORVASC) 10 mg tablet Take 1 tablet by mouth once daily    nebivoloL (BYSTOLIC) 20 mg tablet Take 1 Tablet by mouth daily. chlorthalidone (HYGROTON) 25 mg tablet Take 1 Tablet by mouth daily. Janumet -1,000 mg TM24 Take 1 tablet by mouth once daily    Insulin Needles, Disposable, (Shadia Pen Needle) 32 gauge x 5/32\" ndle One shot daily    glipiZIDE SR (GLUCOTROL XL) 10 mg CR tablet Take 1 tablet by mouth once daily    celecoxib (CELEBREX) 200 mg capsule Take 200 mg by mouth daily. psyllium husk 0.4 gram cap Take  by mouth daily. clotrimazole-betamethasone (LOTRISONE) 1-0.05 % lotion Apply to the effected area every morning    OTHER hemeopathic leg cramps    famotidine (PEPCID) 20 mg tablet Take 20 mg by mouth daily. cholecalciferol, vitamin D3, 50 mcg (2,000 unit) tab Take  by mouth daily. acetaminophen (TYLENOL) 325 mg tablet Take 1,000 mg by mouth every four (4) hours as needed for Pain. aspirin 81 mg chewable tablet Take 81 mg by mouth daily.        No current facility-administered medications for this visit. No Known Allergies  Review of Systems:  - Eyes: no blurry vision or double vision  - Cardiovascular: no chest pain  - Respiratory: no shortness of breath  - Musculoskeletal: no myalgias  - Neurological: no numbness/tingling in extremities    Physical Examination:  Blood pressure 113/66, pulse 63, height 5' 9\" (1.753 m), weight 185 lb 3.2 oz (84 kg). General: pleasant, no distress, good eye contact   Neck: no carotid bruits  Cardiovascular: regular, normal rate, nl s1 and s2, no m/r/g, 2+ DP pulses   Respiratory: clear bilaterally  Integumentary: no edema, no foot ulcers  Psychiatric: normal mood and affect    Diabetic foot exam:     Left Foot:   Visual Exam: callous - present   Pulse DP: 2+ (normal)   Filament test: normal sensation    Vibratory sensation: normal      Right Foot:   Visual Exam: callous - present   Pulse DP: 2+ (normal)   Filament test: normal sensation    Vibratory sensation: normal        Data Reviewed:   His A1C today was 8.5%. Component      Latest Ref Rng & Units 8/31/2022   Glucose      65 - 99 mg/dL 164 (H)   BUN      8 - 27 mg/dL 15   Creatinine      0.76 - 1.27 mg/dL 0.94   eGFR      >59 mL/min/1.73 88   BUN/Creatinine ratio      10 - 24 16   Sodium      134 - 144 mmol/L 141   Potassium      3.5 - 5.2 mmol/L 4.6   Chloride      96 - 106 mmol/L 103   CO2      20 - 29 mmol/L 24   Calcium      8.6 - 10.2 mg/dL 9.5   Protein, total      6.0 - 8.5 g/dL 6.8   Albumin      3.8 - 4.8 g/dL 4.4   GLOBULIN, TOTAL      1.5 - 4.5 g/dL 2.4   A-G Ratio      1.2 - 2.2 1.8   Bilirubin, total      0.0 - 1.2 mg/dL 0.6   Alk.  phosphatase      44 - 121 IU/L 92   AST      0 - 40 IU/L 11   ALT      0 - 44 IU/L 13   WBC      3.4 - 10.8 x10E3/uL 9.8   RBC      4.14 - 5.80 x10E6/uL 4.57   HGB      13.0 - 17.7 g/dL 14.8   HCT      37.5 - 51.0 % 43.2   MCV      79 - 97 fL 95   MCH      26.6 - 33.0 pg 32.4   MCHC      31.5 - 35.7 g/dL 34.3   RDW      11.6 - 15.4 % 13.7   PLATELET      831 - 247 x10E3/uL 209   TSH      0.450 - 4.500 uIU/mL 1.610     Assessment/Plan:   1) DM > His A1C today was 8.5%, but he has been on prednisone for pain and swelling in his spine. His goal A1C is 8.0% or less. When we have tried more aggressive or higher dose of his insulin he has had hypoglycemia. I do not feel we need to make any changes at this time. For now pt to continue the Lantus 9 units daily, Glipizide ER 10mg daily and Janumet XR to 100/1000mg daliy. Pt to check his blood sugars twice per day. Pt to cut out the sodas and sweet tea. 2) HTN > His BP today was 113/66. He is on an ACEI for renal protection. Will not make any changes at this time. 3) HLD > His lipid panel was at goal in October 2021. Pt to continue the Atorvastatin 40mg daily. Pt voices understanding and agreement with the plan. Follow-up and Dispositions    Return in about 6 months (around 5/3/2023). RTC 6 months     Copy sent to:  Jah Marsh and Javier

## 2022-11-03 NOTE — PROGRESS NOTES
Chief Complaint   Patient presents with    Annual Wellness Visit    Hypertension    Cholesterol Problem     Check meds    1. \"Have you been to the ER, urgent care clinic since your last visit? Hospitalized since your last visit? \" Yes ER    2. \"Have you seen or consulted any other health care providers outside of the [address] [place] [address] since your last visit? \" Yes        3. For patients aged 39-70: Has the patient had a colonoscopy / FIT/ Cologuard? No      If the patient is female:    4. For patients aged 41-77: Has the patient had a mammogram within the past 2 years? NA - based on age or sex      11. For patients aged 21-65: Has the patient had a pap smear?  NA - based on age or sex

## 2022-11-03 NOTE — LETTER
11/3/2022    Patient: Emili Hughes   YOB: 1955   Date of Visit: 11/3/2022     Ron Kanner, MD  [address] [facility] [address] [place] [address]  Via In [facility] [address]    Dear Ron Kanner, MD,      Thank you for referring Mr. Magali Main to [address] [place] [address] for evaluation. My notes for this consultation are attached. If you have questions, please do not hesitate to call me. I look forward to following your patient along with you.       Sincerely,    Amrita Alonzo MD

## 2023-03-23 ENCOUNTER — VIRTUAL VISIT (OUTPATIENT)
Dept: ENDOCRINOLOGY | Age: 68
End: 2023-03-23

## 2023-03-23 DIAGNOSIS — E11.21 TYPE 2 DIABETES MELLITUS WITH DIABETIC NEPHROPATHY, WITH LONG-TERM CURRENT USE OF INSULIN (HCC): Primary | ICD-10-CM

## 2023-03-23 DIAGNOSIS — E78.2 MIXED HYPERLIPIDEMIA: ICD-10-CM

## 2023-03-23 DIAGNOSIS — I10 ESSENTIAL HYPERTENSION: ICD-10-CM

## 2023-03-23 DIAGNOSIS — Z79.4 TYPE 2 DIABETES MELLITUS WITH DIABETIC NEPHROPATHY, WITH LONG-TERM CURRENT USE OF INSULIN (HCC): Primary | ICD-10-CM

## 2023-03-23 RX ORDER — CLINDAMYCIN PHOSPHATE 10 MG/G
GEL TOPICAL 2 TIMES DAILY
Qty: 30 G | Refills: 1 | Status: SHIPPED | OUTPATIENT
Start: 2023-03-23

## 2023-03-23 NOTE — PROGRESS NOTES
Chief Complaint   Patient presents with    Diabetes     Pcp and pharmacy verified   [phone] (H) TELEPHONE ONLY   Records since his last visit reviewed. **THIS IS A VIRTUAL VISIT VIA A TELEPHONE ENCOUNTER. PATIENT AGREED TO HAVE THEIR CARE DELIVERED OVER THE PHONE IN PLACE OF THEIR REGULARLY SCHEDULED OFFICE VISIT**       Jaya Rizzo, was evaluated through a synchronous (real-time) audio-video encounter. The patient (or guardian if applicable) is aware that this is a billable service, which includes applicable co-pays. This Virtual Visit was conducted with patient's (and/or legal guardian's) consent. The visit was conducted pursuant to the emergency declaration under the [facility], [address] [place] [address] and the [employer] Act. Patient identification was verified, and a caregiver was present when appropriate. The patient was located in a state where the provider was licensed to provide care. History of Present Illness: Jaya Rizzo is a 75 y.o. male here for follow up of diabetes. \"I am doing well. I have been taking my medications and things are doing ok. \"  Pt notes that he was told the Janumet /1000mg daily was going to cost $600 and he would like to try something less expensive. Pt notes his back has been doing better. He has not been on any steroids or prednisone since our last visit. \"I had a tooth break and I have to see my dentist in April to get that taken care of.\"    Pt is not checking his BGs, but he has not been having issues of hypoglycemia. Pt notes he is taking Lantus 9 units daily, he is still taking the Glipizde ER 10mg in the AM only and Janumet /1000mg in the AM only. He denies issues of CP, SOB, palpitations, polyuria, he notes he has nocturia. Pt is eating 3 meals daily. His largest meal is Dinner.   He has breakfast between 8-9AM, today he had a waffle and diet soda. He will have lunch around 1PM, yesterday he had a turkey sandwich and water. He has supper around 6-7PM, last night he had fried rice water and coffee (splenda). He is followed by Dr. Stephanie Singer of cardiology. He saw Dr. Deb Kinsey in October 2022. \"He said every thing was looking good and he said to come in every year now. \"    He stays busy chopping wood and yard work and work around [employer]. He notes he has been walking around in his yard as well. Pt notes he has some weakness in his left leg secondary to his prior CVA. 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 Last eye exam was March 2022 and he had cataract surgery. \"The surgery actually helped my blind eye some as well. I can actually see my fingers. Current Outpatient Medications   Medication Sig    glipiZIDE SR (GLUCOTROL XL) 10 mg CR tablet Take 1 tablet by mouth once daily    insulin glargine (Lantus Solostar U-100 Insulin) 100 unit/mL (3 mL) inpn INJECT 9 UNITS SUBCUTANEOUSLY ONCE DAILY    gabapentin (NEURONTIN) 300 mg capsule Take 1 Capsule by mouth daily as needed for Pain. atorvastatin (LIPITOR) 40 mg tablet Take 1 tablet by mouth once daily    potassium chloride SA (MICRO-K) 10 mEq capsule Take 1 Capsule by mouth daily. lisinopriL (PRINIVIL, ZESTRIL) 20 mg tablet Take 1 Tablet by mouth daily. amLODIPine (NORVASC) 10 mg tablet Take 1 tablet by mouth once daily    nebivoloL (BYSTOLIC) 20 mg tablet Take 1 Tablet by mouth daily. chlorthalidone (HYGROTON) 25 mg tablet Take 1 Tablet by mouth daily. chlorhexidine (PERIDEX) 0.12 % solution RINSE FOR 30 SECONDS WITH 15ML OF SOLUTION THEN SPIT, 2-3 TIMES DAILY AS NEEDED    Janumet -1,000 mg TM24 Take 1 tablet by mouth once daily    Insulin Needles, Disposable, (Shadia Pen Needle) 32 gauge x 5/32\" ndle One shot daily    celecoxib (CELEBREX) 200 mg capsule Take 200 mg by mouth daily. psyllium husk 0.4 gram cap Take  by mouth daily. clotrimazole-betamethasone (LOTRISONE) 1-0.05 % lotion Apply to the effected area every morning    OTHER hemeopathic leg cramps    famotidine (PEPCID) 20 mg tablet Take 20 mg by mouth daily. cholecalciferol, vitamin D3, 50 mcg (2,000 unit) tab Take  by mouth daily. acetaminophen (TYLENOL) 325 mg tablet Take 1,000 mg by mouth every four (4) hours as needed for Pain. aspirin 81 mg chewable tablet Take 81 mg by mouth daily. No current facility-administered medications for this visit. No Known Allergies  Review of Systems:  - Eyes: no blurry vision or double vision  - Cardiovascular: no chest pain  - Respiratory: no shortness of breath  - Musculoskeletal: no myalgias  - Neurological: no numbness/tingling in extremities    Physical Examination:  There were no vitals taken for this visit. Data Reviewed:   None    Assessment/Plan:   1) DM > His goal A1C is 8.0% or less. When we have tried more aggressive or higher dose of his insulin he has had hypoglycemia. I do not feel we need to make any changes at this time. For now pt to continue the Lantus 9 units daily, Glipizide ER 10mg daily and I will change his Janumet XR to Jardiance/Metformin 10/1000mg daliy. Pt to check his blood sugars twice per day. Pt to cut out the sodas and sweet tea. I will order an A1C  today. 2) HTN > He is on an ACEI for renal protection. Will not make any changes at this time. 3) HLD > His lipid panel was at goal in October 2021. Pt to continue the Atorvastatin 40mg daily. I will order lipid panel and CMP. Pt voices understanding and agreement with the plan. We spent 22 minutes of total time together during this virtual visit with a telephone encounter. All questions were answered and a copy of the instructions were sent to her via [employer]/a letter. RTC 6 months     Copy sent to:  Jah Marsh and Javier

## 2023-03-23 NOTE — LETTER
3/23/2023 9:52 AM    Patient:  Matheus Bauer   YOB: 1955  Date of Visit: 3/23/2023      Dear Zoey Zavala MD  [address]  [address]  [address] [address]  Via [address] [facility] MD [place]  [address]  [address] [address]  Via Fax: [phone]: Thank you for referring Mr. Mirna Tolentino to me for evaluation/treatment. Below are the relevant portions of my assessment and plan of care. If you have questions, please do not hesitate to call me. I look forward to following Mr. Eloisa Capellan along with you. Chief Complaint   Patient presents with    Diabetes     Pcp and pharmacy verified   [phone] (H) TELEPHONE ONLY   Records since his last visit reviewed. History of Present Illness: Matheus Buaer is a 75 y.o. male here for follow up of diabetes. \"I have been having pain and weakness in my legs and they said it was because of a bulging disc. \"I had to go to the ED because I could not get out of bed. \"  He was given pain medication and steroids and was told to [facility] up with [facility]. His A1C was up to 8.5%    Pt is not checking his BGs, but he has not been having issues of hypoglycemia. Pt notes he is taking Lantus 9 units daily, he is still taking the Glipizde ER 10mg in the AM only and Janumet /1000mg in the AM only. He denies issues of CP, SOB, palpitations, polyuria, he notes he has nocturia. \"I had a tooth broke a couple weeks ago and I had to go get it pulled out. I had the partial palate made, but I have not been using it, it did not fit right. I have to go back next week and they may need to pull some more teeth. \"    Pt is eating 3 meals daily. His largest meal is Dinner. He has breakfast between 8-9AM, today he had a pop-tart and diet soda. He will have lunch around 1PM, yesterday he had two rolls with beef and water.    He has supper around 6-7PM, last night he had fried rice and grape soda and a doughnut. He is followed by Dr. Ivon Griffith of cardiology. He saw Dr. Ottoniel Gonzalez in October 2022. \"He said every thing was looking good and he said to come in every year now. \"    He stays busy chopping wood and yard work and work around [employer]. He notes he has been walking around in his yard as well. Pt notes he has some weakness in his left leg secondary to his prior CVA. 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 Last eye exam was March 2022 and he had cataract surgery. \"The surgery actually helped my blind eye some as well. I can actually see my fingers. Current Outpatient Medications   Medication Sig    glipiZIDE SR (GLUCOTROL XL) 10 mg CR tablet Take 1 tablet by mouth once daily    insulin glargine (Lantus Solostar U-100 Insulin) 100 unit/mL (3 mL) inpn INJECT 9 UNITS SUBCUTANEOUSLY ONCE DAILY    gabapentin (NEURONTIN) 300 mg capsule Take 1 Capsule by mouth daily as needed for Pain.  atorvastatin (LIPITOR) 40 mg tablet Take 1 tablet by mouth once daily    potassium chloride SA (MICRO-K) 10 mEq capsule Take 1 Capsule by mouth daily.  lisinopriL (PRINIVIL, ZESTRIL) 20 mg tablet Take 1 Tablet by mouth daily.  amLODIPine (NORVASC) 10 mg tablet Take 1 tablet by mouth once daily    nebivoloL (BYSTOLIC) 20 mg tablet Take 1 Tablet by mouth daily.  chlorthalidone (HYGROTON) 25 mg tablet Take 1 Tablet by mouth daily.  chlorhexidine (PERIDEX) 0.12 % solution RINSE FOR 30 SECONDS WITH 15ML OF SOLUTION THEN SPIT, 2-3 TIMES DAILY AS NEEDED    Janumet -1,000 mg TM24 Take 1 tablet by mouth once daily    Insulin Needles, Disposable, (Shadia Pen Needle) 32 gauge x 5/32\" ndle One shot daily    celecoxib (CELEBREX) 200 mg capsule Take 200 mg by mouth daily.  psyllium husk 0.4 gram cap Take  by mouth daily.     clotrimazole-betamethasone (LOTRISONE) 1-0.05 % lotion Apply to the effected area every morning    OTHER hemeopathic leg cramps    famotidine (PEPCID) 20 mg tablet Take 20 mg by mouth daily.  cholecalciferol, vitamin D3, 50 mcg (2,000 unit) tab Take  by mouth daily.  acetaminophen (TYLENOL) 325 mg tablet Take 1,000 mg by mouth every four (4) hours as needed for Pain.  aspirin 81 mg chewable tablet Take 81 mg by mouth daily. No current facility-administered medications for this visit. No Known Allergies  Review of Systems:  - Eyes: no blurry vision or double vision  - Cardiovascular: no chest pain  - Respiratory: no shortness of breath  - Musculoskeletal: no myalgias  - Neurological: no numbness/tingling in extremities    Physical Examination:   There were no vitals taken for this visit. - General: pleasant, no distress, good eye contact   - Neck: no carotid bruits  - Cardiovascular: regular, normal rate, nl s1 and s2, no m/r/g, 2+ DP pulses   - Respiratory: clear bilaterally  - Integumentary: no edema, no foot ulcers  - Psychiatric: normal mood and affect    Diabetic foot exam:     Left Foot:   Visual Exam: callous - present   Pulse DP: 2+ (normal)   Filament test: normal sensation    Vibratory sensation: normal      Right Foot:   Visual Exam: callous - present   Pulse DP: 2+ (normal)   Filament test: normal sensation    Vibratory sensation: normal        Data Reviewed:   His A1C today was 8.5%. Component      Latest Ref Rng & Units 8/31/2022   Glucose      65 - 99 mg/dL 164 (H)   BUN      8 - 27 mg/dL 15   Creatinine      0.76 - 1.27 mg/dL 0.94   eGFR      >59 mL/min/1.73 88   BUN/Creatinine ratio      10 - 24 16   Sodium      134 - 144 mmol/L 141   Potassium      3.5 - 5.2 mmol/L 4.6   Chloride      96 - 106 mmol/L 103   CO2      20 - 29 mmol/L 24   Calcium      8.6 - 10.2 mg/dL 9.5   Protein, total      6.0 - 8.5 g/dL 6.8   Albumin      3.8 - 4.8 g/dL 4.4   GLOBULIN, TOTAL      1.5 - 4.5 g/dL 2.4   A-G Ratio      1.2 - 2.2 1.8   Bilirubin, total      0.0 - 1.2 mg/dL 0.6   Alk.  phosphatase 44 - 121 IU/L 92   AST      0 - 40 IU/L 11   ALT      0 - 44 IU/L 13   WBC      3.4 - 10.8 x10E3/uL 9.8   RBC      4.14 - 5.80 x10E6/uL 4.57   HGB      13.0 - 17.7 g/dL 14.8   HCT      37.5 - 51.0 % 43.2   MCV      79 - 97 fL 95   MCH      26.6 - 33.0 pg 32.4   MCHC      31.5 - 35.7 g/dL 34.3   RDW      11.6 - 15.4 % 13.7   PLATELET      455 - 324 x10E3/uL 209   TSH      0.450 - 4.500 uIU/mL 1.610     Assessment/Plan:   1) DM > His A1C today was 8.5%, but he has been on prednisone for pain and swelling in his spine. His goal A1C is 8.0% or less. When we have tried more aggressive or higher dose of his insulin he has had hypoglycemia. I do not feel we need to make any changes at this time. For now pt to continue the Lantus 9 units daily, Glipizide ER 10mg daily and Janumet XR to 100/1000mg daliy. Pt to check his blood sugars twice per day. Pt to cut out the sodas and sweet tea. 2) HTN > His BP today was 113/66. He is on an ACEI for renal protection. Will not make any changes at this time. 3) HLD > His lipid panel was at goal in October 2021. Pt to continue the Atorvastatin 40mg daily. Pt voices understanding and agreement with the plan. RTC 6 months     Copy sent to:  Jah Marsh and Javier      Sincerely,      Klaus Dawson MD

## 2023-04-22 DIAGNOSIS — Z79.4 TYPE 2 DIABETES MELLITUS WITH DIABETIC NEPHROPATHY, WITH LONG-TERM CURRENT USE OF INSULIN (HCC): Primary | ICD-10-CM

## 2023-04-22 DIAGNOSIS — E11.21 TYPE 2 DIABETES MELLITUS WITH DIABETIC NEPHROPATHY, WITH LONG-TERM CURRENT USE OF INSULIN (HCC): Primary | ICD-10-CM

## 2023-04-27 RX ORDER — PIOGLITAZONEHYDROCHLORIDE 15 MG/1
15 TABLET ORAL DAILY
Qty: 30 TABLET | Refills: 5 | Status: SHIPPED | OUTPATIENT
Start: 2023-04-27

## 2023-04-28 ENCOUNTER — TELEPHONE (OUTPATIENT)
Dept: ENDOCRINOLOGY | Age: 68
End: 2023-04-28

## 2023-04-28 RX ORDER — SITAGLIPTIN AND METFORMIN HYDROCHLORIDE 100; 1000 MG/1; MG/1
1 TABLET, FILM COATED, EXTENDED RELEASE ORAL DAILY
Qty: 90 TABLET | Refills: 3 | Status: SHIPPED | OUTPATIENT
Start: 2023-04-28

## 2023-04-28 NOTE — TELEPHONE ENCOUNTER
Patient called stating that he is urinating very frequently since taking Synjardy. His feet are more numb. Hard to bend toes. Patient is returning call about results. He wants to speak with . He can be reached at [phone].

## 2023-04-28 NOTE — TELEPHONE ENCOUNTER
Spoke with pt regarding his issues of polyuria. Since he is not able to tolerate the Jardiance/Metformin, I will restart toe Janumet XR and we discussed starting Actos 15mg daily. Pt voices understanding and agreement with the plan.

## 2023-05-01 DIAGNOSIS — E11.21 TYPE 2 DIABETES MELLITUS WITH DIABETIC NEPHROPATHY, WITH LONG-TERM CURRENT USE OF INSULIN (HCC): ICD-10-CM

## 2023-05-01 DIAGNOSIS — Z79.4 TYPE 2 DIABETES MELLITUS WITH DIABETIC NEPHROPATHY, WITH LONG-TERM CURRENT USE OF INSULIN (HCC): ICD-10-CM

## 2023-06-20 RX ORDER — POTASSIUM CHLORIDE 750 MG/1
10 CAPSULE, EXTENDED RELEASE ORAL DAILY
Qty: 30 CAPSULE | Refills: 0 | OUTPATIENT
Start: 2023-06-20

## 2023-06-20 RX ORDER — LISINOPRIL 20 MG/1
20 TABLET ORAL DAILY
Qty: 30 TABLET | Refills: 0 | OUTPATIENT
Start: 2023-06-20

## 2023-06-20 RX ORDER — NEBIVOLOL 20 MG/1
20 TABLET ORAL DAILY
Qty: 30 TABLET | Refills: 0 | OUTPATIENT
Start: 2023-06-20

## 2023-06-20 NOTE — TELEPHONE ENCOUNTER
Pt have not f/up in appropirate timeline. Almost 8 months now. Needs apt before refill.      Jerel Birmingham MD  09/18/23

## 2023-06-20 NOTE — TELEPHONE ENCOUNTER
Last appointment: 11/3/22  Next appointment: Breann Unger to follow-up 5/3/23  Previous refill encounter(s): 11/3/22 90 d/s with 1 refill    Requested Prescriptions     Pending Prescriptions Disp Refills    lisinopril (PRINIVIL;ZESTRIL) 20 MG tablet [Pharmacy Med Name: Lisinopril 20 MG Oral Tablet] 30 tablet 0     Sig: Take 1 tablet by mouth daily Patient needs an appointment for further refills    nebivolol (BYSTOLIC) 20 MG TABS tablet [Pharmacy Med Name: Nebivolol HCl 20 MG Oral Tablet] 30 tablet 0     Sig: Take 1 tablet by mouth daily Patient needs an appointment for further refills    potassium chloride (MICRO-K) 10 MEQ extended release capsule [Pharmacy Med Name: Potassium Chloride ER 10 MEQ Oral Capsule Extended Release] 30 capsule 0     Sig: Take 1 capsule by mouth daily Patient needs an appointment for further refills         For Pharmacy Admin Tracking Only    Program: Medication Refill  CPA in place:    Recommendation Provided To:    Intervention Detail: New Rx: 3, reason: Patient Preference  Intervention Accepted By:   Jesse Larios Closed?:    Time Spent (min): 5

## 2023-06-26 ENCOUNTER — TELEPHONE (OUTPATIENT)
Age: 68
End: 2023-06-26

## 2023-06-30 ENCOUNTER — TELEMEDICINE (OUTPATIENT)
Age: 68
End: 2023-06-30

## 2023-06-30 DIAGNOSIS — Z79.899 ENCOUNTER FOR LONG-TERM (CURRENT) USE OF MEDICATIONS: Primary | ICD-10-CM

## 2023-06-30 DIAGNOSIS — E78.2 MIXED HYPERLIPIDEMIA: ICD-10-CM

## 2023-06-30 DIAGNOSIS — I10 PRIMARY HYPERTENSION: ICD-10-CM

## 2023-06-30 RX ORDER — LISINOPRIL 20 MG/1
20 TABLET ORAL DAILY
Qty: 90 TABLET | Refills: 1 | Status: SHIPPED | OUTPATIENT
Start: 2023-06-30

## 2023-06-30 RX ORDER — CHLORTHALIDONE 25 MG/1
25 TABLET ORAL DAILY
Qty: 90 TABLET | Refills: 1 | Status: SHIPPED | OUTPATIENT
Start: 2023-06-30

## 2023-06-30 RX ORDER — ATORVASTATIN CALCIUM 40 MG/1
40 TABLET, FILM COATED ORAL DAILY
Qty: 90 TABLET | Refills: 1 | Status: SHIPPED | OUTPATIENT
Start: 2023-06-30

## 2023-06-30 RX ORDER — SITAGLIPTIN AND METFORMIN HYDROCHLORIDE 1000; 100 MG/1; MG/1
1 TABLET, FILM COATED, EXTENDED RELEASE ORAL DAILY
COMMUNITY
Start: 2023-05-01

## 2023-06-30 RX ORDER — PIOGLITAZONEHYDROCHLORIDE 15 MG/1
15 TABLET ORAL DAILY
COMMUNITY
Start: 2023-04-27

## 2023-06-30 RX ORDER — AMLODIPINE BESYLATE 10 MG/1
10 TABLET ORAL DAILY
Qty: 90 TABLET | Refills: 1 | Status: SHIPPED | OUTPATIENT
Start: 2023-06-30

## 2023-06-30 RX ORDER — NEBIVOLOL 20 MG/1
20 TABLET ORAL DAILY
Qty: 90 TABLET | Refills: 1 | Status: SHIPPED | OUTPATIENT
Start: 2023-06-30

## 2023-06-30 SDOH — ECONOMIC STABILITY: HOUSING INSECURITY
IN THE LAST 12 MONTHS, WAS THERE A TIME WHEN YOU DID NOT HAVE A STEADY PLACE TO SLEEP OR SLEPT IN A SHELTER (INCLUDING NOW)?: NO

## 2023-06-30 SDOH — ECONOMIC STABILITY: FOOD INSECURITY: WITHIN THE PAST 12 MONTHS, YOU WORRIED THAT YOUR FOOD WOULD RUN OUT BEFORE YOU GOT MONEY TO BUY MORE.: NEVER TRUE

## 2023-06-30 SDOH — ECONOMIC STABILITY: INCOME INSECURITY: HOW HARD IS IT FOR YOU TO PAY FOR THE VERY BASICS LIKE FOOD, HOUSING, MEDICAL CARE, AND HEATING?: NOT HARD AT ALL

## 2023-06-30 SDOH — ECONOMIC STABILITY: FOOD INSECURITY: WITHIN THE PAST 12 MONTHS, THE FOOD YOU BOUGHT JUST DIDN'T LAST AND YOU DIDN'T HAVE MONEY TO GET MORE.: NEVER TRUE

## 2023-06-30 ASSESSMENT — ANXIETY QUESTIONNAIRES
3. WORRYING TOO MUCH ABOUT DIFFERENT THINGS: 0
2. NOT BEING ABLE TO STOP OR CONTROL WORRYING: 0
6. BECOMING EASILY ANNOYED OR IRRITABLE: 0
5. BEING SO RESTLESS THAT IT IS HARD TO SIT STILL: 0
GAD7 TOTAL SCORE: 0
4. TROUBLE RELAXING: 0
IF YOU CHECKED OFF ANY PROBLEMS ON THIS QUESTIONNAIRE, HOW DIFFICULT HAVE THESE PROBLEMS MADE IT FOR YOU TO DO YOUR WORK, TAKE CARE OF THINGS AT HOME, OR GET ALONG WITH OTHER PEOPLE: NOT DIFFICULT AT ALL
7. FEELING AFRAID AS IF SOMETHING AWFUL MIGHT HAPPEN: 0
1. FEELING NERVOUS, ANXIOUS, OR ON EDGE: 0

## 2023-06-30 ASSESSMENT — PATIENT HEALTH QUESTIONNAIRE - PHQ9
2. FEELING DOWN, DEPRESSED OR HOPELESS: 0
SUM OF ALL RESPONSES TO PHQ QUESTIONS 1-9: 0
SUM OF ALL RESPONSES TO PHQ QUESTIONS 1-9: 0
1. LITTLE INTEREST OR PLEASURE IN DOING THINGS: 0
SUM OF ALL RESPONSES TO PHQ QUESTIONS 1-9: 0
SUM OF ALL RESPONSES TO PHQ QUESTIONS 1-9: 0
SUM OF ALL RESPONSES TO PHQ9 QUESTIONS 1 & 2: 0

## 2023-11-30 ENCOUNTER — OFFICE VISIT (OUTPATIENT)
Age: 68
End: 2023-11-30
Payer: MEDICARE

## 2023-11-30 VITALS
SYSTOLIC BLOOD PRESSURE: 124 MMHG | OXYGEN SATURATION: 96 % | DIASTOLIC BLOOD PRESSURE: 76 MMHG | HEIGHT: 69 IN | TEMPERATURE: 96.8 F | RESPIRATION RATE: 16 BRPM | HEART RATE: 61 BPM | WEIGHT: 185.2 LBS | BODY MASS INDEX: 27.43 KG/M2

## 2023-11-30 DIAGNOSIS — Z91.199 NONCOMPLIANCE: ICD-10-CM

## 2023-11-30 DIAGNOSIS — Z00.00 MEDICARE ANNUAL WELLNESS VISIT, SUBSEQUENT: Primary | ICD-10-CM

## 2023-11-30 DIAGNOSIS — I50.22 CHRONIC SYSTOLIC HEART FAILURE (HCC): ICD-10-CM

## 2023-11-30 DIAGNOSIS — Z79.4 TYPE 2 DIABETES MELLITUS WITH COMPLICATION, WITH LONG-TERM CURRENT USE OF INSULIN (HCC): ICD-10-CM

## 2023-11-30 DIAGNOSIS — I10 PRIMARY HYPERTENSION: ICD-10-CM

## 2023-11-30 DIAGNOSIS — Z79.899 ENCOUNTER FOR LONG-TERM (CURRENT) USE OF MEDICATIONS: ICD-10-CM

## 2023-11-30 DIAGNOSIS — E78.2 MIXED HYPERLIPIDEMIA: ICD-10-CM

## 2023-11-30 DIAGNOSIS — K21.9 GASTROESOPHAGEAL REFLUX DISEASE WITHOUT ESOPHAGITIS: ICD-10-CM

## 2023-11-30 DIAGNOSIS — Z95.1 S/P CABG X 4: ICD-10-CM

## 2023-11-30 DIAGNOSIS — Z86.73 HISTORY OF ISCHEMIC STROKE: ICD-10-CM

## 2023-11-30 DIAGNOSIS — E11.8 TYPE 2 DIABETES MELLITUS WITH COMPLICATION, WITH LONG-TERM CURRENT USE OF INSULIN (HCC): ICD-10-CM

## 2023-11-30 DIAGNOSIS — Z72.0 TOBACCO ABUSE DISORDER: ICD-10-CM

## 2023-11-30 PROCEDURE — G0439 PPPS, SUBSEQ VISIT: HCPCS | Performed by: FAMILY MEDICINE

## 2023-11-30 PROCEDURE — 99214 OFFICE O/P EST MOD 30 MIN: CPT | Performed by: FAMILY MEDICINE

## 2023-11-30 PROCEDURE — 3046F HEMOGLOBIN A1C LEVEL >9.0%: CPT | Performed by: FAMILY MEDICINE

## 2023-11-30 PROCEDURE — 3017F COLORECTAL CA SCREEN DOC REV: CPT | Performed by: FAMILY MEDICINE

## 2023-11-30 PROCEDURE — G8419 CALC BMI OUT NRM PARAM NOF/U: HCPCS | Performed by: FAMILY MEDICINE

## 2023-11-30 PROCEDURE — G8484 FLU IMMUNIZE NO ADMIN: HCPCS | Performed by: FAMILY MEDICINE

## 2023-11-30 PROCEDURE — 2022F DILAT RTA XM EVC RTNOPTHY: CPT | Performed by: FAMILY MEDICINE

## 2023-11-30 PROCEDURE — 4004F PT TOBACCO SCREEN RCVD TLK: CPT | Performed by: FAMILY MEDICINE

## 2023-11-30 PROCEDURE — 1123F ACP DISCUSS/DSCN MKR DOCD: CPT | Performed by: FAMILY MEDICINE

## 2023-11-30 PROCEDURE — G8427 DOCREV CUR MEDS BY ELIG CLIN: HCPCS | Performed by: FAMILY MEDICINE

## 2023-11-30 PROCEDURE — 3074F SYST BP LT 130 MM HG: CPT | Performed by: FAMILY MEDICINE

## 2023-11-30 PROCEDURE — 3078F DIAST BP <80 MM HG: CPT | Performed by: FAMILY MEDICINE

## 2023-11-30 RX ORDER — ASPIRIN 81 MG/1
81 TABLET, CHEWABLE ORAL DAILY
Qty: 90 TABLET | Refills: 1 | Status: SHIPPED | OUTPATIENT
Start: 2023-11-30

## 2023-11-30 RX ORDER — NEBIVOLOL 20 MG/1
20 TABLET ORAL DAILY
Qty: 90 TABLET | Refills: 1 | Status: SHIPPED | OUTPATIENT
Start: 2023-11-30

## 2023-11-30 RX ORDER — AMLODIPINE BESYLATE 10 MG/1
10 TABLET ORAL DAILY
Qty: 90 TABLET | Refills: 1 | Status: SHIPPED | OUTPATIENT
Start: 2023-11-30

## 2023-11-30 RX ORDER — LISINOPRIL 20 MG/1
20 TABLET ORAL DAILY
Qty: 90 TABLET | Refills: 1 | Status: SHIPPED | OUTPATIENT
Start: 2023-11-30

## 2023-11-30 RX ORDER — CHLORTHALIDONE 25 MG/1
25 TABLET ORAL DAILY
Qty: 90 TABLET | Refills: 1 | Status: SHIPPED | OUTPATIENT
Start: 2023-11-30

## 2023-11-30 RX ORDER — ATORVASTATIN CALCIUM 40 MG/1
40 TABLET, FILM COATED ORAL DAILY
Qty: 90 TABLET | Refills: 1 | Status: SHIPPED | OUTPATIENT
Start: 2023-11-30

## 2023-11-30 ASSESSMENT — ANXIETY QUESTIONNAIRES
7. FEELING AFRAID AS IF SOMETHING AWFUL MIGHT HAPPEN: 0
IF YOU CHECKED OFF ANY PROBLEMS ON THIS QUESTIONNAIRE, HOW DIFFICULT HAVE THESE PROBLEMS MADE IT FOR YOU TO DO YOUR WORK, TAKE CARE OF THINGS AT HOME, OR GET ALONG WITH OTHER PEOPLE: NOT DIFFICULT AT ALL
1. FEELING NERVOUS, ANXIOUS, OR ON EDGE: 0
4. TROUBLE RELAXING: 0
3. WORRYING TOO MUCH ABOUT DIFFERENT THINGS: 0
GAD7 TOTAL SCORE: 0
6. BECOMING EASILY ANNOYED OR IRRITABLE: 0
2. NOT BEING ABLE TO STOP OR CONTROL WORRYING: 0
5. BEING SO RESTLESS THAT IT IS HARD TO SIT STILL: 0

## 2023-11-30 ASSESSMENT — PATIENT HEALTH QUESTIONNAIRE - PHQ9
SUM OF ALL RESPONSES TO PHQ QUESTIONS 1-9: 0
SUM OF ALL RESPONSES TO PHQ QUESTIONS 1-9: 0
SUM OF ALL RESPONSES TO PHQ9 QUESTIONS 1 & 2: 0
2. FEELING DOWN, DEPRESSED OR HOPELESS: 0
SUM OF ALL RESPONSES TO PHQ QUESTIONS 1-9: 0
SUM OF ALL RESPONSES TO PHQ QUESTIONS 1-9: 0
1. LITTLE INTEREST OR PLEASURE IN DOING THINGS: 0

## 2023-11-30 ASSESSMENT — LIFESTYLE VARIABLES
HOW MANY STANDARD DRINKS CONTAINING ALCOHOL DO YOU HAVE ON A TYPICAL DAY: PATIENT DOES NOT DRINK
HOW OFTEN DO YOU HAVE A DRINK CONTAINING ALCOHOL: NEVER

## 2023-11-30 NOTE — PROGRESS NOTES
Chief Complaint   Patient presents with    Medicare AWV       1. Have you been to the ER, urgent care clinic since your last visit? Hospitalized since your last visit? No    2. Have you seen or consulted any other health care providers outside of the [address] [place] [address] since your last visit? Include any pap smears or colon screening.  No

## 2024-01-30 RX ORDER — PIOGLITAZONEHYDROCHLORIDE 15 MG/1
15 TABLET ORAL DAILY
Qty: 90 TABLET | Refills: 0 | OUTPATIENT
Start: 2024-01-30

## 2024-01-30 RX ORDER — GLIPIZIDE 10 MG/1
10 TABLET, FILM COATED, EXTENDED RELEASE ORAL DAILY
Qty: 90 TABLET | Refills: 0 | OUTPATIENT
Start: 2024-01-30

## 2025-01-26 NOTE — PROGRESS NOTES
Nutrition Note    Consulted for CHF diet education.  [facility] RN provided education yesterday.  Pt with no additional questions.  Will follow need/request for additional education.    Electronically signed by Samra Wood RD, LD on 1/26/25 at 11:45 AM EST    Contact: [phone]   Medicare Annual Wellness Visit    Alicia Ross is here for Medicare AWV    Assessment & Plan   Medicare annual wellness visit, subsequent  Primary hypertension  -     amLODIPine (NORVASC) 10 MG tablet; Take 1 tablet by mouth daily, Disp-90 tablet, R-1Normal  -     chlorthalidone (HYGROTON) 25 MG tablet; Take 1 tablet by mouth daily, Disp-90 tablet, R-1Normal  -     lisinopril (PRINIVIL;ZESTRIL) 20 MG tablet; Take 1 tablet by mouth daily, Disp-90 tablet, R-1Normal  -     nebivolol (BYSTOLIC) 20 MG TABS tablet; Take 1 tablet by mouth daily, Disp-90 tablet, R-1Normal  -     CBC; Future  -     Comprehensive Metabolic Panel; Future  -     TSH; Future  Mixed hyperlipidemia  -     atorvastatin (LIPITOR) 40 MG tablet; Take 1 tablet by mouth daily, Disp-90 tablet, R-1Normal  -     Comprehensive Metabolic Panel; Future  -     TSH; Future  -     Lipid Panel; Future  Gastroesophageal reflux disease without esophagitis  Noncompliance  Tobacco abuse disorder  S/P CABG x 4  -     aspirin 81 MG chewable tablet; Take 1 tablet by mouth daily, Disp-90 tablet, R-1Normal  -     atorvastatin (LIPITOR) 40 MG tablet; Take 1 tablet by mouth daily, Disp-90 tablet, R-1Normal  -     nebivolol (BYSTOLIC) 20 MG TABS tablet; Take 1 tablet by mouth daily, Disp-90 tablet, [id number]  Chronic systolic heart failure (HCC)  -     aspirin 81 MG chewable tablet; Take 1 tablet by mouth daily, Disp-90 tablet, R-1Normal  -     CBC; Future  -     Comprehensive Metabolic Panel; Future  -     TSH; Future  History of ischemic stroke  -     aspirin 81 MG chewable tablet; Take 1 tablet by mouth daily, Disp-90 tablet, R-1Normal  -     atorvastatin (LIPITOR) 40 MG tablet; Take 1 tablet by mouth daily, Disp-90 tablet, R-1Normal  Type 2 diabetes mellitus with complication, with long-term current use of insulin (HCC)  -     CBC; Future  -     Comprehensive Metabolic Panel; Future  -     TSH; Future  -     Lipid Panel;  Future  -     Hemoglobin A1C;